# Patient Record
Sex: MALE | Race: BLACK OR AFRICAN AMERICAN
[De-identification: names, ages, dates, MRNs, and addresses within clinical notes are randomized per-mention and may not be internally consistent; named-entity substitution may affect disease eponyms.]

---

## 2017-05-22 ENCOUNTER — HOSPITAL ENCOUNTER (INPATIENT)
Dept: HOSPITAL 83 - ED | Age: 52
LOS: 1 days | Discharge: HOME | DRG: 637 | End: 2017-05-23
Attending: INTERNAL MEDICINE | Admitting: INTERNAL MEDICINE
Payer: COMMERCIAL

## 2017-05-22 VITALS — DIASTOLIC BLOOD PRESSURE: 71 MMHG | SYSTOLIC BLOOD PRESSURE: 119 MMHG

## 2017-05-22 VITALS — HEIGHT: 72 IN | BODY MASS INDEX: 28.9 KG/M2 | WEIGHT: 213.37 LBS

## 2017-05-22 VITALS — DIASTOLIC BLOOD PRESSURE: 56 MMHG | SYSTOLIC BLOOD PRESSURE: 121 MMHG

## 2017-05-22 VITALS — DIASTOLIC BLOOD PRESSURE: 85 MMHG

## 2017-05-22 VITALS — DIASTOLIC BLOOD PRESSURE: 107 MMHG

## 2017-05-22 VITALS — DIASTOLIC BLOOD PRESSURE: 85 MMHG | SYSTOLIC BLOOD PRESSURE: 140 MMHG

## 2017-05-22 VITALS — DIASTOLIC BLOOD PRESSURE: 103 MMHG

## 2017-05-22 VITALS — DIASTOLIC BLOOD PRESSURE: 57 MMHG | SYSTOLIC BLOOD PRESSURE: 108 MMHG

## 2017-05-22 VITALS — DIASTOLIC BLOOD PRESSURE: 93 MMHG

## 2017-05-22 DIAGNOSIS — M54.16: ICD-10-CM

## 2017-05-22 DIAGNOSIS — N17.0: ICD-10-CM

## 2017-05-22 DIAGNOSIS — M54.5: ICD-10-CM

## 2017-05-22 DIAGNOSIS — E66.3: ICD-10-CM

## 2017-05-22 DIAGNOSIS — Z82.49: ICD-10-CM

## 2017-05-22 DIAGNOSIS — E55.9: ICD-10-CM

## 2017-05-22 DIAGNOSIS — E11.00: Primary | ICD-10-CM

## 2017-05-22 LAB
25(OH)D3 SERPL-MCNC: 7.5 NG/ML (ref 30–100)
ALBUMIN SERPL-MCNC: 3.6 GM/DL (ref 3.1–4.5)
ALBUMIN SERPL-MCNC: 4.1 GM/DL (ref 3.1–4.5)
ALBUMIN SERPL-MCNC: NEGATIVE G/DL
ALP SERPL-CCNC: 135 U/L (ref 45–117)
ALP SERPL-CCNC: 191 U/L (ref 45–117)
ALT SERPL W P-5'-P-CCNC: 49 U/L (ref 12–78)
ALT SERPL W P-5'-P-CCNC: 54 U/L (ref 12–78)
AMPHETAMINES UR QL SCN: < 1000
APPEARANCE UR: CLEAR
AST SERPL-CCNC: 24 IU/L (ref 3–35)
AST SERPL-CCNC: 30 IU/L (ref 3–35)
BARBITURATES UR QL SCN: < 200
BASOPHILS # BLD AUTO: 0 10*3/UL (ref 0–0.1)
BASOPHILS # BLD AUTO: 0 10*3/UL (ref 0–0.1)
BASOPHILS NFR BLD AUTO: 0.4 % (ref 0–1)
BASOPHILS NFR BLD AUTO: 0.5 % (ref 0–1)
BILIRUB UR QL STRIP: NEGATIVE
BUN SERPL-MCNC: 15 MG/DL (ref 7–24)
BUN SERPL-MCNC: 18 MG/DL (ref 7–24)
BZE UR QL SCN: < 300
CHLORIDE SERPL-SCNC: 102 MMOL/L (ref 98–107)
CHLORIDE SERPL-SCNC: 92 MMOL/L (ref 98–107)
CHOLEST SERPL-MCNC: 167 MG/DL (ref ?–200)
CO2 SERPL-SCNC: 23 MMOL/L (ref 21–32)
CO2 SERPL-SCNC: 25 MMOL/L (ref 21–32)
COLOR UR: YELLOW
EOSINOPHIL # BLD AUTO: 0.1 10*3/UL (ref 0–0.4)
EOSINOPHIL # BLD AUTO: 0.1 10*3/UL (ref 0–0.4)
EOSINOPHIL # BLD AUTO: 1.4 % (ref 1–4)
EOSINOPHIL # BLD AUTO: 2 % (ref 1–4)
ERYTHROCYTE [DISTWIDTH] IN BLOOD BY AUTOMATED COUNT: 12.4 % (ref 0–14.5)
ERYTHROCYTE [DISTWIDTH] IN BLOOD BY AUTOMATED COUNT: 12.5 % (ref 0–14.5)
EST. AVERAGE GLUCOSE BLD GHB EST-MCNC: 243 MG/DL
FOLATE SERPL-MCNC: 18.64 NG/ML (ref 5.38–?)
GLUCOSE SERPL-MCNC: 362 MG/DL (ref 65–99)
GLUCOSE SERPL-MCNC: 724 MG/DL (ref 65–99)
GLUCOSE UR QL: (no result)
HCT VFR BLD AUTO: 41.2 % (ref 42–52)
HCT VFR BLD AUTO: 46.8 % (ref 42–52)
HDLC SERPL-MCNC: 29 MG/DL (ref 40–60)
HGB BLD-MCNC: 14.7 G/DL (ref 14–18)
HGB BLD-MCNC: 16.5 G/DL (ref 14–18)
HGB UR QL STRIP: NEGATIVE
IG #: 0 10*3/UL (ref 0–0.1)
IG #: 0 10*3/UL (ref 0–0.1)
KETONES UR QL STRIP: NEGATIVE
LEUKOCYTE ESTERASE UR QL STRIP: NEGATIVE
LYMPHOCYTES # BLD AUTO: 1.9 10*3/UL (ref 1.3–4.4)
LYMPHOCYTES # BLD AUTO: 2.4 10*3/UL (ref 1.3–4.4)
LYMPHOCYTES NFR BLD AUTO: 38.4 % (ref 27–41)
LYMPHOCYTES NFR BLD AUTO: 44.1 % (ref 27–41)
MAGNESIUM SERPL-MCNC: 1.9 MG/DL (ref 1.5–2.1)
MAGNESIUM SERPL-MCNC: 2 MG/DL (ref 1.5–2.1)
MCH RBC QN AUTO: 29.8 PG (ref 27–31)
MCH RBC QN AUTO: 30.3 PG (ref 27–31)
MCHC RBC AUTO-ENTMCNC: 35.3 G/DL (ref 33–37)
MCHC RBC AUTO-ENTMCNC: 35.7 G/DL (ref 33–37)
MCV RBC AUTO: 84.5 FL (ref 80–94)
MCV RBC AUTO: 84.9 FL (ref 80–94)
MONOCYTES # BLD AUTO: 0.4 10*3/UL (ref 0.1–1)
MONOCYTES # BLD AUTO: 0.4 10*3/UL (ref 0.1–1)
MONOCYTES NFR BLD MANUAL: 8 % (ref 3–9)
MONOCYTES NFR BLD MANUAL: 8.8 % (ref 3–9)
NEUT #: 2.5 10*3/UL (ref 2.3–7.9)
NEUT #: 2.5 10*3/UL (ref 2.3–7.9)
NEUT %: 45.8 % (ref 47–73)
NEUT %: 50.2 % (ref 47–73)
NITRITE UR QL STRIP: NEGATIVE
NRBC BLD QL AUTO: 0 % (ref 0–0)
NRBC BLD QL AUTO: 0 10*3/UL (ref 0–0)
PH UR STRIP: 5 [PH] (ref 5–9)
PHOSPHATE SERPL-MCNC: 3.5 MG/DL (ref 2.5–4.9)
PLATELET # BLD AUTO: 161 10*3/UL (ref 130–400)
PLATELET # BLD AUTO: 197 10*3/UL (ref 130–400)
PMV BLD AUTO: 11.5 FL (ref 9.6–12.3)
PMV BLD AUTO: 11.6 FL (ref 9.6–12.3)
POTASSIUM SERPL-SCNC: 3.9 MMOL/L (ref 3.5–5.1)
POTASSIUM SERPL-SCNC: 4.1 MMOL/L (ref 3.5–5.1)
PROT SERPL-MCNC: 6.9 GM/DL (ref 6.4–8.2)
PROT SERPL-MCNC: 8.2 GM/DL (ref 6.4–8.2)
RBC # BLD AUTO: 4.85 10*6/UL (ref 4.5–5.9)
RBC # BLD AUTO: 5.54 10*6/UL (ref 4.5–5.9)
RBC #/AREA URNS HPF: (no result) RBC/HPF (ref 0–2)
SODIUM SERPL-SCNC: 130 MMOL/L (ref 136–145)
SODIUM SERPL-SCNC: 136 MMOL/L (ref 136–145)
SP GR UR: <= 1.005 (ref 1–1.03)
T4 FREE SERPL-MCNC: 1.13 NG/DL (ref 0.76–1.46)
TRIGL SERPL-MCNC: 765 MG/DL (ref ?–150)
TROPONIN I SERPL-MCNC: < 0.015 NG/ML (ref ?–0.04)
TSH SERPL DL<=0.005 MIU/L-ACNC: 1.4 UIU/ML (ref 0.36–4.75)
TSH SERPL DL<=0.005 MIU/L-ACNC: 2.95 UIU/ML (ref 0.36–4.75)
URINE REFLEX COMMENT: NO
UROBILINOGEN UR STRIP-MCNC: 0.2 E.U./DL (ref 0.2–1)
VITAMIN B12: 1031 PG/ML (ref 247–911)
WBC #/AREA URNS HPF: (no result) WBC/HPF (ref 0–5)
WBC NRBC COR # BLD AUTO: 5 10*3/UL (ref 4.8–10.8)
WBC NRBC COR # BLD AUTO: 5.5 10*3/UL (ref 4.8–10.8)

## 2017-05-23 VITALS — SYSTOLIC BLOOD PRESSURE: 149 MMHG | DIASTOLIC BLOOD PRESSURE: 80 MMHG

## 2017-05-23 VITALS — DIASTOLIC BLOOD PRESSURE: 74 MMHG

## 2017-05-23 VITALS — DIASTOLIC BLOOD PRESSURE: 80 MMHG

## 2017-05-23 VITALS — DIASTOLIC BLOOD PRESSURE: 85 MMHG

## 2017-05-23 LAB
ALBUMIN SERPL-MCNC: NEGATIVE G/DL
APPEARANCE UR: CLEAR
BACTERIA #/AREA URNS HPF: (no result) /[HPF]
BILIRUB UR QL STRIP: NEGATIVE
BUN SERPL-MCNC: 9 MG/DL (ref 7–24)
CHLORIDE SERPL-SCNC: 105 MMOL/L (ref 98–107)
CO2 SERPL-SCNC: 24 MMOL/L (ref 21–32)
COLOR UR: YELLOW
EPI CELLS #/AREA URNS HPF: (no result) /[HPF]
GLUCOSE SERPL-MCNC: 265 MG/DL (ref 65–99)
GLUCOSE UR QL: (no result)
HGB UR QL STRIP: NEGATIVE
KETONES UR QL STRIP: NEGATIVE
LEUKOCYTE ESTERASE UR QL STRIP: NEGATIVE
NITRITE UR QL STRIP: NEGATIVE
PH UR STRIP: 5.5 [PH] (ref 5–9)
POTASSIUM SERPL-SCNC: 3.9 MMOL/L (ref 3.5–5.1)
SODIUM SERPL-SCNC: 138 MMOL/L (ref 136–145)
SP GR UR: 1.01 (ref 1–1.03)
UROBILINOGEN UR STRIP-MCNC: 0.2 E.U./DL (ref 0.2–1)

## 2018-03-03 ENCOUNTER — HOSPITAL ENCOUNTER (EMERGENCY)
Dept: HOSPITAL 83 - ED | Age: 53
Discharge: HOME | End: 2018-03-03
Payer: COMMERCIAL

## 2018-03-03 VITALS — HEIGHT: 60 IN

## 2018-03-03 DIAGNOSIS — R73.9: Primary | ICD-10-CM

## 2018-03-03 DIAGNOSIS — Z79.899: ICD-10-CM

## 2018-03-03 DIAGNOSIS — Z79.4: ICD-10-CM

## 2018-03-03 LAB
ALBUMIN SERPL-MCNC: 4.3 GM/DL (ref 3.1–4.5)
ALP SERPL-CCNC: 143 U/L (ref 45–117)
ALT SERPL W P-5'-P-CCNC: 43 U/L (ref 12–78)
AST SERPL-CCNC: 16 IU/L (ref 3–35)
BASOPHILS # BLD AUTO: 0 10*3/UL (ref 0–0.1)
BASOPHILS NFR BLD AUTO: 0.8 % (ref 0–1)
BUN SERPL-MCNC: 9 MG/DL (ref 7–24)
CHLORIDE SERPL-SCNC: 100 MMOL/L (ref 98–107)
CREAT SERPL-MCNC: 1.17 MG/DL (ref 0.7–1.3)
EOSINOPHIL # BLD AUTO: 0.1 10*3/UL (ref 0–0.4)
EOSINOPHIL # BLD AUTO: 2.5 % (ref 1–4)
ERYTHROCYTE [DISTWIDTH] IN BLOOD BY AUTOMATED COUNT: 12.7 % (ref 0–14.5)
HCT VFR BLD AUTO: 48 % (ref 42–52)
HGB BLD-MCNC: 16.2 G/DL (ref 14–18)
LIPASE SERPL-CCNC: 204 U/L (ref 73–393)
LYMPHOCYTES # BLD AUTO: 2 10*3/UL (ref 1.3–4.4)
LYMPHOCYTES NFR BLD AUTO: 38.5 % (ref 27–41)
MCH RBC QN AUTO: 29.2 PG (ref 27–31)
MCHC RBC AUTO-ENTMCNC: 33.8 G/DL (ref 33–37)
MCV RBC AUTO: 86.6 FL (ref 80–94)
MONOCYTES # BLD AUTO: 0.4 10*3/UL (ref 0.1–1)
MONOCYTES NFR BLD MANUAL: 7.6 % (ref 3–9)
NEUT #: 2.7 10*3/UL (ref 2.3–7.9)
NEUT %: 50.4 % (ref 47–73)
NRBC BLD QL AUTO: 0 % (ref 0–0)
PLATELET # BLD AUTO: 191 10*3/UL (ref 130–400)
PMV BLD AUTO: 10.7 FL (ref 9.6–12.3)
POTASSIUM SERPL-SCNC: 4.2 MMOL/L (ref 3.5–5.1)
PROT SERPL-MCNC: 8.1 GM/DL (ref 6.4–8.2)
RBC # BLD AUTO: 5.54 10*6/UL (ref 4.5–5.9)
SODIUM SERPL-SCNC: 136 MMOL/L (ref 136–145)
TROPONIN I SERPL-MCNC: < 0.015 NG/ML (ref ?–0.04)
WBC NRBC COR # BLD AUTO: 5.3 10*3/UL (ref 4.8–10.8)

## 2019-01-28 ENCOUNTER — HOSPITAL ENCOUNTER (OUTPATIENT)
Dept: HOSPITAL 83 - LAB | Age: 54
Discharge: HOME | End: 2019-01-28
Attending: INTERNAL MEDICINE
Payer: COMMERCIAL

## 2019-01-28 DIAGNOSIS — E04.9: ICD-10-CM

## 2019-01-28 DIAGNOSIS — E78.5: ICD-10-CM

## 2019-01-28 DIAGNOSIS — E11.65: Primary | ICD-10-CM

## 2019-01-28 LAB
ALBUMIN SERPL-MCNC: 3.5 GM/DL (ref 3.1–4.5)
ALP SERPL-CCNC: 123 U/L (ref 45–117)
ALT SERPL W P-5'-P-CCNC: 46 U/L (ref 12–78)
APPEARANCE UR: CLEAR
AST SERPL-CCNC: 27 IU/L (ref 3–35)
BILIRUB UR QL STRIP: NEGATIVE
BUN SERPL-MCNC: 11 MG/DL (ref 7–24)
CHLORIDE SERPL-SCNC: 106 MMOL/L (ref 98–107)
CHOLEST SERPL-MCNC: 157 MG/DL (ref ?–200)
COLOR UR: YELLOW
CREAT SERPL-MCNC: 0.96 MG/DL (ref 0.7–1.3)
EPI CELLS #/AREA URNS HPF: (no result) /[HPF]
GLUCOSE UR QL: (no result)
HDLC SERPL-MCNC: 35 MG/DL (ref 40–60)
HGB UR QL STRIP: NEGATIVE
KETONES UR QL STRIP: NEGATIVE
LDLC SERPL DIRECT ASSAY-MCNC: 54 MG/DL (ref 9–159)
LEUKOCYTE ESTERASE UR QL STRIP: NEGATIVE
NITRITE UR QL STRIP: NEGATIVE
PH UR STRIP: 6 [PH] (ref 5–9)
POTASSIUM SERPL-SCNC: 3.4 MMOL/L (ref 3.5–5.1)
PROT SERPL-MCNC: 7.2 GM/DL (ref 6.4–8.2)
SODIUM SERPL-SCNC: 142 MMOL/L (ref 136–145)
SP GR UR: 1.01 (ref 1–1.03)
T4 FREE SERPL-MCNC: 0.99 NG/DL (ref 0.76–1.46)
TRIGL SERPL-MCNC: 341 MG/DL (ref ?–150)
TSH SERPL DL<=0.005 MIU/L-ACNC: 1.76 UIU/ML (ref 0.36–4.75)
UROBILINOGEN UR STRIP-MCNC: 0.2 E.U./DL (ref 0.2–1)
VLDLC SERPL CALC-MCNC: 68 MG/DL (ref 6–40)
WBC #/AREA URNS HPF: (no result) WBC/HPF (ref 0–5)

## 2020-11-28 ENCOUNTER — APPOINTMENT (OUTPATIENT)
Dept: GENERAL RADIOLOGY | Age: 55
DRG: 137 | End: 2020-11-28
Payer: MEDICAID

## 2020-11-28 ENCOUNTER — HOSPITAL ENCOUNTER (INPATIENT)
Age: 55
LOS: 10 days | Discharge: HOME | DRG: 137 | End: 2020-12-09
Attending: EMERGENCY MEDICINE | Admitting: INTERNAL MEDICINE
Payer: MEDICAID

## 2020-11-28 LAB
ALBUMIN SERPL-MCNC: 3.7 G/DL (ref 3.5–5.2)
ALP BLD-CCNC: 89 U/L (ref 40–129)
ALT SERPL-CCNC: 12 U/L (ref 0–40)
ANION GAP SERPL CALCULATED.3IONS-SCNC: 21 MMOL/L (ref 7–16)
AST SERPL-CCNC: 18 U/L (ref 0–39)
BACTERIA: NORMAL /HPF
BASOPHILS ABSOLUTE: 0 E9/L (ref 0–0.2)
BASOPHILS RELATIVE PERCENT: 0 % (ref 0–2)
BILIRUB SERPL-MCNC: 0.6 MG/DL (ref 0–1.2)
BILIRUBIN URINE: ABNORMAL
BLOOD, URINE: ABNORMAL
BUN BLDV-MCNC: 14 MG/DL (ref 6–20)
BURR CELLS: ABNORMAL
CALCIUM SERPL-MCNC: 8.9 MG/DL (ref 8.6–10.2)
CHLORIDE BLD-SCNC: 88 MMOL/L (ref 98–107)
CLARITY: CLEAR
CO2: 19 MMOL/L (ref 22–29)
COLOR: YELLOW
CREAT SERPL-MCNC: 0.9 MG/DL (ref 0.7–1.2)
EOSINOPHILS ABSOLUTE: 0 E9/L (ref 0.05–0.5)
EOSINOPHILS RELATIVE PERCENT: 0 % (ref 0–6)
EPITHELIAL CELLS, UA: NORMAL /HPF
GFR AFRICAN AMERICAN: >60
GFR NON-AFRICAN AMERICAN: >60 ML/MIN/1.73
GLUCOSE BLD-MCNC: 324 MG/DL (ref 74–99)
GLUCOSE URINE: 500 MG/DL
HCT VFR BLD CALC: 44.6 % (ref 37–54)
HEMOGLOBIN: 15.4 G/DL (ref 12.5–16.5)
KETONES, URINE: >=80 MG/DL
LACTIC ACID, SEPSIS: 1.7 MMOL/L (ref 0.5–1.9)
LACTIC ACID, SEPSIS: 2.6 MMOL/L (ref 0.5–1.9)
LEUKOCYTE ESTERASE, URINE: NEGATIVE
LYMPHOCYTES ABSOLUTE: 0.21 E9/L (ref 1.5–4)
LYMPHOCYTES RELATIVE PERCENT: 4.3 % (ref 20–42)
MAGNESIUM: 2 MG/DL (ref 1.6–2.6)
MCH RBC QN AUTO: 29.2 PG (ref 26–35)
MCHC RBC AUTO-ENTMCNC: 34.5 % (ref 32–34.5)
MCV RBC AUTO: 84.6 FL (ref 80–99.9)
METER GLUCOSE: 293 MG/DL (ref 74–99)
METER GLUCOSE: 328 MG/DL (ref 74–99)
MONOCYTES ABSOLUTE: 0.21 E9/L (ref 0.1–0.95)
MONOCYTES RELATIVE PERCENT: 4.3 % (ref 2–12)
NEUTROPHILS ABSOLUTE: 4.82 E9/L (ref 1.8–7.3)
NEUTROPHILS RELATIVE PERCENT: 91.3 % (ref 43–80)
NITRITE, URINE: NEGATIVE
PDW BLD-RTO: 12 FL (ref 11.5–15)
PH UA: 6 (ref 5–9)
PH VENOUS: 7.33 (ref 7.35–7.45)
PLATELET # BLD: 195 E9/L (ref 130–450)
PMV BLD AUTO: 11.2 FL (ref 7–12)
POIKILOCYTES: ABNORMAL
POLYCHROMASIA: ABNORMAL
POTASSIUM SERPL-SCNC: 4.5 MMOL/L (ref 3.5–5)
PROTEIN UA: ABNORMAL MG/DL
RBC # BLD: 5.27 E12/L (ref 3.8–5.8)
RBC UA: NORMAL /HPF (ref 0–2)
SODIUM BLD-SCNC: 128 MMOL/L (ref 132–146)
SPECIFIC GRAVITY UA: >=1.03 (ref 1–1.03)
TOTAL PROTEIN: 7.5 G/DL (ref 6.4–8.3)
UROBILINOGEN, URINE: 0.2 E.U./DL
WBC # BLD: 5.3 E9/L (ref 4.5–11.5)
WBC UA: NORMAL /HPF (ref 0–5)

## 2020-11-28 PROCEDURE — 87186 SC STD MICRODIL/AGAR DIL: CPT

## 2020-11-28 PROCEDURE — 96374 THER/PROPH/DIAG INJ IV PUSH: CPT

## 2020-11-28 PROCEDURE — 71045 X-RAY EXAM CHEST 1 VIEW: CPT

## 2020-11-28 PROCEDURE — 87153 DNA/RNA SEQUENCING: CPT

## 2020-11-28 PROCEDURE — 82010 KETONE BODYS QUAN: CPT

## 2020-11-28 PROCEDURE — 80053 COMPREHEN METABOLIC PANEL: CPT

## 2020-11-28 PROCEDURE — 99285 EMERGENCY DEPT VISIT HI MDM: CPT

## 2020-11-28 PROCEDURE — 87088 URINE BACTERIA CULTURE: CPT

## 2020-11-28 PROCEDURE — 87077 CULTURE AEROBIC IDENTIFY: CPT

## 2020-11-28 PROCEDURE — 82800 BLOOD PH: CPT

## 2020-11-28 PROCEDURE — 87040 BLOOD CULTURE FOR BACTERIA: CPT

## 2020-11-28 PROCEDURE — 6370000000 HC RX 637 (ALT 250 FOR IP): Performed by: STUDENT IN AN ORGANIZED HEALTH CARE EDUCATION/TRAINING PROGRAM

## 2020-11-28 PROCEDURE — 83605 ASSAY OF LACTIC ACID: CPT

## 2020-11-28 PROCEDURE — 81001 URINALYSIS AUTO W/SCOPE: CPT

## 2020-11-28 PROCEDURE — 93005 ELECTROCARDIOGRAM TRACING: CPT | Performed by: STUDENT IN AN ORGANIZED HEALTH CARE EDUCATION/TRAINING PROGRAM

## 2020-11-28 PROCEDURE — 83735 ASSAY OF MAGNESIUM: CPT

## 2020-11-28 PROCEDURE — 85025 COMPLETE CBC W/AUTO DIFF WBC: CPT

## 2020-11-28 PROCEDURE — 0202U NFCT DS 22 TRGT SARS-COV-2: CPT

## 2020-11-28 PROCEDURE — 2580000003 HC RX 258: Performed by: STUDENT IN AN ORGANIZED HEALTH CARE EDUCATION/TRAINING PROGRAM

## 2020-11-28 PROCEDURE — 87076 CULTURE ANAEROBE IDENT EACH: CPT

## 2020-11-28 RX ORDER — 0.9 % SODIUM CHLORIDE 0.9 %
1000 INTRAVENOUS SOLUTION INTRAVENOUS ONCE
Status: COMPLETED | OUTPATIENT
Start: 2020-11-28 | End: 2020-11-28

## 2020-11-28 RX ORDER — DULAGLUTIDE 1.5 MG/.5ML
4.5 INJECTION, SOLUTION SUBCUTANEOUS WEEKLY
COMMUNITY

## 2020-11-28 RX ORDER — ACETAMINOPHEN 500 MG
1000 TABLET ORAL ONCE
Status: COMPLETED | OUTPATIENT
Start: 2020-11-28 | End: 2020-11-28

## 2020-11-28 RX ADMIN — INSULIN HUMAN 10 UNITS: 100 INJECTION, SOLUTION PARENTERAL at 23:46

## 2020-11-28 RX ADMIN — SODIUM CHLORIDE 1000 ML: 9 INJECTION, SOLUTION INTRAVENOUS at 21:16

## 2020-11-28 RX ADMIN — ACETAMINOPHEN 1000 MG: 500 TABLET, FILM COATED ORAL at 20:44

## 2020-11-28 ASSESSMENT — PAIN SCALES - GENERAL: PAINLEVEL_OUTOF10: 0

## 2020-11-29 ENCOUNTER — APPOINTMENT (OUTPATIENT)
Dept: CT IMAGING | Age: 55
DRG: 137 | End: 2020-11-29
Payer: MEDICAID

## 2020-11-29 PROBLEM — U07.1 COVID-19: Status: ACTIVE | Noted: 2020-11-29

## 2020-11-29 LAB
ADENOVIRUS BY PCR: NOT DETECTED
ANION GAP SERPL CALCULATED.3IONS-SCNC: 16 MMOL/L (ref 7–16)
BETA-HYDROXYBUTYRATE: 4.38 MMOL/L (ref 0.02–0.27)
BORDETELLA PARAPERTUSSIS BY PCR: NOT DETECTED
BORDETELLA PERTUSSIS BY PCR: NOT DETECTED
BUN BLDV-MCNC: 14 MG/DL (ref 6–20)
C-REACTIVE PROTEIN: 22.6 MG/DL (ref 0–0.4)
CALCIUM SERPL-MCNC: 8.1 MG/DL (ref 8.6–10.2)
CHLAMYDOPHILIA PNEUMONIAE BY PCR: NOT DETECTED
CHLORIDE BLD-SCNC: 97 MMOL/L (ref 98–107)
CO2: 18 MMOL/L (ref 22–29)
CORONAVIRUS 229E BY PCR: NOT DETECTED
CORONAVIRUS HKU1 BY PCR: NOT DETECTED
CORONAVIRUS NL63 BY PCR: NOT DETECTED
CORONAVIRUS OC43 BY PCR: NOT DETECTED
CREAT SERPL-MCNC: 0.8 MG/DL (ref 0.7–1.2)
D DIMER: 489 NG/ML DDU
EKG ATRIAL RATE: 99 BPM
EKG P AXIS: 44 DEGREES
EKG P-R INTERVAL: 166 MS
EKG Q-T INTERVAL: 328 MS
EKG QRS DURATION: 76 MS
EKG QTC CALCULATION (BAZETT): 420 MS
EKG R AXIS: 2 DEGREES
EKG T AXIS: 24 DEGREES
EKG VENTRICULAR RATE: 99 BPM
FERRITIN: 1191 NG/ML
FIBRINOGEN: >700 MG/DL (ref 225–540)
GFR AFRICAN AMERICAN: >60
GFR NON-AFRICAN AMERICAN: >60 ML/MIN/1.73
GLUCOSE BLD-MCNC: 266 MG/DL (ref 74–99)
HUMAN METAPNEUMOVIRUS BY PCR: NOT DETECTED
HUMAN RHINOVIRUS/ENTEROVIRUS BY PCR: NOT DETECTED
INFLUENZA A BY PCR: NOT DETECTED
INFLUENZA B BY PCR: NOT DETECTED
LACTATE DEHYDROGENASE: 316 U/L (ref 135–225)
METER GLUCOSE: 321 MG/DL (ref 74–99)
METER GLUCOSE: 346 MG/DL (ref 74–99)
METER GLUCOSE: 398 MG/DL (ref 74–99)
MYCOPLASMA PNEUMONIAE BY PCR: NOT DETECTED
PARAINFLUENZA VIRUS 1 BY PCR: NOT DETECTED
PARAINFLUENZA VIRUS 2 BY PCR: NOT DETECTED
PARAINFLUENZA VIRUS 3 BY PCR: NOT DETECTED
PARAINFLUENZA VIRUS 4 BY PCR: NOT DETECTED
POTASSIUM SERPL-SCNC: 3.9 MMOL/L (ref 3.5–5)
PROCALCITONIN: 0.16 NG/ML (ref 0–0.08)
RESPIRATORY SYNCYTIAL VIRUS BY PCR: NOT DETECTED
SARS-COV-2, PCR: DETECTED
SODIUM BLD-SCNC: 131 MMOL/L (ref 132–146)

## 2020-11-29 PROCEDURE — 6370000000 HC RX 637 (ALT 250 FOR IP): Performed by: INTERNAL MEDICINE

## 2020-11-29 PROCEDURE — 86140 C-REACTIVE PROTEIN: CPT

## 2020-11-29 PROCEDURE — 71275 CT ANGIOGRAPHY CHEST: CPT

## 2020-11-29 PROCEDURE — 2060000000 HC ICU INTERMEDIATE R&B

## 2020-11-29 PROCEDURE — 82962 GLUCOSE BLOOD TEST: CPT

## 2020-11-29 PROCEDURE — 85378 FIBRIN DEGRADE SEMIQUANT: CPT

## 2020-11-29 PROCEDURE — 82728 ASSAY OF FERRITIN: CPT

## 2020-11-29 PROCEDURE — 6360000002 HC RX W HCPCS

## 2020-11-29 PROCEDURE — 83615 LACTATE (LD) (LDH) ENZYME: CPT

## 2020-11-29 PROCEDURE — XW033E5 INTRODUCTION OF REMDESIVIR ANTI-INFECTIVE INTO PERIPHERAL VEIN, PERCUTANEOUS APPROACH, NEW TECHNOLOGY GROUP 5: ICD-10-PCS | Performed by: INTERNAL MEDICINE

## 2020-11-29 PROCEDURE — 85384 FIBRINOGEN ACTIVITY: CPT

## 2020-11-29 PROCEDURE — 84145 PROCALCITONIN (PCT): CPT

## 2020-11-29 PROCEDURE — 6360000004 HC RX CONTRAST MEDICATION: Performed by: RADIOLOGY

## 2020-11-29 PROCEDURE — 80048 BASIC METABOLIC PNL TOTAL CA: CPT

## 2020-11-29 PROCEDURE — 6360000002 HC RX W HCPCS: Performed by: INTERNAL MEDICINE

## 2020-11-29 PROCEDURE — 6360000002 HC RX W HCPCS: Performed by: STUDENT IN AN ORGANIZED HEALTH CARE EDUCATION/TRAINING PROGRAM

## 2020-11-29 PROCEDURE — 93010 ELECTROCARDIOGRAM REPORT: CPT | Performed by: INTERNAL MEDICINE

## 2020-11-29 RX ORDER — DEXAMETHASONE SODIUM PHOSPHATE 10 MG/ML
6 INJECTION INTRAMUSCULAR; INTRAVENOUS EVERY 24 HOURS
Status: DISCONTINUED | OUTPATIENT
Start: 2020-11-30 | End: 2020-11-30 | Stop reason: CLARIF

## 2020-11-29 RX ORDER — SODIUM CHLORIDE 0.9 % (FLUSH) 0.9 %
10 SYRINGE (ML) INJECTION PRN
Status: DISCONTINUED | OUTPATIENT
Start: 2020-11-29 | End: 2020-12-09 | Stop reason: HOSPADM

## 2020-11-29 RX ORDER — ALBUTEROL SULFATE 90 UG/1
2 AEROSOL, METERED RESPIRATORY (INHALATION) EVERY 6 HOURS PRN
Status: DISCONTINUED | OUTPATIENT
Start: 2020-11-29 | End: 2020-12-09 | Stop reason: HOSPADM

## 2020-11-29 RX ORDER — NICOTINE POLACRILEX 4 MG
15 LOZENGE BUCCAL PRN
Status: DISCONTINUED | OUTPATIENT
Start: 2020-11-29 | End: 2020-11-30 | Stop reason: SDUPTHER

## 2020-11-29 RX ORDER — SODIUM CHLORIDE 0.9 % (FLUSH) 0.9 %
10 SYRINGE (ML) INJECTION EVERY 12 HOURS SCHEDULED
Status: DISCONTINUED | OUTPATIENT
Start: 2020-11-29 | End: 2020-12-09 | Stop reason: HOSPADM

## 2020-11-29 RX ORDER — ACETAMINOPHEN 500 MG
500 TABLET ORAL EVERY 6 HOURS PRN
Status: DISCONTINUED | OUTPATIENT
Start: 2020-11-29 | End: 2020-11-30 | Stop reason: SDUPTHER

## 2020-11-29 RX ORDER — DEXTROSE MONOHYDRATE 25 G/50ML
12.5 INJECTION, SOLUTION INTRAVENOUS PRN
Status: DISCONTINUED | OUTPATIENT
Start: 2020-11-29 | End: 2020-11-30 | Stop reason: SDUPTHER

## 2020-11-29 RX ORDER — SODIUM CHLORIDE AND POTASSIUM CHLORIDE .9; .15 G/100ML; G/100ML
SOLUTION INTRAVENOUS CONTINUOUS
Status: DISCONTINUED | OUTPATIENT
Start: 2020-11-29 | End: 2020-12-01

## 2020-11-29 RX ORDER — SODIUM CHLORIDE AND POTASSIUM CHLORIDE .9; .15 G/100ML; G/100ML
SOLUTION INTRAVENOUS
Status: COMPLETED
Start: 2020-11-29 | End: 2020-11-29

## 2020-11-29 RX ORDER — ACETAMINOPHEN 325 MG/1
650 TABLET ORAL EVERY 4 HOURS PRN
Status: DISCONTINUED | OUTPATIENT
Start: 2020-11-29 | End: 2020-11-29 | Stop reason: SDUPTHER

## 2020-11-29 RX ORDER — DEXAMETHASONE SODIUM PHOSPHATE 10 MG/ML
6 INJECTION INTRAMUSCULAR; INTRAVENOUS ONCE
Status: COMPLETED | OUTPATIENT
Start: 2020-11-29 | End: 2020-11-29

## 2020-11-29 RX ORDER — DEXTROSE MONOHYDRATE 50 MG/ML
100 INJECTION, SOLUTION INTRAVENOUS PRN
Status: DISCONTINUED | OUTPATIENT
Start: 2020-11-29 | End: 2020-11-30 | Stop reason: SDUPTHER

## 2020-11-29 RX ORDER — BUDESONIDE AND FORMOTEROL FUMARATE DIHYDRATE 80; 4.5 UG/1; UG/1
2 AEROSOL RESPIRATORY (INHALATION) 2 TIMES DAILY
Status: DISCONTINUED | OUTPATIENT
Start: 2020-11-29 | End: 2020-12-09 | Stop reason: HOSPADM

## 2020-11-29 RX ADMIN — IOPAMIDOL 75 ML: 755 INJECTION, SOLUTION INTRAVENOUS at 03:19

## 2020-11-29 RX ADMIN — INSULIN LISPRO 8 UNITS: 100 INJECTION, SOLUTION INTRAVENOUS; SUBCUTANEOUS at 15:25

## 2020-11-29 RX ADMIN — INSULIN LISPRO 4 UNITS: 100 INJECTION, SOLUTION INTRAVENOUS; SUBCUTANEOUS at 22:36

## 2020-11-29 RX ADMIN — POTASSIUM CHLORIDE AND SODIUM CHLORIDE: 900; 150 INJECTION, SOLUTION INTRAVENOUS at 18:20

## 2020-11-29 RX ADMIN — ENOXAPARIN SODIUM 40 MG: 40 INJECTION SUBCUTANEOUS at 15:29

## 2020-11-29 RX ADMIN — DEXAMETHASONE SODIUM PHOSPHATE 6 MG: 10 INJECTION INTRAMUSCULAR; INTRAVENOUS at 03:44

## 2020-11-29 ASSESSMENT — ENCOUNTER SYMPTOMS
COUGH: 1
CONSTIPATION: 0
ABDOMINAL PAIN: 0
NAUSEA: 0
SHORTNESS OF BREATH: 1
WHEEZING: 0
DIARRHEA: 1
CHEST TIGHTNESS: 0
VOMITING: 0

## 2020-11-29 ASSESSMENT — PAIN SCALES - GENERAL: PAINLEVEL_OUTOF10: 0

## 2020-11-29 NOTE — ED PROVIDER NOTES
Patient is a 60-year-old male with past medical history significant for type 2 diabetes who is on insulin who presents the ER today with chief complaint of shortness of breath and hyperglycemia. Complains been ongoing for 4 days has been persistent in nature and moderate in severity. Patient states is becoming progressively more short of breath and that even the slightest exertion makes him extremely tired. States that he also has been experiencing anosmia for the same period of time. Also has had feels the diarrhea. He also endorses fatigue and fever. He denies any headache, dizziness, chest pain, abdominal pain, other, vomiting, constipation. Review of Systems   Constitutional: Positive for chills, fatigue and fever. HENT: Negative for drooling. Eyes: Negative for visual disturbance. Respiratory: Positive for cough and shortness of breath. Negative for chest tightness and wheezing. Cardiovascular: Negative for chest pain and palpitations. Gastrointestinal: Positive for diarrhea. Negative for abdominal pain, constipation, nausea and vomiting. Genitourinary: Negative for dysuria and frequency. Physical Exam  Constitutional:       Appearance: He is normal weight. HENT:      Head: Normocephalic and atraumatic. Right Ear: External ear normal.      Left Ear: External ear normal.      Nose: Nose normal.      Mouth/Throat:      Mouth: Mucous membranes are moist.      Pharynx: Oropharynx is clear. Eyes:      Conjunctiva/sclera: Conjunctivae normal.      Pupils: Pupils are equal, round, and reactive to light. Neck:      Musculoskeletal: Normal range of motion and neck supple. Cardiovascular:      Rate and Rhythm: Normal rate and regular rhythm. Pulses: Normal pulses. Heart sounds: Normal heart sounds. Pulmonary:      Effort: Pulmonary effort is normal.      Breath sounds: Normal breath sounds.    Abdominal:      General: Bowel sounds are normal. Palpations: Abdomen is soft. Tenderness: There is no abdominal tenderness. There is no guarding or rebound. Musculoskeletal: Normal range of motion. Skin:     General: Skin is warm and dry. Neurological:      General: No focal deficit present. Mental Status: He is oriented to person, place, and time. Psychiatric:         Mood and Affect: Mood normal.         Behavior: Behavior normal.         Thought Content: Thought content normal.         Judgment: Judgment normal.          Procedures     MDM  Number of Diagnoses or Management Options  Diagnosis management comments: Patient presented today with chief complaint of hyperglycemia, shortness of breath and fever. Patient's symptoms are concerning for COVID-19, especially anosmia. Patient was hyperglycemic in the ER and did have an elevated anion gap with a slightly low pH and a elevated beta hydroxybutyrate level. Patient was given 10 cc of insulin given that labs were very borderline for DKA. Repeat BMP does show closure of the gap. Patient was given liter of fluids as well. Patient is indeed Covid positive. Patient was ambulated and he did desaturate down to 90%, despite not very low desaturation, patient was extremely exhausted after 2 or 3 steps and had to sit down. Given the fact that he is uncontrolled right now his diabetes, his Covid positive, is subjectively very short of breath and is borderline hypoxic, I feel that he would benefit from mission for further management. I spoke with Dr. Kahlil Mahajanahan was agreeable to this plan. All questions have been answered. Amount and/or Complexity of Data Reviewed  Clinical lab tests: reviewed  Tests in the radiology section of CPT®: reviewed  Tests in the medicine section of CPT®: reviewed                  --------------------------------------------- PAST HISTORY ---------------------------------------------  Past Medical History:  has a past medical history of Diabetes mellitus ([facility].).     Past Surgical History:  has no past surgical history on file. Social History:  reports that he has never smoked. He has never used smokeless tobacco. He reports previous alcohol use. He reports that he does not use drugs. Family History: family history includes Heart Disease in his mother; Other in his father and mother. The patients home medications have been reviewed. Allergies: Patient has no known allergies.     -------------------------------------------------- RESULTS -------------------------------------------------    LABS:  Results for orders placed or performed during the hospital encounter of 11/28/20   Respiratory Panel, Molecular, with COVID-19 (Restricted: peds pts or suitable admitted adults)    Specimen: Nasopharyngeal   Result Value Ref Range    Adenovirus by PCR Not Detected Not Detected    Bordetella parapertussis by PCR Not Detected Not Detected    Bordetella pertussis by PCR Not Detected Not Detected    Chlamydophilia pneumoniae by PCR Not Detected Not Detected    Coronavirus 229E by PCR Not Detected Not Detected    Coronavirus HKU1 by PCR Not Detected Not Detected    Coronavirus NL63 by PCR Not Detected Not Detected    Coronavirus OC43 by PCR Not Detected Not Detected    SARS-CoV-2, PCR DETECTED (A) Not Detected    Human Metapneumovirus by PCR Not Detected Not Detected    Human Rhinovirus/Enterovirus by PCR Not Detected Not Detected    Influenza A by PCR Not Detected Not Detected    Influenza B by PCR Not Detected Not Detected    Mycoplasma pneumoniae by PCR Not Detected Not Detected    Parainfluenza Virus 1 by PCR Not Detected Not Detected    Parainfluenza Virus 2 by PCR Not Detected Not Detected    Parainfluenza Virus 3 by PCR Not Detected Not Detected    Parainfluenza Virus 4 by PCR Not Detected Not Detected    Respiratory Syncytial Virus by PCR Not Detected Not Detected   CBC auto differential   Result Value Ref Range    WBC 5.3 4.5 - 11.5 E9/L    RBC 5.27 3.80 - 5.80 E12/L E. U./dL    Nitrite, Urine Negative Negative    Leukocyte Esterase, Urine Negative Negative   PH, VENOUS   Result Value Ref Range    pH, Isaac 7.33 (L) 7.35 - 7.45   Beta-Hydroxybutyrate   Result Value Ref Range    Beta-Hydroxybutyrate 4.38 (H) 0.02 - 0.27 mmol/L   Microscopic Urinalysis   Result Value Ref Range    WBC, UA 0-1 0 - 5 /HPF    RBC, UA NONE 0 - 2 /HPF    Epithelial Cells, UA RARE /HPF    Bacteria, UA NONE SEEN None Seen /HPF   Basic metabolic panel   Result Value Ref Range    Sodium 131 (L) 132 - 146 mmol/L    Potassium 3.9 3.5 - 5.0 mmol/L    Chloride 97 (L) 98 - 107 mmol/L    CO2 18 (L) 22 - 29 mmol/L    Anion Gap 16 7 - 16 mmol/L    Glucose 266 (H) 74 - 99 mg/dL    BUN 14 6 - 20 mg/dL    CREATININE 0.8 0.7 - 1.2 mg/dL    GFR Non-African American >60 >=60 mL/min/1.73    GFR African American >60     Calcium 8.1 (L) 8.6 - 10.2 mg/dL   D-Dimer, Quantitative   Result Value Ref Range    D-Dimer, Quant 489 ng/mL DDU   Lactate dehydrogenase   Result Value Ref Range     (H) 135 - 225 U/L   Fibrinogen   Result Value Ref Range    Fibrinogen >700 (H) 225 - 540 mg/dL   POCT Glucose   Result Value Ref Range    Meter Glucose 293 (H) 74 - 99 mg/dL   POCT Glucose   Result Value Ref Range    Meter Glucose 328 (H) 74 - 99 mg/dL   EKG 12 lead   Result Value Ref Range    Ventricular Rate 99 BPM    Atrial Rate 99 BPM    P-R Interval 166 ms    QRS Duration 76 ms    Q-T Interval 328 ms    QTc Calculation (Bazett) 420 ms    P Axis 44 degrees    R Axis 2 degrees    T Axis 24 degrees       RADIOLOGY:  XR CHEST PORTABLE   Final Result   Bilateral lower lung multifocal mild ill-defined consolidation suggesting   pneumonia. CTA CHEST W CONTRAST    (Results Pending)       EKG: This EKG is signed and interpreted by me.     Rate: 99  Rhythm: Sinus  Interpretation: Normal sinus rhythm, no acute changes noted, left axis deviation  Comparison: no previous EKG available      ------------------------- NURSING NOTES AND VITALS REVIEWED ---------------------------  Date / Time Roomed:  11/28/2020  8:25 PM  ED Bed Assignment:  10/10    The nursing notes within the ED encounter and vital signs as below have been reviewed. Patient Vitals for the past 24 hrs:   BP Temp Temp src Pulse Resp SpO2 Height Weight   11/29/20 0318 -- 99.9 °F (37.7 °C) Oral -- -- -- -- --   11/29/20 0300 (!) 141/86 -- -- 101 -- 96 % -- --   11/29/20 0211 129/81 -- -- 85 -- -- -- --   11/29/20 0200 135/77 -- -- 91 -- 94 % -- --   11/29/20 0130 129/81 -- -- 85 -- 95 % -- --   11/29/20 0110 (!) 142/84 -- -- 98 -- -- -- --   11/29/20 0101 134/85 -- -- 86 24 94 % -- --   11/29/20 0030 134/85 -- -- 93 -- -- -- --   11/29/20 0000 127/88 -- -- 90 -- 94 % -- --   11/28/20 2348 115/72 98.3 °F (36.8 °C) Oral 87 22 94 % -- --   11/28/20 2220 121/68 99.4 °F (37.4 °C) -- 93 28 94 % -- --   11/28/20 2029 (!) 144/94 101.1 °F (38.4 °C) -- 104 20 93 % 6' 1\" (1.854 m) 210 lb (95.3 kg)       Oxygen Saturation Interpretation: Normal    ------------------------------------------ PROGRESS NOTES ------------------------------------------  Re-evaluation(s):  Time: 2300  Patients symptoms are improving  Repeat physical examination is improved    Counseling:  I have spoken with the patient and discussed todays results, in addition to providing specific details for the plan of care and counseling regarding the diagnosis and prognosis. Their questions are answered at this time and they are agreeable with the plan of admission.    --------------------------------- ADDITIONAL PROVIDER NOTES ---------------------------------  Consultations:  Time: 0330. Spoke with Dr. Emmanuel Sarabia. Discussed case. They will admit the patient. This patient's ED course included: a personal history and physicial examination    This patient has remained hemodynamically stable during their ED course. Diagnosis:  1. COVID-19    2.  Hyperglycemia        Disposition:  Patient's disposition: Admit to

## 2020-11-29 NOTE — ED NOTES
Bed: 10  Expected date:   Expected time:   Means of arrival:   Comments:  Jailene Cheung RN  11/28/20 2025

## 2020-11-29 NOTE — PROGRESS NOTES
Pharmacy Documentation     Medication: Remdesivir     Date: 11/29  Physician: Dr Brenden Moe consulted to initiate remdesivir. Patient does not currently meet [facility] Remdesivir Criteria for Use to initiate remdesivir based on not requiring supplemental oxygen with oxygen saturation levels of at least 94%. Pharmacy will continue to monitor any changes in respiratory function for the opportunity to initiate therapy.       Thank you for this consult,    Clifton Ellison, PharmD 11/29/2020 5:33 PM   [phone]

## 2020-11-29 NOTE — ED NOTES
The patient was ambulated an estimated 25 feet, he complained of being fatigued, his pulse ox was 91% while ambulating.      Kasey Melgar RN  11/29/20 7413

## 2020-11-29 NOTE — ED NOTES
Attempted to call report for room 517-1, spoke with Maxwell Mcknight. Per Susan Tam no one can take report. They are too busy, will call when able to take report.  Charge RN [facility] aware     Leslie Casanova RN  11/29/20 1809

## 2020-11-29 NOTE — H&P
 Smokeless tobacco: Never Used   Substance and Sexual Activity    Alcohol use: Not Currently    Drug use: Never    Sexual activity: Not on file   Lifestyle    Physical activity     Days per week: Not on file     Minutes per session: Not on file    Stress: Not on file   Relationships    Social connections     Talks on phone: Not on file     Gets together: Not on file     Attends Shinto service: Not on file     Active member of club or organization: Not on file     Attends meetings of clubs or organizations: Not on file     Relationship status: Not on file    Intimate partner violence     Fear of current or ex partner: Not on file     Emotionally abused: Not on file     Physically abused: Not on file     Forced sexual activity: Not on file   Other Topics Concern    Not on file   Social History Narrative    Not on file       Family History:   Family History   Problem Relation Age of Onset    Other Mother     Heart Disease Mother     Other Father        REVIEW OF SYSTEMS:    Gen: Patient denies any lightheadedness or dizziness. No LOC or syncope. + fevers or chills. HEENT: No earache, sore throat or nasal congestion. Resp: Denies cough, hemoptysis or sputum production. Cardiac: Denies chest pain,+ SOB, no diaphoresis or palpitations. GI: No nausea, vomiting, diarrhea or constipation. No melena or hematochezia. : No urinary complaints, dysuria, hematuria or frequency. MSK: No extremity weakness, paralysis or paresthesias. PHYSICAL EXAM:    Vitals:  /78   Pulse 84   Temp 99.1 °F (37.3 °C) (Oral)   Resp 16   Ht 6' 1\" (1.854 m)   Wt 210 lb (95.3 kg)   SpO2 95%   BMI 27.71 kg/m²     General:  This is a 48 y.o. yo male who is alert and oriented in NAD  HEENT:  Head is normocephalic and atraumatic, PERRLA, EOMI, mucus membranes moist with no pharyngeal erythema or exudate. Neck:  Supple with no carotid bruits, JVD or thyromegaly.   No cervical adenopathy  CV:  Regular rate and rhythm, no murmurs  Lungs: Coarse breath sounds to auscultation bilaterally with no wheezes, rales or rhonchi  Abdomen:  Soft, nontender, nondistended, bowel sounds present  Extremities:  No edema, peripheral pulses intact bilaterally  Neuro:  Cranial nerves II-XII grossly intact; motor and sensory function intact with no focal deficits  Skin:  No rashes, lesions or wounds    DATA:  CBC with Differential:    Lab Results   Component Value Date    WBC 5.3 11/28/2020    RBC 5.27 11/28/2020    HGB 15.4 11/28/2020    HCT 44.6 11/28/2020     11/28/2020    MCV 84.6 11/28/2020    MCH 29.2 11/28/2020    MCHC 34.5 11/28/2020    RDW 12.0 11/28/2020    LYMPHOPCT 4.3 11/28/2020    MONOPCT 4.3 11/28/2020    BASOPCT 0.0 11/28/2020    MONOSABS 0.21 11/28/2020    LYMPHSABS 0.21 11/28/2020    EOSABS 0.00 11/28/2020    BASOSABS 0.00 11/28/2020     CMP:    Lab Results   Component Value Date     11/29/2020    K 3.9 11/29/2020    CL 97 11/29/2020    CO2 18 11/29/2020    BUN 14 11/29/2020    CREATININE 0.8 11/29/2020    GFRAA >60 11/29/2020    LABGLOM >60 11/29/2020    GLUCOSE 266 11/29/2020    PROT 7.5 11/28/2020    LABALBU 3.7 11/28/2020    CALCIUM 8.1 11/29/2020    BILITOT 0.6 11/28/2020    ALKPHOS 89 11/28/2020    AST 18 11/28/2020    ALT 12 11/28/2020     Magnesium:    Lab Results   Component Value Date    MG 2.0 11/28/2020     Phosphorus:  No results found for: PHOS  PT/INR:  No results found for: PROTIME, INR  Troponin:  No results found for: TROPONINI  U/A:    Lab Results   Component Value Date    COLORU Yellow 11/28/2020    PROTEINU TRACE 11/28/2020    PHUR 6.0 11/28/2020    WBCUA 0-1 11/28/2020    RBCUA NONE 11/28/2020    BACTERIA NONE SEEN 11/28/2020    CLARITYU Clear 11/28/2020    SPECGRAV >=1.030 11/28/2020    LEUKOCYTESUR Negative 11/28/2020    UROBILINOGEN 0.2 11/28/2020    BILIRUBINUR MODERATE 11/28/2020    BLOODU TRACE 11/28/2020    GLUCOSEU 500 11/28/2020     ABG:  No results found for: PH, PCO2, PO2, HCO3, BE, THGB, TCO2, O2SAT  HgBA1c:  No results found for: LABA1C  FLP:  No results found for: TRIG, HDL, LDLCALC, LDLDIRECT, LABVLDL  TSH:  No results found for: TSH  IRON:  No results found for: IRON  LIPASE:  No results found for: LIPASE    ASSESSMENT AND PLAN:      Patient Active Problem List    Diagnosis Date Noted    COVID-19 pneumonia 11/29/2020    Diabetes mellitus -insulin-dependent type 2      Plan:  Home medications reviewed  Admit to monitored bed  Monitor heart rate, blood pressure, O2 saturation  Symbicort inhaler 80/4.52 puffs twice daily  Proventil inhaler 2 puffs every 4 hours as needed  Diabetic diet  Glucose scans 4 times daily with sliding scale insulin  Decadron 6 mg IV push daily  Vitamin C 1 g p.o. daily  Zinc 50 mg p.o. daily  Vitamin B1 100 mg p.o. daily  Remdesivir 200 mg IV piggyback today -consult pharmacy  D-dimer every other day  CRP every other day  Ferritin every other day      David Montanez D.O.  11/29/2020  3:13 PM

## 2020-11-30 LAB
ALBUMIN SERPL-MCNC: 3.5 G/DL (ref 3.5–5.2)
ALP BLD-CCNC: 92 U/L (ref 40–129)
ALT SERPL-CCNC: 11 U/L (ref 0–40)
ANION GAP SERPL CALCULATED.3IONS-SCNC: 19 MMOL/L (ref 7–16)
AST SERPL-CCNC: 19 U/L (ref 0–39)
BASOPHILS ABSOLUTE: 0 E9/L (ref 0–0.2)
BASOPHILS RELATIVE PERCENT: 0 % (ref 0–2)
BILIRUB SERPL-MCNC: 0.5 MG/DL (ref 0–1.2)
BUN BLDV-MCNC: 18 MG/DL (ref 6–20)
BURR CELLS: ABNORMAL
C-REACTIVE PROTEIN: 17.9 MG/DL (ref 0–0.4)
CALCIUM SERPL-MCNC: 9 MG/DL (ref 8.6–10.2)
CHLORIDE BLD-SCNC: 95 MMOL/L (ref 98–107)
CHOLESTEROL, TOTAL: 183 MG/DL (ref 0–199)
CO2: 18 MMOL/L (ref 22–29)
CREAT SERPL-MCNC: 0.9 MG/DL (ref 0.7–1.2)
D DIMER: 603 NG/ML DDU
EOSINOPHILS ABSOLUTE: 0 E9/L (ref 0.05–0.5)
EOSINOPHILS RELATIVE PERCENT: 0 % (ref 0–6)
GFR AFRICAN AMERICAN: >60
GFR NON-AFRICAN AMERICAN: >60 ML/MIN/1.73
GLUCOSE BLD-MCNC: 330 MG/DL (ref 74–99)
HBA1C MFR BLD: 10.8 % (ref 4–5.6)
HCT VFR BLD CALC: 42.5 % (ref 37–54)
HDLC SERPL-MCNC: 34 MG/DL
HEMOGLOBIN: 14.5 G/DL (ref 12.5–16.5)
LDL CHOLESTEROL CALCULATED: 118 MG/DL (ref 0–99)
LYMPHOCYTES ABSOLUTE: 0.36 E9/L (ref 1.5–4)
LYMPHOCYTES RELATIVE PERCENT: 5.2 % (ref 20–42)
MAGNESIUM: 2 MG/DL (ref 1.6–2.6)
MCH RBC QN AUTO: 29.7 PG (ref 26–35)
MCHC RBC AUTO-ENTMCNC: 34.1 % (ref 32–34.5)
MCV RBC AUTO: 87.1 FL (ref 80–99.9)
METER GLUCOSE: 340 MG/DL (ref 74–99)
METER GLUCOSE: 358 MG/DL (ref 74–99)
METER GLUCOSE: 415 MG/DL (ref 74–99)
MONOCYTES ABSOLUTE: 0.22 E9/L (ref 0.1–0.95)
MONOCYTES RELATIVE PERCENT: 2.6 % (ref 2–12)
MYELOCYTE PERCENT: 0.9 % (ref 0–0)
NEUTROPHILS ABSOLUTE: 6.62 E9/L (ref 1.8–7.3)
NEUTROPHILS RELATIVE PERCENT: 91.3 % (ref 43–80)
PDW BLD-RTO: 12.3 FL (ref 11.5–15)
PHOSPHORUS: 2.3 MG/DL (ref 2.5–4.5)
PLATELET # BLD: 245 E9/L (ref 130–450)
PMV BLD AUTO: 11.2 FL (ref 7–12)
POIKILOCYTES: ABNORMAL
POTASSIUM SERPL-SCNC: 4.5 MMOL/L (ref 3.5–5)
RBC # BLD: 4.88 E12/L (ref 3.8–5.8)
SODIUM BLD-SCNC: 132 MMOL/L (ref 132–146)
TOTAL PROTEIN: 7.3 G/DL (ref 6.4–8.3)
TRIGL SERPL-MCNC: 154 MG/DL (ref 0–149)
TSH SERPL DL<=0.05 MIU/L-ACNC: 0.8 UIU/ML (ref 0.27–4.2)
VLDLC SERPL CALC-MCNC: 31 MG/DL
WBC # BLD: 7.2 E9/L (ref 4.5–11.5)

## 2020-11-30 PROCEDURE — 2500000003 HC RX 250 WO HCPCS: Performed by: INTERNAL MEDICINE

## 2020-11-30 PROCEDURE — 85378 FIBRIN DEGRADE SEMIQUANT: CPT

## 2020-11-30 PROCEDURE — 6360000002 HC RX W HCPCS: Performed by: NURSE PRACTITIONER

## 2020-11-30 PROCEDURE — 6360000002 HC RX W HCPCS: Performed by: INTERNAL MEDICINE

## 2020-11-30 PROCEDURE — 6370000000 HC RX 637 (ALT 250 FOR IP): Performed by: INTERNAL MEDICINE

## 2020-11-30 PROCEDURE — 80053 COMPREHEN METABOLIC PANEL: CPT

## 2020-11-30 PROCEDURE — 2580000003 HC RX 258: Performed by: STUDENT IN AN ORGANIZED HEALTH CARE EDUCATION/TRAINING PROGRAM

## 2020-11-30 PROCEDURE — 86140 C-REACTIVE PROTEIN: CPT

## 2020-11-30 PROCEDURE — 87449 NOS EACH ORGANISM AG IA: CPT

## 2020-11-30 PROCEDURE — 84100 ASSAY OF PHOSPHORUS: CPT

## 2020-11-30 PROCEDURE — 82962 GLUCOSE BLOOD TEST: CPT

## 2020-11-30 PROCEDURE — 6370000000 HC RX 637 (ALT 250 FOR IP): Performed by: NURSE PRACTITIONER

## 2020-11-30 PROCEDURE — 83036 HEMOGLOBIN GLYCOSYLATED A1C: CPT

## 2020-11-30 PROCEDURE — 94664 DEMO&/EVAL PT USE INHALER: CPT

## 2020-11-30 PROCEDURE — 84443 ASSAY THYROID STIM HORMONE: CPT

## 2020-11-30 PROCEDURE — 2580000003 HC RX 258: Performed by: INTERNAL MEDICINE

## 2020-11-30 PROCEDURE — 80061 LIPID PANEL: CPT

## 2020-11-30 PROCEDURE — 85025 COMPLETE CBC W/AUTO DIFF WBC: CPT

## 2020-11-30 PROCEDURE — 36415 COLL VENOUS BLD VENIPUNCTURE: CPT

## 2020-11-30 PROCEDURE — 2060000000 HC ICU INTERMEDIATE R&B

## 2020-11-30 PROCEDURE — 83735 ASSAY OF MAGNESIUM: CPT

## 2020-11-30 PROCEDURE — 2700000000 HC OXYGEN THERAPY PER DAY

## 2020-11-30 RX ORDER — LANOLIN ALCOHOL/MO/W.PET/CERES
500 CREAM (GRAM) TOPICAL NIGHTLY
Status: DISCONTINUED | OUTPATIENT
Start: 2020-11-30 | End: 2020-12-09 | Stop reason: HOSPADM

## 2020-11-30 RX ORDER — NICOTINE POLACRILEX 4 MG
15 LOZENGE BUCCAL PRN
Status: DISCONTINUED | OUTPATIENT
Start: 2020-11-30 | End: 2020-12-09 | Stop reason: HOSPADM

## 2020-11-30 RX ORDER — ASCORBIC ACID 500 MG
1000 TABLET ORAL DAILY
Status: DISCONTINUED | OUTPATIENT
Start: 2020-11-30 | End: 2020-12-09 | Stop reason: HOSPADM

## 2020-11-30 RX ORDER — 0.9 % SODIUM CHLORIDE 0.9 %
30 INTRAVENOUS SOLUTION INTRAVENOUS PRN
Status: DISCONTINUED | OUTPATIENT
Start: 2020-11-30 | End: 2020-12-09 | Stop reason: HOSPADM

## 2020-11-30 RX ORDER — ZINC SULFATE 50(220)MG
50 CAPSULE ORAL DAILY
Status: DISCONTINUED | OUTPATIENT
Start: 2020-11-30 | End: 2020-12-09 | Stop reason: HOSPADM

## 2020-11-30 RX ORDER — ALBUTEROL SULFATE 90 UG/1
2 AEROSOL, METERED RESPIRATORY (INHALATION) EVERY 6 HOURS PRN
Status: DISCONTINUED | OUTPATIENT
Start: 2020-11-30 | End: 2020-12-05

## 2020-11-30 RX ORDER — VITAMIN E 268 MG
400 CAPSULE ORAL DAILY
Status: DISCONTINUED | OUTPATIENT
Start: 2020-11-30 | End: 2020-12-09 | Stop reason: HOSPADM

## 2020-11-30 RX ORDER — DEXTROSE MONOHYDRATE 25 G/50ML
12.5 INJECTION, SOLUTION INTRAVENOUS PRN
Status: DISCONTINUED | OUTPATIENT
Start: 2020-11-30 | End: 2020-12-09 | Stop reason: HOSPADM

## 2020-11-30 RX ORDER — THIAMINE MONONITRATE (VIT B1) 100 MG
100 TABLET ORAL DAILY
Status: DISCONTINUED | OUTPATIENT
Start: 2020-11-30 | End: 2020-12-09 | Stop reason: HOSPADM

## 2020-11-30 RX ORDER — ACETAMINOPHEN 325 MG/1
650 TABLET ORAL EVERY 6 HOURS PRN
Status: DISCONTINUED | OUTPATIENT
Start: 2020-11-30 | End: 2020-12-09 | Stop reason: HOSPADM

## 2020-11-30 RX ORDER — GUAIFENESIN/DEXTROMETHORPHAN 100-10MG/5
5 SYRUP ORAL EVERY 4 HOURS PRN
Status: DISCONTINUED | OUTPATIENT
Start: 2020-11-30 | End: 2020-12-09 | Stop reason: HOSPADM

## 2020-11-30 RX ORDER — ACETAMINOPHEN 650 MG/1
650 SUPPOSITORY RECTAL EVERY 6 HOURS PRN
Status: DISCONTINUED | OUTPATIENT
Start: 2020-11-30 | End: 2020-12-09 | Stop reason: HOSPADM

## 2020-11-30 RX ORDER — DEXTROSE MONOHYDRATE 50 MG/ML
100 INJECTION, SOLUTION INTRAVENOUS PRN
Status: DISCONTINUED | OUTPATIENT
Start: 2020-11-30 | End: 2020-12-09 | Stop reason: HOSPADM

## 2020-11-30 RX ORDER — NIACIN 500 MG/1
500 TABLET, EXTENDED RELEASE ORAL NIGHTLY
Status: DISCONTINUED | OUTPATIENT
Start: 2020-11-30 | End: 2020-11-30 | Stop reason: CLARIF

## 2020-11-30 RX ORDER — VITAMIN B COMPLEX
2000 TABLET ORAL DAILY
Status: DISCONTINUED | OUTPATIENT
Start: 2020-11-30 | End: 2020-12-04

## 2020-11-30 RX ORDER — DEXAMETHASONE SODIUM PHOSPHATE 10 MG/ML
6 INJECTION, SOLUTION INTRAMUSCULAR; INTRAVENOUS EVERY 24 HOURS
Status: DISCONTINUED | OUTPATIENT
Start: 2020-12-01 | End: 2020-12-07

## 2020-11-30 RX ADMIN — Medication 100 MG: at 16:57

## 2020-11-30 RX ADMIN — INSULIN LISPRO 8 UNITS: 100 INJECTION, SOLUTION INTRAVENOUS; SUBCUTANEOUS at 09:55

## 2020-11-30 RX ADMIN — Medication 400 UNITS: at 16:57

## 2020-11-30 RX ADMIN — POTASSIUM CHLORIDE AND SODIUM CHLORIDE: 900; 150 INJECTION, SOLUTION INTRAVENOUS at 05:38

## 2020-11-30 RX ADMIN — INSULIN LISPRO 8 UNITS: 100 INJECTION, SOLUTION INTRAVENOUS; SUBCUTANEOUS at 13:01

## 2020-11-30 RX ADMIN — Medication 500 MG: at 21:43

## 2020-11-30 RX ADMIN — ZINC SULFATE 220 MG (50 MG) CAPSULE 50 MG: CAPSULE at 16:56

## 2020-11-30 RX ADMIN — OXYCODONE HYDROCHLORIDE AND ACETAMINOPHEN 1000 MG: 500 TABLET ORAL at 16:56

## 2020-11-30 RX ADMIN — ENOXAPARIN SODIUM 40 MG: 40 INJECTION SUBCUTANEOUS at 09:56

## 2020-11-30 RX ADMIN — BUDESONIDE AND FORMOTEROL FUMARATE DIHYDRATE 2 PUFF: 80; 4.5 AEROSOL RESPIRATORY (INHALATION) at 16:24

## 2020-11-30 RX ADMIN — DEXAMETHASONE SODIUM PHOSPHATE 6 MG: 10 INJECTION, SOLUTION INTRAMUSCULAR; INTRAVENOUS at 07:17

## 2020-11-30 RX ADMIN — INSULIN LISPRO 10 UNITS: 100 INJECTION, SOLUTION INTRAVENOUS; SUBCUTANEOUS at 17:05

## 2020-11-30 RX ADMIN — Medication 10 ML: at 21:44

## 2020-11-30 RX ADMIN — REMDESIVIR 200 MG: 100 INJECTION, POWDER, LYOPHILIZED, FOR SOLUTION INTRAVENOUS at 16:57

## 2020-11-30 RX ADMIN — ENOXAPARIN SODIUM 30 MG: 30 INJECTION SUBCUTANEOUS at 21:43

## 2020-11-30 RX ADMIN — Medication 2000 UNITS: at 16:56

## 2020-11-30 RX ADMIN — INSULIN LISPRO 6 UNITS: 100 INJECTION, SOLUTION INTRAVENOUS; SUBCUTANEOUS at 21:45

## 2020-11-30 ASSESSMENT — PAIN SCALES - GENERAL
PAINLEVEL_OUTOF10: 0
PAINLEVEL_OUTOF10: 0

## 2020-11-30 NOTE — PROGRESS NOTES
anxious or depressed. Musculoskeletal:    Denies  myalgias, joint complaints or back pain. Integumentary:   Denies any rashes, ulcers, or excoriations. Denies bruising. Hematologic/Lymphatic:  Denies bruising or bleeding. Physical Exam:  No intake/output data recorded. Intake/Output Summary (Last 24 hours) at 11/30/2020 1348  Last data filed at 11/29/2020 2142  Gross per 24 hour   Intake 240 ml   Output 0 ml   Net 240 ml   I/O last 3 completed shifts: In: 240 [P.O.:240]  Out: 0   Patient Vitals for the past 96 hrs (Last 3 readings):   Weight   11/28/20 2029 210 lb (95.3 kg)     Vital Signs:   Blood pressure 118/78, pulse 108, temperature 98.8 °F (37.1 °C), temperature source Oral, resp. rate 20, height 6' 1\" (1.854 m), weight 210 lb (95.3 kg), SpO2 92 %. General appearance:  Alert, responsive, oriented to person, place, and time. No more than mildly ill-appearing, no distress. Head:  Normocephalic. No masses, lesions or tenderness. Eyes:  PERRLA. EOMI. Sclera clear. Buccal mucosa moist.  ENT:  Ears normal. Mucosa normal.  Neck:    Supple. Trachea midline. No thyromegaly. No JVD. No bruits. Heart:    Rhythm regular. Rate controlled. Lungs:    Symmetrical. Clear to auscultation bilaterally. No wheezes. No rhonchi. No rales. Abdomen:   Soft. Non-tender. Non-distended. Bowel sounds positive. No organomegaly or masses. No pain on palpation. Extremities:    Peripheral pulses present. No peripheral edema. No ulcers. No cyanosis. No clubbing. Neurologic:    Alert x 3. No focal deficit. Cranial nerves grossly intact. No focal weakness. Psych:   Behavior is normal. Mood appears normal. Speech is not rapid and/or pressured. Musculoskeletal:   Spine ROM normal. Muscular strength intact. Gait not assessed. Integumentary:  No rashes  Skin normal color and texture.   Genitalia/Breast:  Deferred    Medication:  Scheduled Meds:   niacin  500 mg Oral Nightly    vitamin B-1  100 mg Oral Daily    vitamin C  1,000 mg Oral Daily    vitamin D  2,000 Units Oral Daily    vitamin E  400 Units Oral Daily    zinc gluconate  50 mg Oral Daily    enoxaparin  30 mg Subcutaneous BID    sodium chloride flush  10 mL Intravenous 2 times per day    Dulaglutide  4.5 mg Subcutaneous Weekly    insulin lispro  0-12 Units Subcutaneous TID WC    insulin lispro  0-6 Units Subcutaneous Nightly    budesonide-formoterol  2 puff Inhalation BID    dexamethasone  6 mg Intravenous Q24H     Continuous Infusions:   dextrose      dextrose      0.9% NaCl with KCl 20 mEq 75 mL/hr at 11/30/20 0538       Objective Data:  CBC with Differential:    Lab Results   Component Value Date    WBC 7.2 11/30/2020    RBC 4.88 11/30/2020    HGB 14.5 11/30/2020    HCT 42.5 11/30/2020     11/30/2020    MCV 87.1 11/30/2020    MCH 29.7 11/30/2020    MCHC 34.1 11/30/2020    RDW 12.3 11/30/2020    LYMPHOPCT 5.2 11/30/2020    MONOPCT 2.6 11/30/2020    MYELOPCT 0.9 11/30/2020    BASOPCT 0.0 11/30/2020    MONOSABS 0.22 11/30/2020    LYMPHSABS 0.36 11/30/2020    EOSABS 0.00 11/30/2020    BASOSABS 0.00 11/30/2020     BMP:    Lab Results   Component Value Date     11/30/2020    K 4.5 11/30/2020    CL 95 11/30/2020    CO2 18 11/30/2020    BUN 18 11/30/2020    LABALBU 3.5 11/30/2020    CREATININE 0.9 11/30/2020    CALCIUM 9.0 11/30/2020    GFRAA >60 11/30/2020    LABGLOM >60 11/30/2020    GLUCOSE 330 11/30/2020       Wound Documentation:        Assessment:  · Acute respiratory failure with hypoxia secondary to COVID-19 infection  · Poorly controlled diabetes mellitus type 2 without long-term use of insulin, hemoglobin A1c 10.8%    Plan:   Brayan was admitted due to COVID-19 infection. He developed very mild hypoxia last evening and was placed on oxygen. Presently 1-2 LPM Nasal cannula oxygen is required.   Discussed with the RN attempting room air again today as he appears to be resting comfortably and is having extensive telephone conversation with absolutely no respiratory symptoms whatsoever. We have discussed the need to increase activity, sit up in chair as often as tolerated and prone as often as tolerated but at least every 2 hours. Incentive spirometer and flutter valve will be employed. Maximum COVID protocol is being undertaken and inflammatory biomarkers are being monitored. Pharmacy has been consulted for remdesivir dosing as he is now hypoxic. Hemoglobin A1c shows very poor glycemic control on his Trulicity and he would likely require further medication adjustments before discharge. Laboratory values and vital signs are being monitored and addressed accordingly. We will continue to address underlying comorbidities during the hospitalization. Continue current therapy. See orders for further plan of care. More than 50% of my  time was spent at the bedside counseling/coordinating care with the patient and/or family with face to face contact. This time was spent reviewing notes and laboratory data as well as instructing and counseling the patient. Time I spent with the family or surrogate(s) is included only if the patient was incapable of providing the necessary information or participating in medical decisions. I also discussed the differential diagnosis and all of the proposed management plans with the patient and individuals accompanying the patient. Lenny Vila requires this high level of physician care and nursing on the IMC/Telemetry unit due the complexity of decision management and chance of rapid decline or death. Continued cardiac monitoring and higher level of nursing are required. I am readily available for any further decision-making and intervention.      RUSH Maya - CNP  11/30/2020  1:48 PM

## 2020-11-30 NOTE — CARE COORDINATION
11-30-Cm note: spoke to pt for transition of care needs, pt is visiting here from [facility], pt is independent, lives with his shelley Sage,pt denies any dc needs at this time, pt is asking that I call [facility] as he has a child support hearing on Wed. Pt will supply me with phone # to complete his request.  Pt on 2l nc , no home o2. Fiance will provide transport home.  Electronically signed by Kecia Blanco RN on 11/30/2020 at 1:13 PM

## 2020-11-30 NOTE — ACP (ADVANCE CARE PLANNING)
the patient desire the use of ventilator (breathing machine)?: Yes      Resuscitation  \"CPR works best to restart the heart when there is a sudden event, like a heart attack, in someone who is otherwise healthy. Unfortunately, CPR does not typically restart the heart for people who have serious health conditions or who are very sick. \"    \"In the event your heart stopped as a result of an underlying serious health condition, would you want attempts to be made to restart your heart (answer \"yes\" for attempt to resuscitate) or would you prefer a natural death (answer \"no\" for do not attempt to resuscitate)? \" yes      NOTE: If the patient has a valid advance directive AND now provides care preference(s) that are inconsistent with that prior directive, advise the patient to consider either: creating a new advance directive that complies with state-specific requirements; or, if that is not possible, orally revoking that prior directive in accordance with state-specific requirements, which must be documented in the EHR. [] Yes   [] No   Educated Patient / Ciro Pichardo regarding differences between Advance Directives and portable DNR orders.     Length of ACP Conversation in minutes:      Conversation Outcomes:  [] ACP discussion completed  [] Existing advance directive reviewed with patient; no changes to patient's previously recorded wishes  [] New Advance Directive completed  [] Portable Do Not Rescitate prepared for Provider review and signature  [] POLST/POST/MOLST/MOST prepared for Provider review and signature      Follow-up plan:    [] Schedule follow-up conversation to continue planning  [] Referred individual to Provider for additional questions/concerns   [] Advised patient/agent/surrogate to review completed ACP document and update if needed with changes in condition, patient preferences or care setting    [] This note routed to one or more involved healthcare providers

## 2020-12-01 ENCOUNTER — APPOINTMENT (OUTPATIENT)
Dept: GENERAL RADIOLOGY | Age: 55
DRG: 137 | End: 2020-12-01
Payer: MEDICAID

## 2020-12-01 LAB
ALBUMIN SERPL-MCNC: 3 G/DL (ref 3.5–5.2)
ALP BLD-CCNC: 109 U/L (ref 40–129)
ALT SERPL-CCNC: 14 U/L (ref 0–40)
ANION GAP SERPL CALCULATED.3IONS-SCNC: 15 MMOL/L (ref 7–16)
APTT: 27.1 SEC (ref 24.5–35.1)
AST SERPL-CCNC: 18 U/L (ref 0–39)
B.E.: -3.2 MMOL/L (ref -3–3)
BASOPHILS ABSOLUTE: 0 E9/L (ref 0–0.2)
BASOPHILS RELATIVE PERCENT: 0.2 % (ref 0–2)
BILIRUB SERPL-MCNC: 0.4 MG/DL (ref 0–1.2)
BUN BLDV-MCNC: 26 MG/DL (ref 6–20)
BURR CELLS: ABNORMAL
CALCIUM SERPL-MCNC: 9.2 MG/DL (ref 8.6–10.2)
CHLORIDE BLD-SCNC: 91 MMOL/L (ref 98–107)
CO2: 21 MMOL/L (ref 22–29)
COHB: 0.5 % (ref 0–1.5)
CREAT SERPL-MCNC: 0.9 MG/DL (ref 0.7–1.2)
CRITICAL: ABNORMAL
D DIMER: 447 NG/ML DDU
DATE ANALYZED: ABNORMAL
DATE OF COLLECTION: ABNORMAL
EKG ATRIAL RATE: 96 BPM
EKG P AXIS: 41 DEGREES
EKG P-R INTERVAL: 172 MS
EKG Q-T INTERVAL: 342 MS
EKG QRS DURATION: 84 MS
EKG QTC CALCULATION (BAZETT): 432 MS
EKG R AXIS: 1 DEGREES
EKG T AXIS: 16 DEGREES
EKG VENTRICULAR RATE: 96 BPM
EOSINOPHILS ABSOLUTE: 0 E9/L (ref 0.05–0.5)
EOSINOPHILS RELATIVE PERCENT: 0 % (ref 0–6)
FIBRINOGEN: >700 MG/DL (ref 225–540)
GFR AFRICAN AMERICAN: >60
GFR NON-AFRICAN AMERICAN: >60 ML/MIN/1.73
GLUCOSE BLD-MCNC: 535 MG/DL (ref 74–99)
HCO3: 20.6 MMOL/L (ref 22–26)
HCT VFR BLD CALC: 41.1 % (ref 37–54)
HEMOGLOBIN: 14.5 G/DL (ref 12.5–16.5)
HHB: 7.2 % (ref 0–5)
INR BLD: 1.1
L. PNEUMOPHILA SEROGP 1 UR AG: NORMAL
LAB: ABNORMAL
LACTATE DEHYDROGENASE: 331 U/L (ref 135–225)
LACTIC ACID: 1.7 MMOL/L (ref 0.5–2.2)
LYMPHOCYTES ABSOLUTE: 0.33 E9/L (ref 1.5–4)
LYMPHOCYTES RELATIVE PERCENT: 5.3 % (ref 20–42)
Lab: ABNORMAL
MCH RBC QN AUTO: 29.8 PG (ref 26–35)
MCHC RBC AUTO-ENTMCNC: 35.3 % (ref 32–34.5)
MCV RBC AUTO: 84.6 FL (ref 80–99.9)
METER GLUCOSE: 305 MG/DL (ref 74–99)
METER GLUCOSE: 455 MG/DL (ref 74–99)
METER GLUCOSE: 466 MG/DL (ref 74–99)
METER GLUCOSE: >500 MG/DL (ref 74–99)
METHB: 0.3 % (ref 0–1.5)
MODE: ABNORMAL
MONOCYTES ABSOLUTE: 0.13 E9/L (ref 0.1–0.95)
MONOCYTES RELATIVE PERCENT: 1.8 % (ref 2–12)
NEUTROPHILS ABSOLUTE: 6.05 E9/L (ref 1.8–7.3)
NEUTROPHILS RELATIVE PERCENT: 92.9 % (ref 43–80)
O2 CONTENT: 19.8 ML/DL
O2 SATURATION: 92.7 % (ref 92–98.5)
O2HB: 92 % (ref 94–97)
OPERATOR ID: ABNORMAL
PATIENT TEMP: 37
PCO2: 33.7 MMHG (ref 35–45)
PDW BLD-RTO: 12.3 FL (ref 11.5–15)
PH BLOOD GAS: 7.4 (ref 7.35–7.45)
PLATELET # BLD: 296 E9/L (ref 130–450)
PMV BLD AUTO: 11.1 FL (ref 7–12)
PO2: 65.9 MMHG (ref 75–100)
POIKILOCYTES: ABNORMAL
POLYCHROMASIA: ABNORMAL
POTASSIUM REFLEX MAGNESIUM: 4.6 MMOL/L (ref 3.5–5)
POTASSIUM SERPL-SCNC: 4.6 MMOL/L (ref 3.5–5)
PRO-BNP: 69 PG/ML (ref 0–125)
PROTHROMBIN TIME: 12.3 SEC (ref 9.3–12.4)
RBC # BLD: 4.86 E12/L (ref 3.8–5.8)
SODIUM BLD-SCNC: 127 MMOL/L (ref 132–146)
SOURCE, BLOOD GAS: ABNORMAL
TEAR DROP CELLS: ABNORMAL
THB: 15.3 G/DL (ref 11.5–16.5)
TIME ANALYZED: 1246
TOTAL PROTEIN: 7.1 G/DL (ref 6.4–8.3)
TROPONIN: <0.01 NG/ML (ref 0–0.03)
URINE CULTURE, ROUTINE: NORMAL
WBC # BLD: 6.5 E9/L (ref 4.5–11.5)

## 2020-12-01 PROCEDURE — 2580000003 HC RX 258: Performed by: INTERNAL MEDICINE

## 2020-12-01 PROCEDURE — 2700000000 HC OXYGEN THERAPY PER DAY

## 2020-12-01 PROCEDURE — 85730 THROMBOPLASTIN TIME PARTIAL: CPT

## 2020-12-01 PROCEDURE — 6360000002 HC RX W HCPCS: Performed by: INTERNAL MEDICINE

## 2020-12-01 PROCEDURE — 86140 C-REACTIVE PROTEIN: CPT

## 2020-12-01 PROCEDURE — 83880 ASSAY OF NATRIURETIC PEPTIDE: CPT

## 2020-12-01 PROCEDURE — 82306 VITAMIN D 25 HYDROXY: CPT

## 2020-12-01 PROCEDURE — 80053 COMPREHEN METABOLIC PANEL: CPT

## 2020-12-01 PROCEDURE — 2060000000 HC ICU INTERMEDIATE R&B

## 2020-12-01 PROCEDURE — 83605 ASSAY OF LACTIC ACID: CPT

## 2020-12-01 PROCEDURE — 85025 COMPLETE CBC W/AUTO DIFF WBC: CPT

## 2020-12-01 PROCEDURE — 36415 COLL VENOUS BLD VENIPUNCTURE: CPT

## 2020-12-01 PROCEDURE — 84484 ASSAY OF TROPONIN QUANT: CPT

## 2020-12-01 PROCEDURE — 85610 PROTHROMBIN TIME: CPT

## 2020-12-01 PROCEDURE — 71045 X-RAY EXAM CHEST 1 VIEW: CPT

## 2020-12-01 PROCEDURE — 6360000002 HC RX W HCPCS: Performed by: NURSE PRACTITIONER

## 2020-12-01 PROCEDURE — 82805 BLOOD GASES W/O2 SATURATION: CPT

## 2020-12-01 PROCEDURE — 94669 MECHANICAL CHEST WALL OSCILL: CPT

## 2020-12-01 PROCEDURE — 2500000003 HC RX 250 WO HCPCS: Performed by: INTERNAL MEDICINE

## 2020-12-01 PROCEDURE — 83615 LACTATE (LD) (LDH) ENZYME: CPT

## 2020-12-01 PROCEDURE — 2580000003 HC RX 258: Performed by: STUDENT IN AN ORGANIZED HEALTH CARE EDUCATION/TRAINING PROGRAM

## 2020-12-01 PROCEDURE — 93010 ELECTROCARDIOGRAM REPORT: CPT | Performed by: INTERNAL MEDICINE

## 2020-12-01 PROCEDURE — 84145 PROCALCITONIN (PCT): CPT

## 2020-12-01 PROCEDURE — 82728 ASSAY OF FERRITIN: CPT

## 2020-12-01 PROCEDURE — 99222 1ST HOSP IP/OBS MODERATE 55: CPT | Performed by: INTERNAL MEDICINE

## 2020-12-01 PROCEDURE — 94640 AIRWAY INHALATION TREATMENT: CPT

## 2020-12-01 PROCEDURE — 82962 GLUCOSE BLOOD TEST: CPT

## 2020-12-01 PROCEDURE — 83036 HEMOGLOBIN GLYCOSYLATED A1C: CPT

## 2020-12-01 PROCEDURE — 85384 FIBRINOGEN ACTIVITY: CPT

## 2020-12-01 PROCEDURE — 6370000000 HC RX 637 (ALT 250 FOR IP): Performed by: INTERNAL MEDICINE

## 2020-12-01 PROCEDURE — 36600 WITHDRAWAL OF ARTERIAL BLOOD: CPT

## 2020-12-01 PROCEDURE — 85378 FIBRIN DEGRADE SEMIQUANT: CPT

## 2020-12-01 PROCEDURE — 6370000000 HC RX 637 (ALT 250 FOR IP): Performed by: NURSE PRACTITIONER

## 2020-12-01 PROCEDURE — 93005 ELECTROCARDIOGRAM TRACING: CPT | Performed by: NURSE PRACTITIONER

## 2020-12-01 RX ORDER — DEXTROSE MONOHYDRATE 25 G/50ML
12.5 INJECTION, SOLUTION INTRAVENOUS PRN
Status: DISCONTINUED | OUTPATIENT
Start: 2020-12-01 | End: 2020-12-01 | Stop reason: SDUPTHER

## 2020-12-01 RX ORDER — NICOTINE POLACRILEX 4 MG
15 LOZENGE BUCCAL PRN
Status: DISCONTINUED | OUTPATIENT
Start: 2020-12-01 | End: 2020-12-01 | Stop reason: SDUPTHER

## 2020-12-01 RX ORDER — INSULIN GLARGINE 100 [IU]/ML
20 INJECTION, SOLUTION SUBCUTANEOUS NIGHTLY
Status: DISCONTINUED | OUTPATIENT
Start: 2020-12-01 | End: 2020-12-05

## 2020-12-01 RX ORDER — FUROSEMIDE 10 MG/ML
40 INJECTION INTRAMUSCULAR; INTRAVENOUS ONCE
Status: COMPLETED | OUTPATIENT
Start: 2020-12-01 | End: 2020-12-01

## 2020-12-01 RX ORDER — DEXTROSE MONOHYDRATE 50 MG/ML
100 INJECTION, SOLUTION INTRAVENOUS PRN
Status: DISCONTINUED | OUTPATIENT
Start: 2020-12-01 | End: 2020-12-01 | Stop reason: SDUPTHER

## 2020-12-01 RX ORDER — ASPIRIN 81 MG/1
324 TABLET, CHEWABLE ORAL ONCE
Status: COMPLETED | OUTPATIENT
Start: 2020-12-01 | End: 2020-12-01

## 2020-12-01 RX ORDER — ASPIRIN 81 MG/1
81 TABLET, CHEWABLE ORAL DAILY
Status: DISCONTINUED | OUTPATIENT
Start: 2020-12-02 | End: 2020-12-09 | Stop reason: HOSPADM

## 2020-12-01 RX ADMIN — INSULIN LISPRO 3 UNITS: 100 INJECTION, SOLUTION INTRAVENOUS; SUBCUTANEOUS at 17:25

## 2020-12-01 RX ADMIN — Medication 500 MG: at 20:53

## 2020-12-01 RX ADMIN — Medication 10 ML: at 09:43

## 2020-12-01 RX ADMIN — Medication 2000 UNITS: at 09:42

## 2020-12-01 RX ADMIN — OXYCODONE HYDROCHLORIDE AND ACETAMINOPHEN 1000 MG: 500 TABLET ORAL at 09:43

## 2020-12-01 RX ADMIN — INSULIN LISPRO 18 UNITS: 100 INJECTION, SOLUTION INTRAVENOUS; SUBCUTANEOUS at 16:08

## 2020-12-01 RX ADMIN — INSULIN LISPRO 9 UNITS: 100 INJECTION, SOLUTION INTRAVENOUS; SUBCUTANEOUS at 20:54

## 2020-12-01 RX ADMIN — BUDESONIDE AND FORMOTEROL FUMARATE DIHYDRATE 2 PUFF: 80; 4.5 AEROSOL RESPIRATORY (INHALATION) at 06:03

## 2020-12-01 RX ADMIN — BUDESONIDE AND FORMOTEROL FUMARATE DIHYDRATE 2 PUFF: 80; 4.5 AEROSOL RESPIRATORY (INHALATION) at 18:25

## 2020-12-01 RX ADMIN — VANCOMYCIN HYDROCHLORIDE 1500 MG: 10 INJECTION, POWDER, LYOPHILIZED, FOR SOLUTION INTRAVENOUS at 15:50

## 2020-12-01 RX ADMIN — ENOXAPARIN SODIUM 30 MG: 30 INJECTION SUBCUTANEOUS at 20:53

## 2020-12-01 RX ADMIN — FUROSEMIDE 40 MG: 10 INJECTION, SOLUTION INTRAMUSCULAR; INTRAVENOUS at 10:13

## 2020-12-01 RX ADMIN — ZINC SULFATE 220 MG (50 MG) CAPSULE 50 MG: CAPSULE at 09:43

## 2020-12-01 RX ADMIN — INSULIN LISPRO 8 UNITS: 100 INJECTION, SOLUTION INTRAVENOUS; SUBCUTANEOUS at 09:43

## 2020-12-01 RX ADMIN — Medication 400 UNITS: at 09:43

## 2020-12-01 RX ADMIN — INSULIN GLARGINE 20 UNITS: 100 INJECTION, SOLUTION SUBCUTANEOUS at 20:54

## 2020-12-01 RX ADMIN — Medication 100 MG: at 09:43

## 2020-12-01 RX ADMIN — Medication 10 ML: at 20:54

## 2020-12-01 RX ADMIN — ASPIRIN 324 MG: 81 TABLET, CHEWABLE ORAL at 11:55

## 2020-12-01 RX ADMIN — REMDESIVIR 100 MG: 100 INJECTION, POWDER, LYOPHILIZED, FOR SOLUTION INTRAVENOUS at 16:07

## 2020-12-01 RX ADMIN — DEXAMETHASONE SODIUM PHOSPHATE 6 MG: 10 INJECTION, SOLUTION INTRAMUSCULAR; INTRAVENOUS at 06:23

## 2020-12-01 RX ADMIN — ENOXAPARIN SODIUM 30 MG: 30 INJECTION SUBCUTANEOUS at 09:43

## 2020-12-01 ASSESSMENT — PAIN SCALES - GENERAL: PAINLEVEL_OUTOF10: 0

## 2020-12-01 NOTE — PROGRESS NOTES
Pharmacy Consultation Note  (Antibiotic Dosing and Monitoring)    Initial consult date:   Consulting physician:  [facility]  Drug(s): Vancomycin IV  Indication: Pneumonia    Ht Readings from Last 1 Encounters:   20 6' 1\" (1.854 m)     Wt Readings from Last 1 Encounters:   20 210 lb (95.3 kg)       Age/  Gender Actual BW IBW DW  Allergy Information   48 y.o.     male 95.3 kg 79.9 kg 86.1 kg  Patient has no known allergies. Date  WBC BUN/CR UOP Drug/Dose Time   Given Level(s)   (Time) Comments     (#1) -- -- -- Vancomycin 1,500 mg IV Q12H <1430>       (#2)            (#3)            (#4)            (#5)            (#6)            (#7)            Estimated Creatinine Clearance: 106 mL/min (based on SCr of 0.9 mg/dL). UOP over the past 24 hours:       Intake/Output Summary (Last 24 hours) at 2020 1317  Last data filed at 2020 0942  Gross per 24 hour   Intake 610 ml   Output 1750 ml   Net -1140 ml       Temp max: Temp (24hrs), Av.6 °F (37 °C), Min:98.6 °F (37 °C), Max:98.6 °F (37 °C)      Antibiotic Regimen:  Antibiotic Dose Date Initiated   -- -- --     Cultures:  available culture and sensitivity results were reviewed in EPIC  Cultures sent and are pending.   Culture Date Result    Blood cx #1  NGTD   Blood cx #2  Gram positive rods; Diphtheroid-like    Respiratory Panel  Covid-19 positive   Urine cx  In process   Legionella Antigen  Negative     Assessment:  · Consulted by  [facility] to dose/monitor vancomycin  · Goal trough level:  15-20 mcg/mL  · Pt is a 47 y/o M who has been admitted for several days due to covid-19 pneumonia  · Serum creatinine yesterday: 0.9; CrCl ~ 106 mL/min; baseline Scr ~ unknown    Plan:  · Vancomycin 1,500 mg IV Q12H  · Level prior to fourth dose  · Follow renal function  · Pharmacist will follow and monitor/adjust dosing as necessary      Thank you for the consult,    Ary Jones, PharmD 2020 1:24 PM [phone]

## 2020-12-01 NOTE — PROGRESS NOTES
Called lab again regarding troponin and random glucose that need to be drawn. They said they should be up on unit. Found  on unit and she said they were so far behind that it would be a little while before she could draw his labs.

## 2020-12-01 NOTE — PROGRESS NOTES
nausea, vomiting, diarrhea, or constipation. Denies any abdominal pain. Genitourinary:    Denies any urgency, frequency, hematuria. Voiding  without difficulty. Extremities:   Denies lower extremity swelling, edema or cyanosis. Neurology:    Denies any headache or focal neurological deficits, Denies generalized weakness or memory difficulty. Psch:   Denies being anxious or depressed. Musculoskeletal:    Denies  myalgias, joint complaints or back pain. Integumentary:   Denies any rashes, ulcers, or excoriations. Denies bruising. Hematologic/Lymphatic:  Denies bruising or bleeding. Physical Exam:  I/O this shift:  In: 10 [I.V.:10]  Out: -     Intake/Output Summary (Last 24 hours) at 12/1/2020 1045  Last data filed at 12/1/2020 0942  Gross per 24 hour   Intake 610 ml   Output 1750 ml   Net -1140 ml   I/O last 3 completed shifts: In: 840 [P.O.:840]  Out: 2425 [Urine:2425]  Patient Vitals for the past 96 hrs (Last 3 readings):   Weight   11/28/20 2029 210 lb (95.3 kg)     Vital Signs:   Blood pressure 136/80, pulse 96, temperature 98.6 °F (37 °C), temperature source Oral, resp. rate 20, height 6' 1\" (1.854 m), weight 210 lb (95.3 kg), SpO2 90 %. General appearance:  Alert, responsive, oriented to person, place, and time. More ill-appearing, no distress. Head:  Normocephalic. No masses, lesions or tenderness. Eyes:  PERRLA. EOMI. Sclera clear. Buccal mucosa moist.  ENT:  Ears normal. Mucosa normal.  Neck:    Supple. Trachea midline. No thyromegaly. No JVD. No bruits. Heart:    Rhythm regular. Rate controlled. Systolic murmur. Lungs:    Symmetrical.  Diminished bilaterally, more so than yesterday's exam.  Clear to auscultation bilaterally. No wheezes. No rhonchi. No rales. Tidal volume appreciated to be between 7113-9867 mL via incentive spirometry. Abdomen:   Soft. Non-tender. Non-distended. Bowel sounds positive. No organomegaly or masses. No pain on palpation.   Extremities:    Peripheral >60 11/30/2020    LABGLOM >60 11/30/2020    GLUCOSE 330 11/30/2020       Wound Documentation:    see documentation     Assessment:  · Acute respiratory failure with hypoxia secondary to COVID-19 infection  · Poorly controlled diabetes mellitus type 2 without long-term use of insulin, hemoglobin A1c 10.8%  · EKG changes    Plan:   Brayan was admitted due to COVID-19 infection. His condition worsened and he is more hypoxic and more dyspneic visibly. EKG changes were noted on telemetry pack and his EKG appears abnormal in comparison to his admission EKG. Troponins will be cycled. He was provided loading dose of aspirin and started on daily aspirin. Cardiology will provide consultation. Echocardiogram will be assessed as I believe there is a component of volume overload status, this will allow for cardiomyopathy evaluation in the setting of his poorly controlled diabetes as well, assess his LVEF, and assess for new regional wall motion abnormality. Fluids will be discontinued and Lasix will be dosed x1. Stat portable chest x-ray will be obtained. We will continue with aggressive respiratory supportive measures as well as his hypoxia has worsened. We have discussed the need to increase activity, sit up in chair as often as tolerated and prone as often as tolerated but at least every 2 hours. Incentive spirometer and flutter valve will be employed. Maximum COVID protocol is being undertaken and inflammatory biomarkers are being monitored. Pharmacy has been consulted for remdesivir dosing. Hemoglobin A1c shows very poor glycemic control on his Trulicity and he would likely require further medication adjustments before discharge. Waleska Wilcox His regimen is clarified with him, he insists that he is also on insulin at home and his average blood sugars are typically in the 190 mg/dL range.   We discussed that this is inconsistent with the hemoglobin A1c value and he will likely require follow-up and repeat hemoglobin A1c with his PCP before further medication adjustments can be made After hospitalization. Laboratory values and vital signs are being monitored and addressed accordingly. We will continue to address underlying comorbidities during the hospitalization. Continue current therapy. See orders for further plan of care. More than 50% of my  time was spent at the bedside counseling/coordinating care with the patient and/or family with face to face contact. This time was spent reviewing notes and laboratory data as well as instructing and counseling the patient. Time I spent with the family or surrogate(s) is included only if the patient was incapable of providing the necessary information or participating in medical decisions. I also discussed the differential diagnosis and all of the proposed management plans with the patient and individuals accompanying the patient. Tra Mass requires this high level of physician care and nursing on the IMC/Telemetry unit due the complexity of decision management and chance of rapid decline or death. Continued cardiac monitoring and higher level of nursing are required. I am readily available for any further decision-making and intervention.      RUSH Del Rosario - CNP  12/1/2020  10:45 AM

## 2020-12-01 NOTE — CONSULTS
[facility] ([facility]) Physicians        CARDIOLOGY CONSULT             PATIENT SEEN IN CONSULT FOR: Abnormal EKG    Hospital Day: 4     Dorene Gonzalez is a 48year old patient not known to [facility] Cardiology. HISTORY OF PRESENT ILLNESS:   The patient is a 48 y.o. male with history of who presented with increased shortness of breath along with hyperglycemia for few days; CT of the chest showed diffuse bilateral groundglass opacities and had positive COVID-19 antigen. Was admitted and today dyspneic and low O2 Sats, EKG done and Cardiology consulted for abnormal EKG. ROS: Review of rest of 10 systems limited, due to his COVID-19 status, patient was not seen in person to limit personal exposure and limit PPE utilization; I try calling his room phone extension # [phone], no answer.       Past Medical History:    Past Medical History        Past Medical History:   Diagnosis Date    Diabetes mellitus ([facility] [address]          Past Surgical History:    Past Surgical History   History reviewed. No pertinent surgical history.        Medications Prior to Admission:    @  Home Medications           Prior to Admission medications    Medication Sig Start Date End Date Taking?  Authorizing Provider   Dulaglutide (TRULICITY) 1.5 AN/3.1RK SOPN Inject 4.5 mg into the skin once a week Takes on friday     Yes Historical Provider, MD           Allergies:  Patient has no known allergies.     Social History:   Social History               Socioeconomic History    Marital status: Unknown       Spouse name: Not on file    Number of children: Not on file    Years of education: Not on file    Highest education level: Not on file   Occupational History    Not on file   Social Needs    Financial resource strain: Not on file    Food insecurity       Worry: Not on file       Inability: Not on file    Transportation needs       Medical: Not on file       Non-medical: Not on file   Tobacco Use    Smoking status: Never Smoker    Smokeless tobacco: Never Used   Substance and Sexual Activity    Alcohol use: Not Currently    Drug use: Never    Sexual activity: Not on file   Lifestyle    Physical activity       Days per week: Not on file       Minutes per session: Not on file    Stress: Not on file   Relationships    Social connections       Talks on phone: Not on file       Gets together: Not on file       Attends Restorationist service: Not on file       Active member of club or organization: Not on file       Attends meetings of clubs or organizations: Not on file       Relationship status: Not on file    Intimate partner violence       Fear of current or ex partner: Not on file       Emotionally abused: Not on file       Physically abused: Not on file       Forced sexual activity: Not on file   Other Topics Concern    Not on file   Social History Narrative    Not on file           Family History:   Family History             OBJECTIVE: No acute distress. See Assessment     Diagnostics:       Telemetry: Reviewed    12 lead EK2020: Sinus rhythm, normal QTc interval; No ischemia, Non-diagnostic ST elevation - Early repolarization (similar findings also noted on previous EKGs from2020 and 3/2018)           Intake/Output Summary (Last 24 hours) at 2020 1354  Last data filed at 2020 0942  Gross per 24 hour   Intake 250 ml   Output 1350 ml   Net -1100 ml       Labs:   CBC:   Recent Labs     20  0545   WBC 5.3 7.2   HGB 15.4 14.5   HCT 44.6 42.5    245     BMP:   Recent Labs     208 20  0545   * 132   K 3.9 4.5   CO2 18* 18*   BUN 14 18   CREATININE 0.8 0.9   LABGLOM >60 >60   CALCIUM 8.1* 9.0     Mag:   Recent Labs     20  0545   MG 2.0 2.0     Phos:   Recent Labs     20  0545   PHOS 2.3*     TSH:   Recent Labs     20  0545   TSH 0.795     HgA1c:     BNP: No results for input(s): BNP in the last 72 hours.   PT/INR: No results for input(s): PROTIME, INR in the last 72 hours. APTT:No results for input(s): APTT in the last 72 hours. CARDIAC ENZYMES:No results for input(s): CKTOTAL, CKMB, CKMBINDEX, TROPONINI in the last 72 hours. FASTING LIPID PANEL:  Lab Results   Component Value Date    CHOL 183 2020    HDL 34 2020    LDLCALC 118 2020    TRIG 154 2020     LIVER PROFILE:  Recent Labs     20  0545   AST 18 19   ALT 12 11   LABALBU 3.7 3.5       Current Inpatient Medications:   [START ON 2020] aspirin  81 mg Oral Daily    insulin glargine  20 Units Subcutaneous Nightly    insulin lispro  0-18 Units Subcutaneous TID WC    insulin lispro  0-9 Units Subcutaneous Nightly    insulin lispro  3 Units Subcutaneous TID WC    vancomycin  1,500 mg Intravenous Q12H    vitamin B-1  100 mg Oral Daily    vitamin C  1,000 mg Oral Daily    Vitamin D  2,000 Units Oral Daily    vitamin E  400 Units Oral Daily    zinc sulfate  50 mg Oral Daily    enoxaparin  30 mg Subcutaneous BID    niacin  500 mg Oral Nightly    dexamethasone  6 mg Intravenous Q24H    remdesivir IVPB  100 mg Intravenous Q24H    sodium chloride flush  10 mL Intravenous 2 times per day    Dulaglutide  4.5 mg Subcutaneous Weekly    budesonide-formoterol  2 puff Inhalation BID       IV Infusions (if any):   dextrose           PHYSICAL EXAM:     CONSTITUTIONAL:   /80   Pulse 96   Temp 98.6 °F (37 °C) (Oral)   Resp 20   Ht 6' 1\" (1.854 m)   Wt 210 lb (95.3 kg)   SpO2 90%   BMI 27.71 kg/m²   Pulse  Av  Min: 96  Max: 96  Systolic (85KVW), EOB:334 , Min:136 , LAB:740    Diastolic (30TCU), WBP:66, Min:80, Max:80        Due to his COVID-19 status, patient ws not seen in person to limit personal exposure and limit PPE utilization     I try calling his room phone extension # [phone], no answer.             ASSESSMENT/PLAN:    COVID-19 - Per Dr Roberts/Dr Radha Ellison    Abnormal EKG - Early repolarization (also noted on previous EKGs from 11/28/20 and 2018), no ischemia, Normal QTc interval - No chest pain, Troponin pending; 2D Echo pending; Later Out-pt stress when stable form his COVID-19    Fluid overload - -ve [id number], Given one dose of Lasix today and as needed, 2D Echo pending; Check BNP, Monitor daily wts. Diabetes - Per Dr Roberts/Dr [place]ers          BP and HR stable.       Electronically signed by Kylie Radford MD on 12/1/2020 at [address] [place] [address] Cardiology

## 2020-12-01 NOTE — PROGRESS NOTES
Called lab about troponin and random glucose that need to be drawn. They said they will be up shortly to draw them.

## 2020-12-01 NOTE — PROGRESS NOTES
Adan Parra notified that troponin and random glucose have not been drawn by lab yet that were ordered at 10am this morning. He said to go ahead and give the 3 units plus the 18 units on the sliding scale now instead of waiting for random glucose to be drawn.

## 2020-12-02 LAB
ALBUMIN SERPL-MCNC: 2.9 G/DL (ref 3.5–5.2)
ALP BLD-CCNC: 102 U/L (ref 40–129)
ALT SERPL-CCNC: 10 U/L (ref 0–40)
ANION GAP SERPL CALCULATED.3IONS-SCNC: 14 MMOL/L (ref 7–16)
APTT: 28.8 SEC (ref 24.5–35.1)
AST SERPL-CCNC: 13 U/L (ref 0–39)
BASOPHILS ABSOLUTE: 0 E9/L (ref 0–0.2)
BASOPHILS RELATIVE PERCENT: 0 % (ref 0–2)
BILIRUB SERPL-MCNC: 0.5 MG/DL (ref 0–1.2)
BUN BLDV-MCNC: 22 MG/DL (ref 6–20)
BURR CELLS: ABNORMAL
C-REACTIVE PROTEIN: 13 MG/DL (ref 0–0.4)
CALCIUM SERPL-MCNC: 9.2 MG/DL (ref 8.6–10.2)
CHLORIDE BLD-SCNC: 93 MMOL/L (ref 98–107)
CO2: 23 MMOL/L (ref 22–29)
CREAT SERPL-MCNC: 0.7 MG/DL (ref 0.7–1.2)
D DIMER: 338 NG/ML DDU
EOSINOPHILS ABSOLUTE: 0 E9/L (ref 0.05–0.5)
EOSINOPHILS RELATIVE PERCENT: 0 % (ref 0–6)
FERRITIN: 1563 NG/ML
FIBRINOGEN: >700 MG/DL (ref 225–540)
GFR AFRICAN AMERICAN: >60
GFR NON-AFRICAN AMERICAN: >60 ML/MIN/1.73
GLUCOSE BLD-MCNC: 399 MG/DL (ref 74–99)
HBA1C MFR BLD: 10.9 % (ref 4–5.6)
HCT VFR BLD CALC: 44.2 % (ref 37–54)
HEMOGLOBIN: 15.2 G/DL (ref 12.5–16.5)
INR BLD: 1.2
LV EF: 61 %
LVEF MODALITY: NORMAL
LYMPHOCYTES ABSOLUTE: 0.46 E9/L (ref 1.5–4)
LYMPHOCYTES RELATIVE PERCENT: 7 % (ref 20–42)
MCH RBC QN AUTO: 29.7 PG (ref 26–35)
MCHC RBC AUTO-ENTMCNC: 34.4 % (ref 32–34.5)
MCV RBC AUTO: 86.5 FL (ref 80–99.9)
METAMYELOCYTES RELATIVE PERCENT: 1 % (ref 0–1)
METER GLUCOSE: 339 MG/DL (ref 74–99)
METER GLUCOSE: 400 MG/DL (ref 74–99)
METER GLUCOSE: 408 MG/DL (ref 74–99)
METER GLUCOSE: 432 MG/DL (ref 74–99)
MONOCYTES ABSOLUTE: 0.53 E9/L (ref 0.1–0.95)
MONOCYTES RELATIVE PERCENT: 8 % (ref 2–12)
MYELOCYTE PERCENT: 1 % (ref 0–0)
NEUTROPHILS ABSOLUTE: 5.61 E9/L (ref 1.8–7.3)
NEUTROPHILS RELATIVE PERCENT: 83 % (ref 43–80)
NUCLEATED RED BLOOD CELLS: 1 /100 WBC
OVALOCYTES: ABNORMAL
PDW BLD-RTO: 12.2 FL (ref 11.5–15)
PLATELET # BLD: 343 E9/L (ref 130–450)
PMV BLD AUTO: 11.1 FL (ref 7–12)
POIKILOCYTES: ABNORMAL
POTASSIUM REFLEX MAGNESIUM: 3.9 MMOL/L (ref 3.5–5)
PROCALCITONIN: 0.09 NG/ML (ref 0–0.08)
PROTHROMBIN TIME: 14 SEC (ref 9.3–12.4)
RBC # BLD: 5.11 E12/L (ref 3.8–5.8)
SODIUM BLD-SCNC: 130 MMOL/L (ref 132–146)
TOTAL PROTEIN: 7.1 G/DL (ref 6.4–8.3)
TROPONIN: <0.01 NG/ML (ref 0–0.03)
VITAMIN D 25-HYDROXY: 26 NG/ML (ref 30–100)
WBC # BLD: 6.6 E9/L (ref 4.5–11.5)

## 2020-12-02 PROCEDURE — 93308 TTE F-UP OR LMTD: CPT

## 2020-12-02 PROCEDURE — 2580000003 HC RX 258: Performed by: INTERNAL MEDICINE

## 2020-12-02 PROCEDURE — 2500000003 HC RX 250 WO HCPCS: Performed by: INTERNAL MEDICINE

## 2020-12-02 PROCEDURE — 85025 COMPLETE CBC W/AUTO DIFF WBC: CPT

## 2020-12-02 PROCEDURE — 6360000002 HC RX W HCPCS: Performed by: NURSE PRACTITIONER

## 2020-12-02 PROCEDURE — 85384 FIBRINOGEN ACTIVITY: CPT

## 2020-12-02 PROCEDURE — 84484 ASSAY OF TROPONIN QUANT: CPT

## 2020-12-02 PROCEDURE — 85730 THROMBOPLASTIN TIME PARTIAL: CPT

## 2020-12-02 PROCEDURE — 6360000002 HC RX W HCPCS: Performed by: INTERNAL MEDICINE

## 2020-12-02 PROCEDURE — 2060000000 HC ICU INTERMEDIATE R&B

## 2020-12-02 PROCEDURE — 94640 AIRWAY INHALATION TREATMENT: CPT

## 2020-12-02 PROCEDURE — 2580000003 HC RX 258: Performed by: STUDENT IN AN ORGANIZED HEALTH CARE EDUCATION/TRAINING PROGRAM

## 2020-12-02 PROCEDURE — 85378 FIBRIN DEGRADE SEMIQUANT: CPT

## 2020-12-02 PROCEDURE — 6370000000 HC RX 637 (ALT 250 FOR IP): Performed by: NURSE PRACTITIONER

## 2020-12-02 PROCEDURE — 80053 COMPREHEN METABOLIC PANEL: CPT

## 2020-12-02 PROCEDURE — 94669 MECHANICAL CHEST WALL OSCILL: CPT

## 2020-12-02 PROCEDURE — 85610 PROTHROMBIN TIME: CPT

## 2020-12-02 PROCEDURE — 36415 COLL VENOUS BLD VENIPUNCTURE: CPT

## 2020-12-02 PROCEDURE — 2700000000 HC OXYGEN THERAPY PER DAY

## 2020-12-02 PROCEDURE — 82962 GLUCOSE BLOOD TEST: CPT

## 2020-12-02 RX ORDER — DOCUSATE SODIUM 100 MG/1
100 CAPSULE, LIQUID FILLED ORAL 2 TIMES DAILY
Status: DISCONTINUED | OUTPATIENT
Start: 2020-12-02 | End: 2020-12-09 | Stop reason: HOSPADM

## 2020-12-02 RX ORDER — POLYETHYLENE GLYCOL 3350 17 G/17G
17 POWDER, FOR SOLUTION ORAL DAILY
Status: DISCONTINUED | OUTPATIENT
Start: 2020-12-02 | End: 2020-12-09 | Stop reason: HOSPADM

## 2020-12-02 RX ADMIN — POLYETHYLENE GLYCOL 3350 17 G: 17 POWDER, FOR SOLUTION ORAL at 12:06

## 2020-12-02 RX ADMIN — Medication 10 ML: at 12:07

## 2020-12-02 RX ADMIN — INSULIN LISPRO 18 UNITS: 100 INJECTION, SOLUTION INTRAVENOUS; SUBCUTANEOUS at 16:58

## 2020-12-02 RX ADMIN — INSULIN LISPRO 6 UNITS: 100 INJECTION, SOLUTION INTRAVENOUS; SUBCUTANEOUS at 20:34

## 2020-12-02 RX ADMIN — VANCOMYCIN HYDROCHLORIDE 1500 MG: 10 INJECTION, POWDER, LYOPHILIZED, FOR SOLUTION INTRAVENOUS at 03:02

## 2020-12-02 RX ADMIN — BUDESONIDE AND FORMOTEROL FUMARATE DIHYDRATE 2 PUFF: 80; 4.5 AEROSOL RESPIRATORY (INHALATION) at 05:08

## 2020-12-02 RX ADMIN — BUDESONIDE AND FORMOTEROL FUMARATE DIHYDRATE 2 PUFF: 80; 4.5 AEROSOL RESPIRATORY (INHALATION) at 23:18

## 2020-12-02 RX ADMIN — Medication 2000 UNITS: at 09:29

## 2020-12-02 RX ADMIN — INSULIN LISPRO 3 UNITS: 100 INJECTION, SOLUTION INTRAVENOUS; SUBCUTANEOUS at 16:40

## 2020-12-02 RX ADMIN — INSULIN LISPRO 18 UNITS: 100 INJECTION, SOLUTION INTRAVENOUS; SUBCUTANEOUS at 12:10

## 2020-12-02 RX ADMIN — ENOXAPARIN SODIUM 30 MG: 30 INJECTION SUBCUTANEOUS at 09:28

## 2020-12-02 RX ADMIN — ASPIRIN 81 MG CHEWABLE TABLET 81 MG: 81 TABLET CHEWABLE at 09:00

## 2020-12-02 RX ADMIN — VANCOMYCIN HYDROCHLORIDE 1500 MG: 10 INJECTION, POWDER, LYOPHILIZED, FOR SOLUTION INTRAVENOUS at 14:58

## 2020-12-02 RX ADMIN — DEXAMETHASONE SODIUM PHOSPHATE 6 MG: 10 INJECTION, SOLUTION INTRAMUSCULAR; INTRAVENOUS at 06:26

## 2020-12-02 RX ADMIN — DOCUSATE SODIUM 100 MG: 100 CAPSULE, LIQUID FILLED ORAL at 12:06

## 2020-12-02 RX ADMIN — Medication 100 MG: at 09:32

## 2020-12-02 RX ADMIN — INSULIN LISPRO 3 UNITS: 100 INJECTION, SOLUTION INTRAVENOUS; SUBCUTANEOUS at 09:40

## 2020-12-02 RX ADMIN — Medication 400 UNITS: at 09:32

## 2020-12-02 RX ADMIN — Medication 10 ML: at 21:52

## 2020-12-02 RX ADMIN — REMDESIVIR 100 MG: 100 INJECTION, POWDER, LYOPHILIZED, FOR SOLUTION INTRAVENOUS at 16:54

## 2020-12-02 RX ADMIN — INSULIN LISPRO 3 UNITS: 100 INJECTION, SOLUTION INTRAVENOUS; SUBCUTANEOUS at 11:45

## 2020-12-02 RX ADMIN — OXYCODONE HYDROCHLORIDE AND ACETAMINOPHEN 1000 MG: 500 TABLET ORAL at 09:35

## 2020-12-02 RX ADMIN — ZINC SULFATE 220 MG (50 MG) CAPSULE 50 MG: CAPSULE at 09:29

## 2020-12-02 RX ADMIN — INSULIN GLARGINE 20 UNITS: 100 INJECTION, SOLUTION SUBCUTANEOUS at 20:34

## 2020-12-02 RX ADMIN — ENOXAPARIN SODIUM 30 MG: 30 INJECTION SUBCUTANEOUS at 20:31

## 2020-12-02 RX ADMIN — Medication 500 MG: at 20:31

## 2020-12-02 RX ADMIN — DOCUSATE SODIUM 100 MG: 100 CAPSULE, LIQUID FILLED ORAL at 20:31

## 2020-12-02 RX ADMIN — INSULIN LISPRO 18 UNITS: 100 INJECTION, SOLUTION INTRAVENOUS; SUBCUTANEOUS at 09:40

## 2020-12-02 NOTE — PROGRESS NOTES
Internal Medicine Progress Note    [facility]    Kathrine Quintanilla. Helene Prasad., LEN. Todd Carlton D.O., SILVERIO Morris D.O. Katrin Corrigan, MSN, APRN, NP-C  David Howard. Ron Diop, MSN, APRN-CNP     Primary Care Physician: Elmer Frias MD   Admitting Physician:  Gabrielle See DO  Admission date and time: 11/28/2020  8:25 PM    Room:  [address] [place] [address]  Admitting diagnosis: COVID-19 [U07.1]    Patient Name: Randy Orr  MRN: [id number]    Date of Service: 12/2/2020     Subjective:    Tammy Garcias is a 48 y.o. male who was seen and examined today,12/2/2020, at the bedside. Brayan regressed yesterday requiring supplemental oxygen, becoming diaphoretic. EKG was performed due to his regression which was mildly abnormal but was confirmed to be without acute findings by cardiology. Cardiac enzymes were negative. He also had significant hyperglycemia as well and multiple adjustments were made in his insulin regimen. He states he is feeling much better today. He is sitting up eating on room air. He has been up and ambulatory in the room. He admits that he did not take the measures to address the underlying atelectasis due to Covid seriously and was being \"relaxed\". I have reinforced the use of spirometry, coughing and deep breathing. He did request something to help move his bowels and he had not moved his bowels since hospitalization. No family present during my examination. Review of System:   Constitutional:   Denies fever or chills, weight loss or gain, Admits to fatigue and malaise. HEENT:   Denies ear pain, sore throat, sinus or eye problems. Cardiovascular:   Denies any chest pain, irregular heartbeats, or palpitations. Respiratory:   Admits to resolved dyspnea. Only mild exertional dyspnea. Coughing without sputum. Gastrointestinal:   Denies nausea, vomiting, diarrhea, admits to constipation. Denies any abdominal pain.   Genitourinary: Denies any urgency, frequency, hematuria. Voiding  without difficulty. Extremities:   Denies lower extremity swelling, edema or cyanosis. Neurology:    Denies any headache or focal neurological deficits, Denies generalized weakness or memory difficulty. Psch:   Denies being anxious or depressed. Musculoskeletal:    Denies  myalgias, joint complaints or back pain. Integumentary:   Denies any rashes, ulcers, or excoriations. Denies bruising. Hematologic/Lymphatic:  Denies bruising or bleeding. Physical Exam:  No intake/output data recorded. Intake/Output Summary (Last 24 hours) at 12/2/2020 1013  Last data filed at 12/1/2020 2053  Gross per 24 hour   Intake 480 ml   Output 1050 ml   Net -570 ml   I/O last 3 completed shifts: In: 65 [P.O.:840; I.V.:10]  Out: 1750 [Urine:1750]  Patient Vitals for the past 96 hrs (Last 3 readings):   Weight   11/28/20 2029 210 lb (95.3 kg)     Vital Signs:   Blood pressure 110/76, pulse 83, temperature 97.5 °F (36.4 °C), temperature source Tympanic, resp. rate 18, height 6' 1\" (1.854 m), weight 210 lb (95.3 kg), SpO2 (!) 89 %. General appearance:  Alert, responsive, oriented to person, place, and time. Less ill-appearing, no distress. Head:  Normocephalic. No masses, lesions or tenderness. Eyes:  PERRLA. EOMI. Sclera clear. Buccal mucosa moist.  ENT:  Ears normal. Mucosa normal.  Neck:    Supple. Trachea midline. No thyromegaly. No JVD. No bruits. Heart:    Rhythm regular. Rate controlled. Systolic murmur. Lungs:    Symmetrical.  Diminished bilaterally, more so than yesterday's exam.  Clear to auscultation bilaterally. No wheezes. No rhonchi. No rales. Tidal volume appreciated to be between 3809-9741 mL via incentive spirometry. Abdomen:   Soft. Non-tender. Non-distended. Bowel sounds positive. No organomegaly or masses. No pain on palpation. Extremities:    Peripheral pulses present. No peripheral edema. No ulcers. No cyanosis.  No clubbing. Neurologic:    Alert x 3. No focal deficit. Cranial nerves grossly intact. No focal weakness. Psych:   Behavior is normal. Mood appears normal. Speech is not rapid and/or pressured. Musculoskeletal:   Spine ROM normal. Muscular strength intact. Gait not assessed. Integumentary:  Diaphoretic and pale. No rashes  Skin normal color and texture.   Genitalia/Breast:  Deferred    Medication:  Scheduled Meds:   aspirin  81 mg Oral Daily    insulin glargine  20 Units Subcutaneous Nightly    insulin lispro  0-18 Units Subcutaneous TID WC    insulin lispro  0-9 Units Subcutaneous Nightly    insulin lispro  3 Units Subcutaneous TID WC    vancomycin  1,500 mg Intravenous Q12H    vitamin B-1  100 mg Oral Daily    vitamin C  1,000 mg Oral Daily    Vitamin D  2,000 Units Oral Daily    vitamin E  400 Units Oral Daily    zinc sulfate  50 mg Oral Daily    enoxaparin  30 mg Subcutaneous BID    niacin  500 mg Oral Nightly    dexamethasone  6 mg Intravenous Q24H    remdesivir IVPB  100 mg Intravenous Q24H    sodium chloride flush  10 mL Intravenous 2 times per day    Dulaglutide  4.5 mg Subcutaneous Weekly    budesonide-formoterol  2 puff Inhalation BID     Continuous Infusions:   dextrose         Objective Data:  CBC with Differential:    Lab Results   Component Value Date    WBC 6.5 12/01/2020    RBC 4.86 12/01/2020    HGB 14.5 12/01/2020    HCT 41.1 12/01/2020     12/01/2020    MCV 84.6 12/01/2020    MCH 29.8 12/01/2020    MCHC 35.3 12/01/2020    RDW 12.3 12/01/2020    LYMPHOPCT 5.3 12/01/2020    MONOPCT 1.8 12/01/2020    MYELOPCT 0.9 11/30/2020    BASOPCT 0.2 12/01/2020    MONOSABS 0.13 12/01/2020    LYMPHSABS 0.33 12/01/2020    EOSABS 0.00 12/01/2020    BASOSABS 0.00 12/01/2020     BMP:    Lab Results   Component Value Date     12/01/2020    K 4.6 12/01/2020    K 4.6 12/01/2020    CL 91 12/01/2020    CO2 21 12/01/2020    BUN 26 12/01/2020    LABALBU 3.0 12/01/2020    CREATININE 0.9 12/01/2020    CALCIUM 9.2 12/01/2020    GFRAA >60 12/01/2020    LABGLOM >60 12/01/2020    GLUCOSE 535 12/01/2020       Wound Documentation:    see documentation     Assessment:  · Acute respiratory failure with hypoxia secondary to COVID-19 infection  · Poorly controlled diabetes mellitus type 2 without long-term use of insulin, hemoglobin A1c 10.8%  · EKG changes, nonischemic    Plan:   Iraida Castrejon was admitted due to COVID-19 infection. He admits now that he was not taking measures to address the underlying atelectasis seriously. With additional reinforcement, he shows better lung expansion and he has been weaned from oxygen again. We will continue with aggressive respiratory supportive measures as well as his hypoxia has worsened. We have discussed the need to increase activity, sit up in chair as often as tolerated and prone as often as tolerated but at least every 2 hours. Incentive spirometer and flutter valve will be employed. Maximum COVID protocol is being undertaken and inflammatory biomarkers are being monitored. Pharmacy has been consulted for remdesivir dosing. Due to ongoing hyperglycemia the patient is required up titration of his sliding scale, the addition of prandial insulin and basal insulin therapy. We await labs today to assess his response. Due to some degree of volume overload he was given low-dose IV diuresis as well. Overall he is improved. He will continue to work aggressively and wean oxygen actively. We will address his underlying constipation. With continued improvement, we anticipate his discharge home tomorrow. Laboratory values and vital signs are being monitored and addressed accordingly. We will continue to address underlying comorbidities during the hospitalization. Continue current therapy. See orders for further plan of care. More than 50% of my  time was spent at the bedside counseling/coordinating care with the patient and/or family with face to face contact. This time was spent reviewing notes and laboratory data as well as instructing and counseling the patient. Time I spent with the family or surrogate(s) is included only if the patient was incapable of providing the necessary information or participating in medical decisions. I also discussed the differential diagnosis and all of the proposed management plans with the patient and individuals accompanying the patient. Cindy Sims requires this high level of physician care and nursing on the IMC/Telemetry unit due the complexity of decision management and chance of rapid decline or death. Continued cardiac monitoring and higher level of nursing are required. I am readily available for any further decision-making and intervention.      RUSH Cardenas CNP  12/2/2020  10:13 AM

## 2020-12-02 NOTE — PROGRESS NOTES
Pharmacy Consultation Note  (Antibiotic Dosing and Monitoring)    Initial consult date:   Consulting physician: Dr Agusto Woody  Drug(s): Vancomycin IV  Indication: Pneumonia    Ht Readings from Last 1 Encounters:   20 6' 1\" (1.854 m)     Wt Readings from Last 1 Encounters:   20 210 lb (95.3 kg)       Age/  Gender Actual BW IBW DW  Allergy Information   48 y.o.     male 95.3 kg 79.9 kg 86.1 kg  Patient has no known allergies. Date  WBC BUN/CR UOP Drug/Dose Time   Given Level(s)   (Time) Comments     (#1) 6.5 26/0.9 -- Vancomycin 1,500 mg IV Q12H 1550       (#2) -- -- -- Vancomycin 1,500 mg IV Q12H 0302  <1430>       (#3)      Trough @ 0200 Please hold the following dose if level is >20 mcg/mL     (#4)            (#5)            (#6)            (#7)            Estimated Creatinine Clearance: 106 mL/min (based on SCr of 0.9 mg/dL). UOP over the past 24 hours:       Intake/Output Summary (Last 24 hours) at 2020 1118  Last data filed at 2020  Gross per 24 hour   Intake 480 ml   Output 1050 ml   Net -570 ml       Temp max: Temp (24hrs), Av.2 °F (36.8 °C), Min:97.5 °F (36.4 °C), Max:98.9 °F (37.2 °C)      Antibiotic Regimen:  Antibiotic Dose Date Initiated   -- -- --     Cultures:  available culture and sensitivity results were reviewed in EPIC  Cultures sent and are pending.   Culture Date Result    Blood cx #1  NGTD   Blood cx #2  Gram positive rods; Diphtheroid-like    Respiratory Panel  Covid-19 positive   Urine cx  <10,000 CFU/mL   Mixed gram positive organisms    Legionella Antigen  Negative     Assessment:  · Consulted by Dr. Agusto Woody to dose/monitor vancomycin  · Goal trough level:  15-20 mcg/mL  · Pt is a 49 y/o M who has been admitted for several days due to covid-19 pneumonia  · Serum creatinine yesterday: 0.9; CrCl ~ 106 mL/min; baseline Scr ~ unknown    Plan:  · Vancomycin 1,500 mg IV Q12H  · Trough tomorrow @ 0200; Please hold the following dose if level is >20 mcg/mL  · Follow renal function  · Pharmacist will follow and monitor/adjust dosing as necessary      Thank you for the consult,    Mackenzie Tellez, PharmD 12/2/2020 11:18 AM   [phone]

## 2020-12-03 PROBLEM — I48.91 ATRIAL FIBRILLATION WITH RVR (HCC): Status: ACTIVE | Noted: 2020-12-03

## 2020-12-03 LAB
EKG ATRIAL RATE: 90 BPM
EKG Q-T INTERVAL: 354 MS
EKG QRS DURATION: 84 MS
EKG QTC CALCULATION (BAZETT): 454 MS
EKG R AXIS: -175 DEGREES
EKG T AXIS: 178 DEGREES
EKG VENTRICULAR RATE: 99 BPM
METER GLUCOSE: 364 MG/DL (ref 74–99)
METER GLUCOSE: 408 MG/DL (ref 74–99)
METER GLUCOSE: 427 MG/DL (ref 74–99)
METER GLUCOSE: 448 MG/DL (ref 74–99)
METER GLUCOSE: 500 MG/DL (ref 74–99)
TROPONIN: <0.01 NG/ML (ref 0–0.03)
VANCOMYCIN TROUGH: 10.3 MCG/ML (ref 5–16)

## 2020-12-03 PROCEDURE — 2580000003 HC RX 258: Performed by: INTERNAL MEDICINE

## 2020-12-03 PROCEDURE — 99291 CRITICAL CARE FIRST HOUR: CPT | Performed by: INTERNAL MEDICINE

## 2020-12-03 PROCEDURE — 6360000002 HC RX W HCPCS: Performed by: INTERNAL MEDICINE

## 2020-12-03 PROCEDURE — 84484 ASSAY OF TROPONIN QUANT: CPT

## 2020-12-03 PROCEDURE — 2500000003 HC RX 250 WO HCPCS

## 2020-12-03 PROCEDURE — 2580000003 HC RX 258: Performed by: STUDENT IN AN ORGANIZED HEALTH CARE EDUCATION/TRAINING PROGRAM

## 2020-12-03 PROCEDURE — 2500000003 HC RX 250 WO HCPCS: Performed by: INTERNAL MEDICINE

## 2020-12-03 PROCEDURE — 6360000002 HC RX W HCPCS: Performed by: NURSE PRACTITIONER

## 2020-12-03 PROCEDURE — 2060000000 HC ICU INTERMEDIATE R&B

## 2020-12-03 PROCEDURE — 93005 ELECTROCARDIOGRAM TRACING: CPT | Performed by: NURSE PRACTITIONER

## 2020-12-03 PROCEDURE — 80202 ASSAY OF VANCOMYCIN: CPT

## 2020-12-03 PROCEDURE — 6370000000 HC RX 637 (ALT 250 FOR IP): Performed by: NURSE PRACTITIONER

## 2020-12-03 PROCEDURE — 93010 ELECTROCARDIOGRAM REPORT: CPT | Performed by: INTERNAL MEDICINE

## 2020-12-03 PROCEDURE — 2580000003 HC RX 258: Performed by: NURSE PRACTITIONER

## 2020-12-03 PROCEDURE — 2500000003 HC RX 250 WO HCPCS: Performed by: NURSE PRACTITIONER

## 2020-12-03 PROCEDURE — 94640 AIRWAY INHALATION TREATMENT: CPT

## 2020-12-03 PROCEDURE — 2700000000 HC OXYGEN THERAPY PER DAY

## 2020-12-03 PROCEDURE — 6360000002 HC RX W HCPCS

## 2020-12-03 PROCEDURE — 36415 COLL VENOUS BLD VENIPUNCTURE: CPT

## 2020-12-03 PROCEDURE — 99232 SBSQ HOSP IP/OBS MODERATE 35: CPT | Performed by: INTERNAL MEDICINE

## 2020-12-03 PROCEDURE — 82962 GLUCOSE BLOOD TEST: CPT

## 2020-12-03 RX ORDER — DILTIAZEM HYDROCHLORIDE 5 MG/ML
20 INJECTION INTRAVENOUS ONCE
Status: COMPLETED | OUTPATIENT
Start: 2020-12-03 | End: 2020-12-03

## 2020-12-03 RX ORDER — 0.9 % SODIUM CHLORIDE 0.9 %
500 INTRAVENOUS SOLUTION INTRAVENOUS ONCE
Status: COMPLETED | OUTPATIENT
Start: 2020-12-03 | End: 2020-12-03

## 2020-12-03 RX ORDER — SODIUM CHLORIDE 9 MG/ML
INJECTION, SOLUTION INTRAVENOUS ONCE
Status: DISCONTINUED | OUTPATIENT
Start: 2020-12-03 | End: 2020-12-03

## 2020-12-03 RX ORDER — DILTIAZEM HYDROCHLORIDE 5 MG/ML
10 INJECTION INTRAVENOUS ONCE
Status: DISCONTINUED | OUTPATIENT
Start: 2020-12-03 | End: 2020-12-03

## 2020-12-03 RX ORDER — METOPROLOL TARTRATE 5 MG/5ML
INJECTION INTRAVENOUS
Status: COMPLETED
Start: 2020-12-03 | End: 2020-12-03

## 2020-12-03 RX ORDER — DIGOXIN 0.25 MG/ML
INJECTION INTRAMUSCULAR; INTRAVENOUS
Status: COMPLETED
Start: 2020-12-03 | End: 2020-12-03

## 2020-12-03 RX ADMIN — Medication 100 MG: at 11:00

## 2020-12-03 RX ADMIN — DOCUSATE SODIUM 100 MG: 100 CAPSULE, LIQUID FILLED ORAL at 11:01

## 2020-12-03 RX ADMIN — DILTIAZEM HYDROCHLORIDE 10 MG/HR: 5 INJECTION, SOLUTION INTRAVENOUS at 22:47

## 2020-12-03 RX ADMIN — INSULIN LISPRO 3 UNITS: 100 INJECTION, SOLUTION INTRAVENOUS; SUBCUTANEOUS at 13:12

## 2020-12-03 RX ADMIN — REMDESIVIR 100 MG: 100 INJECTION, POWDER, LYOPHILIZED, FOR SOLUTION INTRAVENOUS at 15:15

## 2020-12-03 RX ADMIN — INSULIN LISPRO 18 UNITS: 100 INJECTION, SOLUTION INTRAVENOUS; SUBCUTANEOUS at 13:13

## 2020-12-03 RX ADMIN — ZINC SULFATE 220 MG (50 MG) CAPSULE 50 MG: CAPSULE at 11:00

## 2020-12-03 RX ADMIN — DILTIAZEM HYDROCHLORIDE 5 MG/HR: 5 INJECTION, SOLUTION INTRAVENOUS at 11:57

## 2020-12-03 RX ADMIN — VANCOMYCIN HYDROCHLORIDE 1750 MG: 10 INJECTION, POWDER, LYOPHILIZED, FOR SOLUTION INTRAVENOUS at 16:34

## 2020-12-03 RX ADMIN — Medication 400 UNITS: at 11:01

## 2020-12-03 RX ADMIN — DIGOXIN 250 MCG: 0.25 INJECTION INTRAMUSCULAR; INTRAVENOUS at 10:21

## 2020-12-03 RX ADMIN — Medication 10 ML: at 23:17

## 2020-12-03 RX ADMIN — BUDESONIDE AND FORMOTEROL FUMARATE DIHYDRATE 2 PUFF: 80; 4.5 AEROSOL RESPIRATORY (INHALATION) at 06:45

## 2020-12-03 RX ADMIN — INSULIN LISPRO 18 UNITS: 100 INJECTION, SOLUTION INTRAVENOUS; SUBCUTANEOUS at 10:51

## 2020-12-03 RX ADMIN — INSULIN LISPRO 9 UNITS: 100 INJECTION, SOLUTION INTRAVENOUS; SUBCUTANEOUS at 21:18

## 2020-12-03 RX ADMIN — OXYCODONE HYDROCHLORIDE AND ACETAMINOPHEN 1000 MG: 500 TABLET ORAL at 11:00

## 2020-12-03 RX ADMIN — ASPIRIN 81 MG CHEWABLE TABLET 81 MG: 81 TABLET CHEWABLE at 11:00

## 2020-12-03 RX ADMIN — INSULIN LISPRO 18 UNITS: 100 INJECTION, SOLUTION INTRAVENOUS; SUBCUTANEOUS at 18:24

## 2020-12-03 RX ADMIN — ENOXAPARIN SODIUM 100 MG: 100 INJECTION SUBCUTANEOUS at 21:17

## 2020-12-03 RX ADMIN — ENOXAPARIN SODIUM 100 MG: 100 INJECTION SUBCUTANEOUS at 11:01

## 2020-12-03 RX ADMIN — SODIUM CHLORIDE 500 ML: 0.9 INJECTION, SOLUTION INTRAVENOUS at 10:25

## 2020-12-03 RX ADMIN — Medication 2000 UNITS: at 11:01

## 2020-12-03 RX ADMIN — INSULIN LISPRO 3 UNITS: 100 INJECTION, SOLUTION INTRAVENOUS; SUBCUTANEOUS at 10:49

## 2020-12-03 RX ADMIN — DIGOXIN 250 MCG: 250 INJECTION, SOLUTION INTRAMUSCULAR; INTRAVENOUS; PARENTERAL at 10:21

## 2020-12-03 RX ADMIN — DEXAMETHASONE SODIUM PHOSPHATE 6 MG: 10 INJECTION, SOLUTION INTRAMUSCULAR; INTRAVENOUS at 06:24

## 2020-12-03 RX ADMIN — METOPROLOL TARTRATE 5 MG: 1 INJECTION, SOLUTION INTRAVENOUS at 10:21

## 2020-12-03 RX ADMIN — INSULIN GLARGINE 20 UNITS: 100 INJECTION, SOLUTION SUBCUTANEOUS at 21:18

## 2020-12-03 RX ADMIN — DILTIAZEM HYDROCHLORIDE 20 MG: 5 INJECTION INTRAVENOUS at 10:25

## 2020-12-03 RX ADMIN — VANCOMYCIN HYDROCHLORIDE 1500 MG: 10 INJECTION, POWDER, LYOPHILIZED, FOR SOLUTION INTRAVENOUS at 03:18

## 2020-12-03 RX ADMIN — INSULIN LISPRO 3 UNITS: 100 INJECTION, SOLUTION INTRAVENOUS; SUBCUTANEOUS at 18:23

## 2020-12-03 RX ADMIN — Medication 500 MG: at 21:17

## 2020-12-03 RX ADMIN — BUDESONIDE AND FORMOTEROL FUMARATE DIHYDRATE 2 PUFF: 80; 4.5 AEROSOL RESPIRATORY (INHALATION) at 18:39

## 2020-12-03 NOTE — PROGRESS NOTES
Internal Medicine Progress Note    [facility]    Yossi Obrien. Paola Pineda., MARLENE.SOULEYMANE.[email or web address]. Jayshree Toth D.O., KARLO Alvarenga, MSN, APRN, NP-C  Reginaldo Arizmendi. Rosalee Valle, MSN, APRN-CNP     Primary Care Physician: Willian Brown MD   Admitting Physician:  Jacob Renae DO  Admission date and time: 11/28/2020  8:25 PM    Room:  [address] [place] [address]  Admitting diagnosis: COVID-19 [U07.1]    Patient Name: Cassidy Guerrero  MRN: [id number]    Date of Service: 12/3/2020     Covering for Dr. Kiesha Hawthorne:    Chintan Ortega is a 48 y.o. male who was seen and examined today,12/3/2020, at the bedside. Patient seems stable at the present time but earlier today had a rapid response called on him. His heart rhythm time did show atrial fibrillation with  rates up to 140. He states that this was precipitated because he got upset not getting his food. Currently he is being seen by cardiology and adjustment has been made his medication with better rate control. The patient states his symptoms did occur approximately 10 days ago. Pulmonary wise he still short of breath but is currently on 10 L/min of O2. He denies any chest pain          Review of System:   Constitutional:   Denies fever or chills, weight loss or gain, Admits to fatigue and malaise. HEENT:   Denies ear pain, sore throat, sinus or eye problems. Cardiovascular:   Patient with fast heart rate with atrial fibrillation earlier today  Respiratory:   Still have exertional dyspnea. Coughing without sputum. Gastrointestinal:   Denies nausea, vomiting, diarrhea, admits to constipation. Denies any abdominal pain. Genitourinary:    Denies any urgency, frequency, hematuria. Voiding  without difficulty. Extremities:   Denies lower extremity swelling, edema or cyanosis. Neurology:    Denies any headache or focal neurological deficits, Denies generalized weakness or memory difficulty.    Psch:   Denies being anxious or depressed. Musculoskeletal:    Denies  myalgias, joint complaints or back pain. Integumentary:   Denies any rashes, ulcers, or excoriations. Denies bruising. Hematologic/Lymphatic:  Denies bruising or bleeding. Physical Exam:  No intake/output data recorded. No intake or output data in the 24 hours ending 12/03/20 1210No intake/output data recorded. No data found. Vital Signs:   Blood pressure 109/66, pulse 110, temperature 97.5 °F (36.4 °C), temperature source Oral, resp. rate 23, height 6' 1\" (1.854 m), weight 210 lb (95.3 kg), SpO2 94 %. General appearance:  Alert, responsive, oriented to person, place, and time. Less ill-appearing, no distress. Head:  Normocephalic. No masses, lesions or tenderness. Eyes:  PERRLA. EOMI. Sclera clear. Buccal mucosa moist.  ENT:  Ears normal. Mucosa normal.  Neck:    Supple. Trachea midline. No thyromegaly. No JVD. No bruits. Heart:    Atrial fibrillation with better rate control. 1/6 systolic murmur. Lungs:    Symmetrical.  Diminished bilaterally, more so than yesterday's exam.  Clear to auscultation bilaterally. No wheezes. No rhonchi. No rales. Tidal volume appreciated to be between 5843-6822 mL via incentive spirometry. Abdomen:   Soft. Non-tender. Non-distended. Bowel sounds positive. No organomegaly or masses. No pain on palpation. Extremities:    Peripheral pulses present. No peripheral edema. No ulcers. No cyanosis. No clubbing. Neurologic:    Alert x 3. No focal deficit. Cranial nerves grossly intact. No focal weakness. Psych:   Behavior is normal. Mood appears normal. Speech is not rapid and/or pressured. Musculoskeletal:   Spine ROM normal. Muscular strength intact. Gait not assessed. Integumentary:  Diaphoretic and pale. No rashes  Skin normal color and texture.   Genitalia/Breast:  Deferred    Medication:  Scheduled Meds:   vancomycin  1,750 mg Intravenous Q12H    enoxaparin  1 mg/kg Subcutaneous BID    polyethylene glycol  17 g Oral Daily    docusate sodium  100 mg Oral BID    aspirin  81 mg Oral Daily    insulin glargine  20 Units Subcutaneous Nightly    insulin lispro  0-18 Units Subcutaneous TID     insulin lispro  0-9 Units Subcutaneous Nightly    insulin lispro  3 Units Subcutaneous TID     vitamin B-1  100 mg Oral Daily    vitamin C  1,000 mg Oral Daily    Vitamin D  2,000 Units Oral Daily    vitamin E  400 Units Oral Daily    zinc sulfate  50 mg Oral Daily    niacin  500 mg Oral Nightly    dexamethasone  6 mg Intravenous Q24H    remdesivir IVPB  100 mg Intravenous Q24H    sodium chloride flush  10 mL Intravenous 2 times per day    Dulaglutide  4.5 mg Subcutaneous Weekly    budesonide-formoterol  2 puff Inhalation BID     Continuous Infusions:   dilTIAZem (CARDIZEM) 125 mg in dextrose 5% 125 mL infusion 5 mg/hr (12/03/20 1157)    dextrose         Objective Data:  CBC with Differential:    Lab Results   Component Value Date    WBC 6.6 12/02/2020    RBC 5.11 12/02/2020    HGB 15.2 12/02/2020    HCT 44.2 12/02/2020     12/02/2020    MCV 86.5 12/02/2020    MCH 29.7 12/02/2020    MCHC 34.4 12/02/2020    RDW 12.2 12/02/2020    NRBC 1.0 12/02/2020    METASPCT 1.0 12/02/2020    LYMPHOPCT 7.0 12/02/2020    MONOPCT 8.0 12/02/2020    MYELOPCT 1.0 12/02/2020    BASOPCT 0.0 12/02/2020    MONOSABS 0.53 12/02/2020    LYMPHSABS 0.46 12/02/2020    EOSABS 0.00 12/02/2020    BASOSABS 0.00 12/02/2020     BMP:    Lab Results   Component Value Date     12/02/2020    K 3.9 12/02/2020    CL 93 12/02/2020    CO2 23 12/02/2020    BUN 22 12/02/2020    LABALBU 2.9 12/02/2020    CREATININE 0.7 12/02/2020    CALCIUM 9.2 12/02/2020    GFRAA >60 12/02/2020    LABGLOM >60 12/02/2020    GLUCOSE 399 12/02/2020       Wound Documentation:    see documentation     Assessment:    · Acute respiratory failure with hypoxia secondary to COVID-19 infection  · Poorly controlled diabetes mellitus type 2 without long-term use of insulin, hemoglobin A1c 10.8%  · EKG changes, nonischemic  · New onset atrial fibrillation rapid ventricular response  · Possible contaminated blood culture    Plan:     · Patient developed atrial fibrillation rapid ventricular response. Currently being seen by cardiology  · Continue to monitor pulmonary status  · Continue monitoring biomarkers. · Does appear will be completed tomorrow  · Wean oxygen therapy  · Probably will require outpatient sleep study  · Repeat blood culture possible contamination  30 minutes of critical care time was spent with the patient. This includes chart review, , and discussion with those consultants involved in the patient's care. More than 50% of my  time was spent at the bedside counseling/coordinating care with the patient and/or family with face to face contact. This time was spent reviewing notes and laboratory data as well as instructing and counseling the patient. Time I spent with the family or surrogate(s) is included only if the patient was incapable of providing the necessary information or participating in medical decisions. I also discussed the differential diagnosis and all of the proposed management plans with the patient and individuals accompanying the patient. Catana Major requires this high level of physician care and nursing on the IMC/Telemetry unit due the complexity of decision management and chance of rapid decline or death. Continued cardiac monitoring and higher level of nursing are required. I am readily available for any further decision-making and intervention.      DO SHERICE Tucker  12/3/2020  12:10 PM

## 2020-12-03 NOTE — PLAN OF CARE
Problem: Patient Education: Go to Patient Education Activity  Goal: Patient/Family Education  Outcome: Met This Shift

## 2020-12-03 NOTE — ADT AUTH CERT
Pneumonia - Care Day 2 (11/30/2020) by Liya Joshi RN         Review Status  Review Entered    Completed  12/1/2020 12:23        Criteria Review       Care Day: 2 Care Date: 11/30/2020 Level of Care:    Guideline Day 2    Clinical Status    ( ) * No CO2 retention or acidosis    ( ) * No requirement for mechanical ventilation    ( ) * Hypotension absent    12/1/2020 12:23 PM EST by Mook Dior      Vital Signs:   Blood pressure 118/78, pulse 108, temperature 98.8 °F (37.1 °C), temperature source Oral, resp. rate 20, height 6' 1\" (1.854 m), weight 210 lb (95.3 kg), SpO2 92 %. ( ) * Afebrile or fever improved    ( ) * No hypoxia on room air or oxygenation improved    ( ) * Mental status improved or at baseline    Activity    ( ) * Increased activity    Routes    (X) Oral hydration, medications    12/1/2020 12:23 PM EST by Mook Dior      SYMBICORT BID, DECADRON IV DAILY, LOVEOX BID, HUMALOG 4XDAY SS, NIACIN NIGHTLY, REMDESIVIR IV X1, THIAMINE DAILY, VIT C DAILY, VIT D DAILY, VIT E DAILY, ZINC SULFATE DAILY    * Milestone    Additional Notes    INTERNAL MED       Date of Service: 11/30/2020           Subjective:         Maricel Pierce is a 48 y.o. male who was seen and examined today,11/30/2020, at the bedside. Maricel Pierce is doing okay from a respiratory standpoint.  Did drop to 90% on room air and was placed on oxygen.  I discussed with the RN that first measures for treatment of Covid should be activity, ambulation, repositioning, incentive spirometry and flutter valve rather than supplemental oxygen if able and she voiced understanding and agreement and this was discussed with the patient as well.  He is coughing with no sputum.  Otherwise feeling okay.  No acute symptoms reported. No family present during my examination. Vital Signs:   Blood pressure 118/78, pulse 108, temperature 98.8 °F (37.1 °C), temperature source Oral, resp. rate 20, height 6' 1\" (1.854 m), weight 210 lb (95.3 kg), SpO2 92 %.       Medication:    Scheduled Meds:    Scheduled Medications    · niacin 500 mg Oral Nightly    · vitamin B-1 100 mg Oral Daily    · vitamin C 1,000 mg Oral Daily    · vitamin D 2,000 Units Oral Daily    · vitamin E 400 Units Oral Daily    · zinc gluconate 50 mg Oral Daily    · enoxaparin 30 mg Subcutaneous BID    · sodium chloride flush 10 mL Intravenous 2 times per day    · Dulaglutide 4.5 mg Subcutaneous Weekly    · insulin lispro 0-12 Units Subcutaneous TID WC    · insulin lispro 0-6 Units Subcutaneous Nightly    · budesonide-formoterol 2 puff Inhalation BID    · dexamethasone 6 mg Intravenous Q24H          Continuous Infusions:    Infusions Meds    · dextrose    · dextrose    · 0.9% NaCl with KCl 20 mEq 75 mL/hr at 11/30/20 0538               Objective Data:    CBC with Differential:      Lab Results    Component Value Date      WBC 7.2 11/30/2020      RBC 4.88 11/30/2020      HGB 14.5 11/30/2020      HCT 42.5 11/30/2020       11/30/2020      MCV 87.1 11/30/2020      MCH 29.7 11/30/2020      MCHC 34.1 11/30/2020      RDW 12.3 11/30/2020      LYMPHOPCT 5.2 11/30/2020      MONOPCT 2.6 11/30/2020      MYELOPCT 0.9 11/30/2020      BASOPCT 0.0 11/30/2020      MONOSABS 0.22 11/30/2020      LYMPHSABS 0.36 11/30/2020      EOSABS 0.00 11/30/2020      BASOSABS 0.00 11/30/2020         BMP:      Lab Results    Component Value Date       11/30/2020      K 4.5 11/30/2020      CL 95 11/30/2020      CO2 18 11/30/2020      BUN 18 11/30/2020      LABALBU 3.5 11/30/2020      CREATININE 0.9 11/30/2020      CALCIUM 9.0 11/30/2020      GFRAA >60 11/30/2020      LABGLOM >60 11/30/2020      GLUCOSE 330 11/30/2020              Wound Documentation:              Assessment:    · Acute respiratory failure with hypoxia secondary to COVID-19 infection    · Poorly controlled diabetes mellitus type 2 without long-term use of insulin, hemoglobin A1c 10.8%         Plan:    Tessy Metz was admitted due to COVID-19 infection.  He developed very mild hypoxia last evening and was placed on oxygen.  Presently 1-2 LPM Nasal cannula oxygen is required.  Discussed with the RN attempting room air again today as he appears to be resting comfortably and is having extensive telephone conversation with absolutely no respiratory symptoms whatsoever. We have discussed the need to increase activity, sit up in chair as often as tolerated and prone as often as tolerated but at least every 2 hours.  Incentive spirometer and flutter valve will be employed.  Maximum COVID protocol is being undertaken and inflammatory biomarkers are being monitored.  Pharmacy has been consulted for remdesivir dosing as he is now hypoxic.  Hemoglobin A1c shows very poor glycemic control on his Trulicity and he would likely require further medication adjustments before discharge.  Laboratory values and vital signs are being monitored and addressed accordingly.  We will continue to address underlying comorbidities during the hospitalization.  Continue current therapy.  See orders for further plan of care.         More than 50% of my  time was spent at the bedside counseling/coordinating care with the patient and/or family with face to face contact.  This time was spent reviewing notes and laboratory data as well as instructing and counseling the patient. Time I spent with the family or surrogate(s) is included only if the patient was incapable of providing the necessary information or participating in medical decisions. I also discussed the differential diagnosis and all of the proposed management plans with the patient and individuals accompanying the patient.         Brayan requires this high level of physician care and nursing on the IMC/Telemetry unit due the complexity of decision management and chance of rapid decline or death.  Continued cardiac monitoring and higher level of nursing are required.  I am readily available for any further decision-making and intervention.         Pneumonia - Care Day 1 (11/29/2020) by Garrison Alves RN         Review Status  Review Entered    Completed  12/1/2020 12:04        Criteria Review       Care Day: 1 Care Date: 11/29/2020 Level of Care:    Guideline Day 1    Clinical Status    (X) * Clinical Indications met [E]    12/1/2020 12:04 PM EST by José Miguel Lundberg      Pt increased SOB along with hyperglycemia at home; CTA chest showed diffuse bilateral groundglass opacities; positive COVID-19 antigen; ambulated in the hallway and was markedly dyspneic & increased fatigue. Routes    (X) Parenteral or oral medications    12/1/2020 12:04 PM EST by José Miguel Lundberg      DECADRON IV X1, LOVENOX BID, HUMALOG 4XDAY SS, IVF @ 75 mL/hr, ISOVUE IV IMG X1    * Milestone    Additional Notes    INTERNAL MED 11/29/20:    CHIEF COMPLAINT: Shortness of breath, hyperglycemia         Reason for Admission: Positive COVID-19 pneumonia         HISTORY OF PRESENT ILLNESS:           The patient is a 48 y.o. male who presents with increased shortness of breath along with hyperglycemia at home.  Symptoms started about 4 days prior to admission. Joanne Alvarez denied any chest pain, abdominal pain, nausea/vomiting.  Patient did have some mild intermittent diarrhea.  Patient denied any productive cough.  Patient did have some mild fever and chills.  Patient went to the ED where CTA of the chest showed diffuse bilateral groundglass opacities.  BUN/creatinine was normal with random blood sugar 328 with a beta hydroxybutyrate 4.38 WBC 5.3 and the patient had a positive COVID-19 antigen. Hilda Rodriguez was ambulated in the hallway and was markedly dyspneic and increased fatigue and with this the patient was admitted for further evaluation.        Vitals:    /78   Pulse 84   Temp 99.1 °F (37.3 °C) (Oral)   Resp 16   Ht 6' 1\" (1.854 m)   Wt 210 lb (95.3 kg)   SpO2 95%   BMI 27.71 kg/m²            DATA:    CBC with Differential:      Lab Results    Component Value Date      WBC 5.3 11/28/2020      RBC 5.27 11/28/2020      HGB 15.4 11/28/2020      HCT 44.6 11/28/2020       11/28/2020      MCV 84.6 11/28/2020      MCH 29.2 11/28/2020      MCHC 34.5 11/28/2020      RDW 12.0 11/28/2020      LYMPHOPCT 4.3 11/28/2020      MONOPCT 4.3 11/28/2020      BASOPCT 0.0 11/28/2020      MONOSABS 0.21 11/28/2020      LYMPHSABS 0.21 11/28/2020      EOSABS 0.00 11/28/2020      BASOSABS 0.00 11/28/2020         CMP:      Lab Results    Component Value Date       11/29/2020      K 3.9 11/29/2020      CL 97 11/29/2020      CO2 18 11/29/2020      BUN 14 11/29/2020      CREATININE 0.8 11/29/2020      GFRAA >60 11/29/2020      LABGLOM >60 11/29/2020      GLUCOSE 266 11/29/2020      PROT 7.5 11/28/2020      LABALBU 3.7 11/28/2020      CALCIUM 8.1 11/29/2020      BILITOT 0.6 11/28/2020      ALKPHOS 89 11/28/2020      AST 18 11/28/2020      ALT 12 11/28/2020         Magnesium:      Lab Results    Component Value Date      MG 2.0 11/28/2020         Phosphorus:  No results found for: PHOS    PT/INR:  No results found for: PROTIME, INR    Troponin:  No results found for: TROPONINI    U/A:      Lab Results    Component Value Date      COLORU Yellow 11/28/2020      PROTEINU TRACE 11/28/2020      PHUR 6.0 11/28/2020      WBCUA 0-1 11/28/2020      RBCUA NONE 11/28/2020      BACTERIA NONE SEEN 11/28/2020      CLARITYU Clear 11/28/2020      SPECGRAV >=1.030 11/28/2020      LEUKOCYTESUR Negative 11/28/2020      UROBILINOGEN 0.2 11/28/2020      BILIRUBINUR MODERATE 11/28/2020      BLOODU TRACE 11/28/2020      GLUCOSEU 500 11/28/2020         ABG:  No results found for: PH, PCO2, PO2, HCO3, BE, THGB, TCO2, O2SAT    HgBA1c:  No results found for: LABA1C    FLP:  No results found for: TRIG, HDL, LDLCALC, LDLDIRECT, LABVLDL    TSH:  No results found for: TSH    IRON:  No results found for: IRON    LIPASE:  No results found for: LIPASE         ASSESSMENT AND PLAN:           Patient Active Problem List      Diagnosis Date

## 2020-12-03 NOTE — PROGRESS NOTES
[facility]) Physicians        CARDIOLOGY                 INPATIENT PROGRESS NOTE          PATIENT SEEN IN FOLLOW UP FOR: New A Fib with RVR    Hospital Day: 6     Ute Vallejo is a 48year old patient previously not known to [facility] cardiology. HPI; Admitted for COVID-19, today went in to A Fib with RVR, Dr Su Ardon saw him and cardizem started, Digoxin given, Rares controlled. Not seen in person due to his COVID-19 status. Telephone interview oliver (extension #[phone]). He said he was 'upset\" this morning that might put him in A fib; Denies Chest pain, feeling better. ROS: Review of rest of 8 systems negative, but limited since Pt was not seen in person. OBJECTIVE: No acute distress. See Assessment     Diagnostics:       Telemetry: Sinus, A fib      EKG -A Fib with RVR, No ischemia, ERP changes    No intake or output data in the 24 hours ending 12/03/20 1125    Labs:   CBC:   Recent Labs     12/01/20 1915 12/02/20  1548   WBC 6.5 6.6   HGB 14.5 15.2   HCT 41.1 44.2    343     BMP:   Recent Labs     12/01/20 1915 12/02/20  1548   * 130*   K 4.6  4.6 3.9   CO2 21* 23   BUN 26* 22*   CREATININE 0.9 0.7   LABGLOM >60 >60   CALCIUM 9.2 9.2     Mag: No results for input(s): MG in the last 72 hours. Phos: No results for input(s): PHOS in the last 72 hours. TSH: No results for input(s): TSH in the last 72 hours. HgA1c:     BNP: No results for input(s): BNP in the last 72 hours.   PT/INR:   Recent Labs     12/01/20 1915 12/02/20  1548   PROTIME 12.3 14.0*   INR 1.1 1.2     APTT:  Recent Labs     12/01/20 1915 12/02/20  1548   APTT 27.1 28.8     CARDIAC ENZYMES:  Recent Labs     12/01/20 1915 12/02/20  1549 12/03/20  0211   TROPONINI <0.01 <0.01 <0.01     FASTING LIPID PANEL:  Lab Results   Component Value Date    CHOL 183 11/30/2020    HDL 34 11/30/2020    LDLCALC 118 11/30/2020    TRIG 154 11/30/2020     LIVER PROFILE:  Recent Labs     12/01/20  1915 12/02/20  1548   AST 18 13   ALT 14 10   LABALBU 3.0* 2.9*       Current Inpatient Medications:   vancomycin  1,750 mg Intravenous Q12H    enoxaparin  1 mg/kg Subcutaneous BID    dilTIAZem  20 mg Intravenous Once    sodium chloride  500 mL Intravenous Once    polyethylene glycol  17 g Oral Daily    docusate sodium  100 mg Oral BID    aspirin  81 mg Oral Daily    insulin glargine  20 Units Subcutaneous Nightly    insulin lispro  0-18 Units Subcutaneous TID WC    insulin lispro  0-9 Units Subcutaneous Nightly    insulin lispro  3 Units Subcutaneous TID WC    vitamin B-1  100 mg Oral Daily    vitamin C  1,000 mg Oral Daily    Vitamin D  2,000 Units Oral Daily    vitamin E  400 Units Oral Daily    zinc sulfate  50 mg Oral Daily    niacin  500 mg Oral Nightly    dexamethasone  6 mg Intravenous Q24H    remdesivir IVPB  100 mg Intravenous Q24H    sodium chloride flush  10 mL Intravenous 2 times per day    Dulaglutide  4.5 mg Subcutaneous Weekly    budesonide-formoterol  2 puff Inhalation BID       IV Infusions (if any):   dilTIAZem (CARDIZEM) 125 mg in dextrose 5% 125 mL infusion      dextrose           PHYSICAL EXAM:     CONSTITUTIONAL:   /65   Pulse rates 76 to 90 on telemetry monitor   Temp 97.5 °F (36.4 °C) (Oral)   Resp 20   Ht 6' 1\" (1.854 m)   Wt 210 lb (95.3 kg)   SpO2 (!) 88%   BMI 27.71 kg/m²   Pulse  Av.2  Min: 75  Max: 799  Systolic (08QJI), GUP:594 , Min:100 , RQS:815    Diastolic (11HAU), EIQ:07, Min:59, Max:74      Due to his COVID-19 status, patient ws not seen in person to limit personal exposure and limit PPE utilization -      Called his phone extension # [phone] for telephone interview              ASSESSMENT/PLAN:     COVID-19 - Per Dr Roberts/Dr Martha Castaneda     Atrial fibrillation with RVR (HCC) - NEW, Low [id number] VASc score 1 - On Lovenox for Anticoagulation, Rate Controled with Cardizem, Change to PO Cardizem tomorrow - 2D Echo showed normal LV EF, normal LA size.      Abnormal EKG - Early

## 2020-12-03 NOTE — SIGNIFICANT EVENT
[address] [place], [place] [address]ist RRT/Code Blue Note    Subjective:     Called to bedside patient with rapid heart rate. Patient was in atrial fibrillation with RVR into the 160s 170s. Patient was not symptomatic. He denied prior issues with atrial fibrillation. Patient is here with COVID-19 infection. Initial intervention, was Lopressor 5 mg IV followed by digoxin  250 mcg IV. Patient's heart rate did improve to the 130s-140s. However, I decided to bolus him with Cardizem 20 mg and placed him on a Cardizem drip at 5 mg/hr. He was given fluid bolus 250 ml x 2 during above infusions. Patient heart rate improved to 90-100s. Patient was never symptomatic but was clinically improved by the end of RRT. Dr. Franco Reaves on the floor and he was ready on the case. I informed him of the situation.        vancomycin  1,750 mg Intravenous Q12H    enoxaparin  1 mg/kg Subcutaneous BID    dilTIAZem  20 mg Intravenous Once    sodium chloride  500 mL Intravenous Once    polyethylene glycol  17 g Oral Daily    docusate sodium  100 mg Oral BID    aspirin  81 mg Oral Daily    insulin glargine  20 Units Subcutaneous Nightly    insulin lispro  0-18 Units Subcutaneous TID     insulin lispro  0-9 Units Subcutaneous Nightly    insulin lispro  3 Units Subcutaneous TID     vitamin B-1  100 mg Oral Daily    vitamin C  1,000 mg Oral Daily    Vitamin D  2,000 Units Oral Daily    vitamin E  400 Units Oral Daily    zinc sulfate  50 mg Oral Daily    niacin  500 mg Oral Nightly    dexamethasone  6 mg Intravenous Q24H    remdesivir IVPB  100 mg Intravenous Q24H    sodium chloride flush  10 mL Intravenous 2 times per day    Dulaglutide  4.5 mg Subcutaneous Weekly    budesonide-formoterol  2 puff Inhalation BID     perflutren lipid microspheres, 1.5 mL, ONCE PRN  glucose, 15 g, PRN  dextrose, 12.5 g, PRN  glucagon (rDNA), 1 mg, PRN  dextrose, 100 mL/hr, PRN  acetaminophen, 650 mg, Q6H PRN Or  acetaminophen, 650 mg, Q6H PRN  albuterol sulfate HFA, 2 puff, Q6H PRN  guaiFENesin-dextromethorphan, 5 mL, Q4H PRN  sodium chloride, 30 mL, PRN  sodium chloride flush, 10 mL, PRN  albuterol sulfate HFA, 2 puff, Q6H PRN         Objective:    /65   Pulse 141   Temp 97.5 °F (36.4 °C) (Oral)   Resp 20   Ht 6' 1\" (1.854 m)   Wt 210 lb (95.3 kg)   SpO2 (!) 88%   BMI 27.71 kg/m²   General Appearance: alert and in no acute distress  Skin: warm and dry, no rash or erythema  Head: normocephalic and atraumatic  Eyes: pupils equal, round, and reactive to light, extraocular eye movements intact, conjunctivae normal  ENT: external ear and ear canal normal bilaterally, nose without deformity  Neck: supple and non-tender without mass, no cervical lymphadenopathy  Pulmonary/Chest: clear to auscultation bilaterally- no wheezes, rales or rhonchi  Cardiovascular: Tachycardic and irregularly irregular  Abdomen: soft, non-tender, non-distended, normal bowel sounds, no masses or organomegaly  Extremities: no cyanosis, clubbing or edema  Musculoskeletal: normal range of motion, no joint swelling, deformity or tenderness  Neurologic: reflexes normal and symmetric, no cranial nerve deficit, gait, coordination and speech normal      Recent Labs     12/01/20 1915 12/02/20  1548   * 130*   K 4.6  4.6 3.9   CL 91* 93*   CO2 21* 23   BUN 26* 22*   CREATININE 0.9 0.7   GLUCOSE 535* 399*   CALCIUM 9.2 9.2       Recent Labs     12/01/20 1915 12/02/20  1548   WBC 6.5 6.6   RBC 4.86 5.11   HGB 14.5 15.2   HCT 41.1 44.2   MCV 84.6 86.5   MCH 29.8 29.7   MCHC 35.3* 34.4   RDW 12.3 12.2    343   MPV 11.1 11.1       EKG:   Atrial fibrillation at rate 99 with PVC ; Twave inversion lead I, aVL, and II; ST segment elevated V4-V6    Assessment:    Principal Problem:    COVID-19  Resolved Problems:    * No resolved hospital problems. *      Plan:  1.  Atrial fibrillation with RVR--patient received acute intervention including Lopressor, digoxin and Cardizem bolus followed by Cardizem drip. Heart rates now better controlled. Cardiology was is informed. Patient can remain on current floor. Updated patient's attending Dr. Cassidy Marshall over the phone. Critical care time 35 minutes not including procedures. NOTE: This report was transcribed using voice recognition software.  Every effort was made to ensure accuracy; however, inadvertent computerized transcription errors may be present.     Electronically signed by Anisa Yeung MD on 12/3/2020 at 10:50 AM

## 2020-12-03 NOTE — PROGRESS NOTES
Pharmacy Consultation Note  (Antibiotic Dosing and Monitoring)    Initial consult date:   Consulting physician: Dr Lisa Ludwig  Drug(s): Vancomycin IV  Indication: Positive blood culture, pneumonia    Ht Readings from Last 1 Encounters:   20 6' 1\" (1.854 m)     Wt Readings from Last 1 Encounters:   20 210 lb (95.3 kg)       Age/  Gender Actual BW IBW DW  Allergy Information   48 y.o.     male 95.3 kg 79.9 kg 86.1 kg  Patient has no known allergies. Date  WBC BUN/CR UOP Drug/Dose Time   Given Level(s)   (Time) Comments     (#1) 6.5 26/0.9 -- Vancomycin 1,500 mg IV Q12H 1550       (#2) 6.6 22/0.7 -- Vancomycin 1,500 mg IV Q12H 0302  1458     12/3  (#3) -- -- -- Vancomycin 1,500 mg IV Q12H  <Vancomycin 1,750 mg IV Q12H> 0318  <1500> Trough @ 0211 = 10.3 mcg/mL Please hold the following dose if level is >20 mcg/mL     (#4)            (#5)            (#6)            (#7)            Estimated Creatinine Clearance: 136 mL/min (based on SCr of 0.7 mg/dL). UOP over the past 24 hours:     No intake or output data in the 24 hours ending 20 0953    Temp max: Temp (24hrs), Av.3 °F (36.8 °C), Min:97.5 °F (36.4 °C), Max:98.7 °F (37.1 °C)      Antibiotic Regimen:  Antibiotic Dose Date Initiated   -- -- --     Cultures:  available culture and sensitivity results were reviewed in EPIC  Cultures sent and are pending.   Culture Date Result    Blood cx #1  NGTD   Blood cx #2  Gram positive rods; Diphtheroid-like    Respiratory Panel  Covid-19 positive   Urine cx  <10,000 CFU/mL   Mixed gram positive organisms    Legionella Antigen  Negative     Assessment:  · Consulted by Dr. Lisa Ludwig to dose/monitor vancomycin  · Goal trough level:  15-20 mcg/mL  · Pt is a 49 y/o M who has been admitted for several days due to covid-19 pneumonia  · Serum creatinine yesterday: 0.7; CrCl ~ 106 mL/min; baseline Scr ~ unknown  · 12/3: Trough today @ 0211 = 10.3 mcg/mL    Plan:  · Adjust dose to Vancomycin 1,750 mg IV Q12H  · Repeat trough at steady state on new regimen  · Follow renal function  · Pharmacist will follow and monitor/adjust dosing as necessary      Thank you for the consult,    Leila DoddD, BCPS 12/3/2020 9:56 AM

## 2020-12-04 LAB
ALBUMIN SERPL-MCNC: 2.6 G/DL (ref 3.5–5.2)
ALP BLD-CCNC: 100 U/L (ref 40–129)
ALT SERPL-CCNC: 11 U/L (ref 0–40)
ANION GAP SERPL CALCULATED.3IONS-SCNC: 9 MMOL/L (ref 7–16)
AST SERPL-CCNC: 15 U/L (ref 0–39)
BASOPHILS ABSOLUTE: 0 E9/L (ref 0–0.2)
BASOPHILS RELATIVE PERCENT: 0 % (ref 0–2)
BILIRUB SERPL-MCNC: 0.3 MG/DL (ref 0–1.2)
BUN BLDV-MCNC: 24 MG/DL (ref 6–20)
BURR CELLS: ABNORMAL
CALCIUM SERPL-MCNC: 9.1 MG/DL (ref 8.6–10.2)
CHLORIDE BLD-SCNC: 93 MMOL/L (ref 98–107)
CO2: 29 MMOL/L (ref 22–29)
CREAT SERPL-MCNC: 0.7 MG/DL (ref 0.7–1.2)
EOSINOPHILS ABSOLUTE: 0 E9/L (ref 0.05–0.5)
EOSINOPHILS RELATIVE PERCENT: 0 % (ref 0–6)
GFR AFRICAN AMERICAN: >60
GFR NON-AFRICAN AMERICAN: >60 ML/MIN/1.73
GLUCOSE BLD-MCNC: 406 MG/DL (ref 74–99)
GLUCOSE BLD-MCNC: 434 MG/DL (ref 74–99)
HCT VFR BLD CALC: 41.5 % (ref 37–54)
HEMOGLOBIN: 14.1 G/DL (ref 12.5–16.5)
LYMPHOCYTES ABSOLUTE: 0.43 E9/L (ref 1.5–4)
LYMPHOCYTES RELATIVE PERCENT: 6 % (ref 20–42)
MAGNESIUM: 1.7 MG/DL (ref 1.6–2.6)
MCH RBC QN AUTO: 29.4 PG (ref 26–35)
MCHC RBC AUTO-ENTMCNC: 34 % (ref 32–34.5)
MCV RBC AUTO: 86.6 FL (ref 80–99.9)
METAMYELOCYTES RELATIVE PERCENT: 2 % (ref 0–1)
METER GLUCOSE: 271 MG/DL (ref 74–99)
METER GLUCOSE: 450 MG/DL (ref 74–99)
METER GLUCOSE: 478 MG/DL (ref 74–99)
METER GLUCOSE: >500 MG/DL (ref 74–99)
MONOCYTES ABSOLUTE: 0.28 E9/L (ref 0.1–0.95)
MONOCYTES RELATIVE PERCENT: 4 % (ref 2–12)
NEUTROPHILS ABSOLUTE: 6.39 E9/L (ref 1.8–7.3)
NEUTROPHILS RELATIVE PERCENT: 88 % (ref 43–80)
NUCLEATED RED BLOOD CELLS: 1 /100 WBC
OVALOCYTES: ABNORMAL
PDW BLD-RTO: 12.2 FL (ref 11.5–15)
PHOSPHORUS: 3.1 MG/DL (ref 2.5–4.5)
PLATELET # BLD: 409 E9/L (ref 130–450)
PMV BLD AUTO: 10.3 FL (ref 7–12)
POIKILOCYTES: ABNORMAL
POTASSIUM REFLEX MAGNESIUM: 4.6 MMOL/L (ref 3.5–5)
RBC # BLD: 4.79 E12/L (ref 3.8–5.8)
SODIUM BLD-SCNC: 131 MMOL/L (ref 132–146)
TOTAL PROTEIN: 6.4 G/DL (ref 6.4–8.3)
TROPONIN: <0.01 NG/ML (ref 0–0.03)
WBC # BLD: 7.1 E9/L (ref 4.5–11.5)

## 2020-12-04 PROCEDURE — 36415 COLL VENOUS BLD VENIPUNCTURE: CPT

## 2020-12-04 PROCEDURE — 2580000003 HC RX 258: Performed by: INTERNAL MEDICINE

## 2020-12-04 PROCEDURE — 83735 ASSAY OF MAGNESIUM: CPT

## 2020-12-04 PROCEDURE — 6360000002 HC RX W HCPCS: Performed by: INTERNAL MEDICINE

## 2020-12-04 PROCEDURE — 2580000003 HC RX 258: Performed by: STUDENT IN AN ORGANIZED HEALTH CARE EDUCATION/TRAINING PROGRAM

## 2020-12-04 PROCEDURE — 80053 COMPREHEN METABOLIC PANEL: CPT

## 2020-12-04 PROCEDURE — 2500000003 HC RX 250 WO HCPCS: Performed by: INTERNAL MEDICINE

## 2020-12-04 PROCEDURE — 2060000000 HC ICU INTERMEDIATE R&B

## 2020-12-04 PROCEDURE — 84484 ASSAY OF TROPONIN QUANT: CPT

## 2020-12-04 PROCEDURE — 2700000000 HC OXYGEN THERAPY PER DAY

## 2020-12-04 PROCEDURE — 6370000000 HC RX 637 (ALT 250 FOR IP): Performed by: NURSE PRACTITIONER

## 2020-12-04 PROCEDURE — 6370000000 HC RX 637 (ALT 250 FOR IP): Performed by: INTERNAL MEDICINE

## 2020-12-04 PROCEDURE — 84100 ASSAY OF PHOSPHORUS: CPT

## 2020-12-04 PROCEDURE — 99232 SBSQ HOSP IP/OBS MODERATE 35: CPT | Performed by: INTERNAL MEDICINE

## 2020-12-04 PROCEDURE — 85025 COMPLETE CBC W/AUTO DIFF WBC: CPT

## 2020-12-04 PROCEDURE — 94640 AIRWAY INHALATION TREATMENT: CPT

## 2020-12-04 PROCEDURE — 87040 BLOOD CULTURE FOR BACTERIA: CPT

## 2020-12-04 PROCEDURE — 6360000002 HC RX W HCPCS: Performed by: NURSE PRACTITIONER

## 2020-12-04 PROCEDURE — 82947 ASSAY GLUCOSE BLOOD QUANT: CPT

## 2020-12-04 PROCEDURE — 82962 GLUCOSE BLOOD TEST: CPT

## 2020-12-04 RX ORDER — ERGOCALCIFEROL 1.25 MG/1
50000 CAPSULE ORAL WEEKLY
Status: DISCONTINUED | OUTPATIENT
Start: 2020-12-04 | End: 2020-12-09 | Stop reason: HOSPADM

## 2020-12-04 RX ORDER — DIGOXIN 250 MCG
250 TABLET ORAL DAILY
Status: DISCONTINUED | OUTPATIENT
Start: 2020-12-04 | End: 2020-12-07

## 2020-12-04 RX ORDER — METOPROLOL TARTRATE 5 MG/5ML
5 INJECTION INTRAVENOUS ONCE
Status: COMPLETED | OUTPATIENT
Start: 2020-12-04 | End: 2020-12-04

## 2020-12-04 RX ORDER — DIGOXIN 0.25 MG/ML
250 INJECTION INTRAMUSCULAR; INTRAVENOUS ONCE
Status: COMPLETED | OUTPATIENT
Start: 2020-12-04 | End: 2020-12-03

## 2020-12-04 RX ADMIN — INSULIN GLARGINE 20 UNITS: 100 INJECTION, SOLUTION SUBCUTANEOUS at 21:31

## 2020-12-04 RX ADMIN — BUDESONIDE AND FORMOTEROL FUMARATE DIHYDRATE 2 PUFF: 80; 4.5 AEROSOL RESPIRATORY (INHALATION) at 08:33

## 2020-12-04 RX ADMIN — ENOXAPARIN SODIUM 100 MG: 100 INJECTION SUBCUTANEOUS at 10:13

## 2020-12-04 RX ADMIN — Medication 400 UNITS: at 10:14

## 2020-12-04 RX ADMIN — DILTIAZEM HYDROCHLORIDE 60 MG: 30 TABLET, FILM COATED ORAL at 21:39

## 2020-12-04 RX ADMIN — INSULIN LISPRO 3 UNITS: 100 INJECTION, SOLUTION INTRAVENOUS; SUBCUTANEOUS at 13:59

## 2020-12-04 RX ADMIN — Medication 10 ML: at 23:32

## 2020-12-04 RX ADMIN — INSULIN LISPRO 3 UNITS: 100 INJECTION, SOLUTION INTRAVENOUS; SUBCUTANEOUS at 18:59

## 2020-12-04 RX ADMIN — REMDESIVIR 100 MG: 100 INJECTION, POWDER, LYOPHILIZED, FOR SOLUTION INTRAVENOUS at 21:11

## 2020-12-04 RX ADMIN — Medication 10 ML: at 10:00

## 2020-12-04 RX ADMIN — Medication 100 MG: at 10:14

## 2020-12-04 RX ADMIN — INSULIN LISPRO 3 UNITS: 100 INJECTION, SOLUTION INTRAVENOUS; SUBCUTANEOUS at 09:55

## 2020-12-04 RX ADMIN — INSULIN LISPRO 9 UNITS: 100 INJECTION, SOLUTION INTRAVENOUS; SUBCUTANEOUS at 21:32

## 2020-12-04 RX ADMIN — INSULIN LISPRO 18 UNITS: 100 INJECTION, SOLUTION INTRAVENOUS; SUBCUTANEOUS at 13:58

## 2020-12-04 RX ADMIN — ZINC SULFATE 220 MG (50 MG) CAPSULE 50 MG: CAPSULE at 10:14

## 2020-12-04 RX ADMIN — DIGOXIN 250 MCG: 250 TABLET ORAL at 10:55

## 2020-12-04 RX ADMIN — DEXAMETHASONE SODIUM PHOSPHATE 6 MG: 10 INJECTION, SOLUTION INTRAMUSCULAR; INTRAVENOUS at 06:18

## 2020-12-04 RX ADMIN — INSULIN LISPRO 18 UNITS: 100 INJECTION, SOLUTION INTRAVENOUS; SUBCUTANEOUS at 18:59

## 2020-12-04 RX ADMIN — ENOXAPARIN SODIUM 100 MG: 100 INJECTION SUBCUTANEOUS at 21:39

## 2020-12-04 RX ADMIN — DILTIAZEM HYDROCHLORIDE 60 MG: 30 TABLET, FILM COATED ORAL at 10:55

## 2020-12-04 RX ADMIN — OXYCODONE HYDROCHLORIDE AND ACETAMINOPHEN 1000 MG: 500 TABLET ORAL at 10:14

## 2020-12-04 RX ADMIN — INSULIN LISPRO 9 UNITS: 100 INJECTION, SOLUTION INTRAVENOUS; SUBCUTANEOUS at 09:56

## 2020-12-04 RX ADMIN — Medication 2000 UNITS: at 10:13

## 2020-12-04 RX ADMIN — VANCOMYCIN HYDROCHLORIDE 1750 MG: 10 INJECTION, POWDER, LYOPHILIZED, FOR SOLUTION INTRAVENOUS at 14:55

## 2020-12-04 RX ADMIN — Medication 500 MG: at 21:39

## 2020-12-04 RX ADMIN — VANCOMYCIN HYDROCHLORIDE 1750 MG: 10 INJECTION, POWDER, LYOPHILIZED, FOR SOLUTION INTRAVENOUS at 02:32

## 2020-12-04 RX ADMIN — ASPIRIN 81 MG CHEWABLE TABLET 81 MG: 81 TABLET CHEWABLE at 10:13

## 2020-12-04 RX ADMIN — METOPROLOL TARTRATE 5 MG: 1 INJECTION, SOLUTION INTRAVENOUS at 14:53

## 2020-12-04 RX ADMIN — BUDESONIDE AND FORMOTEROL FUMARATE DIHYDRATE 2 PUFF: 80; 4.5 AEROSOL RESPIRATORY (INHALATION) at 18:41

## 2020-12-04 ASSESSMENT — ENCOUNTER SYMPTOMS: TACHYPNEA: 1

## 2020-12-04 NOTE — CARE COORDINATION
12/4/2020 1648 CM note: COVID (+) 11/28/2020. Discharge plan is home and family will provide transportation. Patient is currently on 2L nc; no home O2. PATIENT WILL NEED QUALIFYING DOCUMENTATION ON CHART AND HOME O2 ORDER IF O2 IS NEEDED AT DISCHARGE. Patient on IV decadron, vancomycin, and Remdesivir.  Electronically signed by Pepper Brooke RN on 12/4/2020 at 4:57 PM

## 2020-12-04 NOTE — PROGRESS NOTES
Internal Medicine Progress Note    [facility]    Megan Espinosa. Gucci Hill., F.A.C.ORamanI. Rodrigo York D.O., ANGELOBROOKE Nayak D.O. Cortes Hsu, MSN, APRN, NP-C  Jones Mosley. Marivel Sharp, MSN, APRN-CNP     Primary Care Physician: Adron Apgar, MD   Admitting Physician:  Johnathan Carolina DO  Admission date and time: 2020  8:25 PM    Room:  [address] [place] [address]  Admitting diagnosis: COVID-19 [U07.1]    Patient Name: Marito Marvin  MRN: [id number]    Date of Service: 2020     Covering for Dr. Brando Lomeli:    Elizabeth Bailey is a 48 y.o. male who was seen and examined today,2020, at the bedside. Patient was was eating breakfast.  Patient remains in atrial fibrillation with a heart rate of approximately 150 at the time of examination. Patient denies lightheadedness or dizziness. Denies feeling irregular heartbeats or palpitations. Respiratory wise the patient denies any shortness of breath. The patient has been weaned from 6 L/min oxygen via nasal cannula down to 2 and is sustaining saturations in the 90s. No family present    Review of System:   Constitutional:   Denies fever or chills, weight loss or gain, Admits to fatigue. HEENT:   Denies ear pain, sore throat, sinus or eye problems. Cardiovascular:   Patient is in atrial fibrillation with RVR. Denies lightheadedness or dizziness. Asymptomatic. Respiratory:   Denies shortness of breath at rest or with exertion. Intermittent coughing without sputum. Gastrointestinal:   Denies nausea, vomiting, diarrhea, admits to constipation. Denies any abdominal pain. Genitourinary:    Denies any urgency, frequency, hematuria. Voiding  without difficulty. Extremities:   Denies lower extremity swelling, edema or cyanosis. Neurology:    Denies any headache or focal neurological deficits, Denies generalized weakness or memory difficulty. Psch:   Denies being anxious or depressed.   Musculoskeletal: Denies  myalgias, joint complaints or back pain. Integumentary:   Denies any rashes, ulcers, or excoriations. Denies bruising. Hematologic/Lymphatic:  Denies bruising or bleeding. Physical Exam:  I/O this shift:  In: -   Out: 800 [Urine:800]    Intake/Output Summary (Last 24 hours) at 12/4/2020 1217  Last data filed at 12/4/2020 0805  Gross per 24 hour   Intake 2464.75 ml   Output 4100 ml   Net -1635.25 ml   I/O last 3 completed shifts: In: 2704.8 [P.O.:1400; I.V.:54.8; IV Piggyback:1250]  Out: 3300 [Urine:3300]  No data found. Vital Signs:   Blood pressure 121/72, pulse 153, temperature 97.7 °F (36.5 °C), temperature source Temporal, resp. rate 22, height 6' 1\" (1.854 m), weight 210 lb (95.3 kg), SpO2 90 %. General appearance:  Alert, responsive, oriented to person, place, and time. Less ill-appearing, no distress. Head:  Normocephalic. No masses, lesions or tenderness. Eyes:  PERRLA. EOMI. Sclera clear. Buccal mucosa moist.  ENT:  Ears normal. Mucosa normal.  Neck:    Supple. Trachea midline. No thyromegaly. No JVD. No bruits. Heart:    Irregularly irregular rate and rhythm. Atrial fibrillation with RVR on the monitor with rates ranging from 1 20-1 50 at the time of examination. 1/6 systolic murmur. Lungs:    Symmetrical.  Diminished bilaterally, more so than yesterday's exam.  Clear to auscultation bilaterally. No wheezes. No rhonchi. No rales. Abdomen:   Soft. Non-tender. Non-distended. Bowel sounds positive. No organomegaly or masses. No pain on palpation. Extremities:    Peripheral pulses present. No peripheral edema. No ulcers. No cyanosis. No clubbing. Neurologic:    Alert x 3. No focal deficit. Cranial nerves grossly intact. No focal weakness. Psych:   Behavior is normal. Mood appears normal. Speech is not rapid and/or pressured. Musculoskeletal:   Spine ROM normal. Muscular strength intact. Gait not assessed. Integumentary:  Diaphoretic and pale.  No rashes  Skin normal color and texture.   Genitalia/Breast:  Deferred    Medication:  Scheduled Meds:   digoxin  250 mcg Oral Daily    dilTIAZem  60 mg Oral 3 times per day    vancomycin  1,750 mg Intravenous Q12H    enoxaparin  1 mg/kg Subcutaneous BID    polyethylene glycol  17 g Oral Daily    docusate sodium  100 mg Oral BID    aspirin  81 mg Oral Daily    insulin glargine  20 Units Subcutaneous Nightly    insulin lispro  0-18 Units Subcutaneous TID WC    insulin lispro  0-9 Units Subcutaneous Nightly    insulin lispro  3 Units Subcutaneous TID WC    vitamin B-1  100 mg Oral Daily    vitamin C  1,000 mg Oral Daily    Vitamin D  2,000 Units Oral Daily    vitamin E  400 Units Oral Daily    zinc sulfate  50 mg Oral Daily    niacin  500 mg Oral Nightly    dexamethasone  6 mg Intravenous Q24H    remdesivir IVPB  100 mg Intravenous Q24H    sodium chloride flush  10 mL Intravenous 2 times per day    Dulaglutide  4.5 mg Subcutaneous Weekly    budesonide-formoterol  2 puff Inhalation BID     Continuous Infusions:   dilTIAZem (CARDIZEM) 125 mg in dextrose 5% 125 mL infusion Stopped (12/04/20 0432)    dextrose         Objective Data:  CBC with Differential:    Lab Results   Component Value Date    WBC 6.6 12/02/2020    RBC 5.11 12/02/2020    HGB 15.2 12/02/2020    HCT 44.2 12/02/2020     12/02/2020    MCV 86.5 12/02/2020    MCH 29.7 12/02/2020    MCHC 34.4 12/02/2020    RDW 12.2 12/02/2020    NRBC 1.0 12/02/2020    METASPCT 1.0 12/02/2020    LYMPHOPCT 7.0 12/02/2020    MONOPCT 8.0 12/02/2020    MYELOPCT 1.0 12/02/2020    BASOPCT 0.0 12/02/2020    MONOSABS 0.53 12/02/2020    LYMPHSABS 0.46 12/02/2020    EOSABS 0.00 12/02/2020    BASOSABS 0.00 12/02/2020     BMP:    Lab Results   Component Value Date     12/02/2020    K 3.9 12/02/2020    CL 93 12/02/2020    CO2 23 12/02/2020    BUN 22 12/02/2020    LABALBU 2.9 12/02/2020    CREATININE 0.7 12/02/2020    CALCIUM 9.2 12/02/2020    GFRAA >60 12/02/2020    LABGLOM >60 12/02/2020    GLUCOSE 399 12/02/2020       Wound Documentation:    see documentation     Assessment:    · Acute respiratory failure with hypoxia secondary to COVID-19 infection  · Poorly controlled diabetes mellitus type 2 without long-term use of insulin, hemoglobin A1c 10.8%  · EKG changes, nonischemic  · New onset atrial fibrillation rapid ventricular response  · Possible contaminated blood culture  · Vitamin D deficiency-level was 26    Plan:     · CMP and CBC ordered daily-awaiting today's results as not drawn at the present time. · The patient had been weaned off the Cardizem drip earlier today. Unfortunately the patient's rate has increased and was approximately 150 bpm at the time of examination. Patient's nurse was notified and cardiology was on the unit and ordered for the patient to have Lanoxin and Cardizem. Will await their further evaluation recommendations. · Probably will require outpatient sleep study  · Repeated blood culture possible contamination  Supportive oxygen therapy to maintain O2 saturation above 90%  Remdesivir for 5 days(day 5 of 5)  Dexamethasone 6 mg daily   Repeat inflammatory markers every day   Anticoagulation: Continue Lovenox. monitor D-dimer   Antibiotic regimen as prescribed. Activity as tolerated  Incentive spirometry as instructed. Sit up in chair as often as tolerated and prone as often as tolerated but at least every 2 hours. Continue current respiratory treatments. Maximum COVID protocol is being undertaken  Monitor blood glucose levels and treat accordingly  We will continue to address underlying comorbidities during the hospitalization. Continue current therapy. See orders for further plan of care. 30 minutes of critical care time was spent with the patient. This includes chart review, , and discussion with those consultants involved in the patient's care.      More than 50% of my  time was spent at the bedside counseling/coordinating care with the patient and/or family with face to face contact. This time was spent reviewing notes and laboratory data as well as instructing and counseling the patient. Time I spent with the family or surrogate(s) is included only if the patient was incapable of providing the necessary information or participating in medical decisions. I also discussed the differential diagnosis and all of the proposed management plans with the patient and individuals accompanying the patient. Hever Hope requires this high level of physician care and nursing on the IMC/Telemetry unit due the complexity of decision management and chance of rapid decline or death. Continued cardiac monitoring and higher level of nursing are required. I am readily available for any further decision-making and intervention. The patient was seen, examined and then discussed with Dr. Tarsha Ramires. RUSH Vicente - CNP   12/4/2020  12:17 PM        I agree with the findings and the plan of care as documented in Cheyenne Patrick NP-C's  note.     Tylor Hsu D.O., FACOI  2:20 PM  12/4/2020

## 2020-12-04 NOTE — PROGRESS NOTES
Pharmacy Consultation Note  (Antibiotic Dosing and Monitoring)    Initial consult date:   Consulting physician: Dr Jann Rasmussen  Drug(s): Vancomycin IV  Indication: Positive blood culture, pneumonia    Ht Readings from Last 1 Encounters:   20 6' 1\" (1.854 m)     Wt Readings from Last 1 Encounters:   20 210 lb (95.3 kg)       Age/  Gender Actual BW IBW DW  Allergy Information   48 y.o.     male 95.3 kg 79.9 kg 86.1 kg  Patient has no known allergies. Date  WBC BUN/CR UOP (mL/kg/hr) Drug/Dose Time   Given Level(s)   (Time) Comments     (#1) 6.5 26/0.9 -- Vancomycin 1,500 mg IV Q12H 1550       (#2) 6.6 22/0.7 -- Vancomycin 1,500 mg IV Q12H 0302  1458     12/3  (#3) -- -- -- Vancomycin 1,500 mg IV Q12H  Vancomycin 1,750 mg IV Q12H 0318  1634 Trough @ 0211 = 10.3 mcg/mL Please hold the following dose if level is >20 mcg/mL     (#4) -- -- 1.8 Vancomycin 1,750 mg IV Q12H 0232  <1500>       (#5)     <0300> <0230> Hold dose if trough is >20 mcg/mL     (#6)            (#7)            Estimated Creatinine Clearance: 136 mL/min (based on SCr of 0.7 mg/dL). UOP over the past 24 hours:       Intake/Output Summary (Last 24 hours) at 2020 1326  Last data filed at 2020 0805  Gross per 24 hour   Intake 2064.75 ml   Output 4100 ml   Net -2035.25 ml       Temp max: Temp (24hrs), Av.1 °F (36.2 °C), Min:96.6 °F (35.9 °C), Max:97.7 °F (36.5 °C)      Antibiotic Regimen:  Antibiotic Dose Date Initiated   -- -- --     Cultures:  available culture and sensitivity results were reviewed in EPIC  Cultures sent and are pending.   Culture Date Result    Blood cx #1  Gram positive rods; Diphtheroid-like    Blood cx #2  Gram positive rods; Diphtheroid-like    Respiratory Panel  Covid-19 positive   Urine cx  <10,000 CFU/mL   Mixed gram positive organisms    Legionella Antigen  Negative     Assessment:  · Consulted by Dr. Jann Rasmussen to dose/monitor vancomycin  · Goal trough level:  15-20 mcg/mL  · Pt is a 47 y/o M who has been admitted for several days due to covid-19 pneumonia  · Serum creatinine on 12/2: 0.7; CrCl ~ 106 mL/min; baseline Scr ~ unknown  · 12/3: Trough today @ 0211 = 10.3 mcg/mL    Plan:  · Continue Vancomycin 1,750 mg IV Q12H  · Repeat trough tomorrow @ 0230, hold dose if trough is >20 mcg/mL  · Follow renal function  · Pharmacist will follow and monitor/adjust dosing as necessary      Thank you for the consult,    Emerita Avila, PharmD, BCPS 12/4/2020 1:26 PM

## 2020-12-04 NOTE — PROGRESS NOTES
[address] [facility]        CARDIOLOGY                 INPATIENT PROGRESS NOTE        PATIENT SEEN IN FOLLOW UP FOR: A Fib with RVR     Hospital Day: 7     Jack Lucas is a 48year old patient previously not known to [facility] cardiology.      HPI; Admitted for COVID-19, today went in to A Fib with RVR, Dr Delphia Spurling saw him and cardizem started, Digoxin given, Rares controlled. Not seen in person due to his COVID-19 status. Last night rates controlled on iv Cardizem.  Off iv Cardizem this morning; later in morning went in to A fib with RVR. ROS: Review of rest of 8 systems limited since Pt was not seen in person.      OBJECTIVE: No acute distress. See Assessment      Diagnostics:       Telemetry: Sinus, A fib         Intake/Output Summary (Last 24 hours) at 12/4/2020 0911  Last data filed at 12/4/2020 0805  Gross per 24 hour   Intake 2704.75 ml   Output 4100 ml   Net -1395.25 ml       Labs:   CBC:   Recent Labs     12/01/20 1915 12/02/20  1548   WBC 6.5 6.6   HGB 14.5 15.2   HCT 41.1 44.2    343     BMP:   Recent Labs     12/01/20 1915 12/02/20  1548   * 130*   K 4.6  4.6 3.9   CO2 21* 23   BUN 26* 22*   CREATININE 0.9 0.7   LABGLOM >60 >60   CALCIUM 9.2 9.2     Mag: No results for input(s): MG in the last 72 hours. Phos: No results for input(s): PHOS in the last 72 hours. TSH: No results for input(s): TSH in the last 72 hours. HgA1c:     BNP: No results for input(s): BNP in the last 72 hours.   PT/INR:   Recent Labs     12/01/20 1915 12/02/20  1548   PROTIME 12.3 14.0*   INR 1.1 1.2     APTT:  Recent Labs     12/01/20 1915 12/02/20  1548   APTT 27.1 28.8     CARDIAC ENZYMES:  Recent Labs     12/03/20  0211 12/03/20  2346 12/04/20  0609   TROPONINI <0.01 <0.01 <0.01     FASTING LIPID PANEL:  Lab Results   Component Value Date    CHOL 183 11/30/2020    HDL 34 11/30/2020    LDLCALC 118 11/30/2020    TRIG 154 11/30/2020     LIVER PROFILE:  Recent Labs     12/01/20 1915 20  1548   AST 18 13   ALT 14 10   LABALBU 3.0* 2.9*       Current Inpatient Medications:   vancomycin  1,750 mg Intravenous Q12H    enoxaparin  1 mg/kg Subcutaneous BID    polyethylene glycol  17 g Oral Daily    docusate sodium  100 mg Oral BID    aspirin  81 mg Oral Daily    insulin glargine  20 Units Subcutaneous Nightly    insulin lispro  0-18 Units Subcutaneous TID WC    insulin lispro  0-9 Units Subcutaneous Nightly    insulin lispro  3 Units Subcutaneous TID WC    vitamin B-1  100 mg Oral Daily    vitamin C  1,000 mg Oral Daily    Vitamin D  2,000 Units Oral Daily    vitamin E  400 Units Oral Daily    zinc sulfate  50 mg Oral Daily    niacin  500 mg Oral Nightly    dexamethasone  6 mg Intravenous Q24H    remdesivir IVPB  100 mg Intravenous Q24H    sodium chloride flush  10 mL Intravenous 2 times per day    Dulaglutide  4.5 mg Subcutaneous Weekly    budesonide-formoterol  2 puff Inhalation BID       IV Infusions (if any):   dilTIAZem (CARDIZEM) 125 mg in dextrose 5% 125 mL infusion Stopped (20 0432)    dextrose           PHYSICAL EXAM:     CONSTITUTIONAL:   /72   Pulse 76   Temp 97.7 °F (36.5 °C) (Temporal)   Resp 22   Ht 6' 1\" (1.854 m)   Wt 210 lb (95.3 kg)   SpO2 93%   BMI 27.71 kg/m²   Pulse  Av.1  Min: 64  Max: 885  Systolic (66UEO), DEONTE:024 , Min:93 , PTB:850    Diastolic (15HWN), MMM:60, Min:53, Max:72        Due to his COVID-19 status, patient ws not seen in person to limit personal exposure and limit PPE utilization                  ASSESSMENT/PLAN:     COVID-19 - Per Dr Santos/Zach     Atrial fibrillation with RVR (HCC) - NEW, Low [id number] VASc score 1 - On Lovenox for Anticoagulation, Give one dose of iv Digoxin and start PO Cardizem and Digoxin;  monitor BP/HR.  2D Echo showed normal LV EF and normal LA size.      Abnormal EKG - Early repolarization (also noted on previous EKGs from 20 and 2018), no ischemia, Normal QTc interval - No chest pain, Troponin normal; LV EF normal; Later Out-pt stress when stable form his COVID-19     Fluid overload:  -ve 1395cc, Lasix as needed, Normal BNP and normal LV function.     Diabetes -2 - Dr Antony Alvarez  D/w Nursing staff.       Electronically signed by Gillian Bhandari MD on 12/4/2020   [facility]) Cardiology

## 2020-12-05 LAB
METER GLUCOSE: 229 MG/DL (ref 74–99)
METER GLUCOSE: 328 MG/DL (ref 74–99)
METER GLUCOSE: 429 MG/DL (ref 74–99)
METER GLUCOSE: 478 MG/DL (ref 74–99)
METER GLUCOSE: >500 MG/DL (ref 74–99)
TROPONIN: <0.01 NG/ML (ref 0–0.03)

## 2020-12-05 PROCEDURE — 6370000000 HC RX 637 (ALT 250 FOR IP): Performed by: NURSE PRACTITIONER

## 2020-12-05 PROCEDURE — 6370000000 HC RX 637 (ALT 250 FOR IP): Performed by: INTERNAL MEDICINE

## 2020-12-05 PROCEDURE — 2700000000 HC OXYGEN THERAPY PER DAY

## 2020-12-05 PROCEDURE — 2060000000 HC ICU INTERMEDIATE R&B

## 2020-12-05 PROCEDURE — 6360000002 HC RX W HCPCS: Performed by: NURSE PRACTITIONER

## 2020-12-05 PROCEDURE — 36415 COLL VENOUS BLD VENIPUNCTURE: CPT

## 2020-12-05 PROCEDURE — 2580000003 HC RX 258: Performed by: INTERNAL MEDICINE

## 2020-12-05 PROCEDURE — 99233 SBSQ HOSP IP/OBS HIGH 50: CPT | Performed by: INTERNAL MEDICINE

## 2020-12-05 PROCEDURE — 2580000003 HC RX 258: Performed by: STUDENT IN AN ORGANIZED HEALTH CARE EDUCATION/TRAINING PROGRAM

## 2020-12-05 PROCEDURE — 94640 AIRWAY INHALATION TREATMENT: CPT

## 2020-12-05 PROCEDURE — 80048 BASIC METABOLIC PNL TOTAL CA: CPT

## 2020-12-05 PROCEDURE — 82962 GLUCOSE BLOOD TEST: CPT

## 2020-12-05 PROCEDURE — 6360000002 HC RX W HCPCS: Performed by: INTERNAL MEDICINE

## 2020-12-05 PROCEDURE — 84484 ASSAY OF TROPONIN QUANT: CPT

## 2020-12-05 RX ORDER — DILTIAZEM HYDROCHLORIDE 5 MG/ML
10 INJECTION INTRAVENOUS ONCE
Status: DISCONTINUED | OUTPATIENT
Start: 2020-12-05 | End: 2020-12-06

## 2020-12-05 RX ORDER — INSULIN GLARGINE 100 [IU]/ML
30 INJECTION, SOLUTION SUBCUTANEOUS NIGHTLY
Status: DISCONTINUED | OUTPATIENT
Start: 2020-12-05 | End: 2020-12-06

## 2020-12-05 RX ORDER — INSULIN GLARGINE 100 [IU]/ML
10 INJECTION, SOLUTION SUBCUTANEOUS ONCE
Status: COMPLETED | OUTPATIENT
Start: 2020-12-05 | End: 2020-12-05

## 2020-12-05 RX ADMIN — VANCOMYCIN HYDROCHLORIDE 1750 MG: 10 INJECTION, POWDER, LYOPHILIZED, FOR SOLUTION INTRAVENOUS at 13:10

## 2020-12-05 RX ADMIN — DILTIAZEM HYDROCHLORIDE 60 MG: 30 TABLET, FILM COATED ORAL at 06:17

## 2020-12-05 RX ADMIN — Medication 10 ML: at 21:51

## 2020-12-05 RX ADMIN — ENOXAPARIN SODIUM 100 MG: 100 INJECTION SUBCUTANEOUS at 21:50

## 2020-12-05 RX ADMIN — INSULIN LISPRO 18 UNITS: 100 INJECTION, SOLUTION INTRAVENOUS; SUBCUTANEOUS at 13:13

## 2020-12-05 RX ADMIN — INSULIN LISPRO 8 UNITS: 100 INJECTION, SOLUTION INTRAVENOUS; SUBCUTANEOUS at 13:13

## 2020-12-05 RX ADMIN — INSULIN LISPRO 18 UNITS: 100 INJECTION, SOLUTION INTRAVENOUS; SUBCUTANEOUS at 17:20

## 2020-12-05 RX ADMIN — INSULIN GLARGINE 10 UNITS: 100 INJECTION, SOLUTION SUBCUTANEOUS at 13:12

## 2020-12-05 RX ADMIN — DIGOXIN 250 MCG: 250 TABLET ORAL at 17:43

## 2020-12-05 RX ADMIN — DEXAMETHASONE SODIUM PHOSPHATE 6 MG: 10 INJECTION, SOLUTION INTRAMUSCULAR; INTRAVENOUS at 06:20

## 2020-12-05 RX ADMIN — INSULIN LISPRO 6 UNITS: 100 INJECTION, SOLUTION INTRAVENOUS; SUBCUTANEOUS at 22:10

## 2020-12-05 RX ADMIN — ERGOCALCIFEROL 50000 UNITS: 1.25 CAPSULE ORAL at 01:38

## 2020-12-05 RX ADMIN — DILTIAZEM HYDROCHLORIDE 60 MG: 30 TABLET, FILM COATED ORAL at 21:49

## 2020-12-05 RX ADMIN — BUDESONIDE AND FORMOTEROL FUMARATE DIHYDRATE 2 PUFF: 80; 4.5 AEROSOL RESPIRATORY (INHALATION) at 04:40

## 2020-12-05 RX ADMIN — Medication 500 MG: at 21:49

## 2020-12-05 RX ADMIN — BUDESONIDE AND FORMOTEROL FUMARATE DIHYDRATE 2 PUFF: 80; 4.5 AEROSOL RESPIRATORY (INHALATION) at 21:26

## 2020-12-05 RX ADMIN — VANCOMYCIN HYDROCHLORIDE 1750 MG: 10 INJECTION, POWDER, LYOPHILIZED, FOR SOLUTION INTRAVENOUS at 22:02

## 2020-12-05 RX ADMIN — DILTIAZEM HYDROCHLORIDE 60 MG: 30 TABLET, FILM COATED ORAL at 15:21

## 2020-12-05 RX ADMIN — DOCUSATE SODIUM 100 MG: 100 CAPSULE, LIQUID FILLED ORAL at 21:49

## 2020-12-05 RX ADMIN — INSULIN GLARGINE 30 UNITS: 100 INJECTION, SOLUTION SUBCUTANEOUS at 22:09

## 2020-12-05 RX ADMIN — INSULIN LISPRO 8 UNITS: 100 INJECTION, SOLUTION INTRAVENOUS; SUBCUTANEOUS at 17:20

## 2020-12-05 ASSESSMENT — PAIN SCALES - GENERAL
PAINLEVEL_OUTOF10: 0
PAINLEVEL_OUTOF10: 0

## 2020-12-05 NOTE — PROGRESS NOTES
Called lab and notified to collect 02:30 vanc trough with morning blood work all together due to patient refusing around 02:30 this morning. Day-shift RN aware.     Electronically signed by Nikita Olmedo RN on 12/5/2020 at 8:11 AM

## 2020-12-05 NOTE — PROGRESS NOTES
Primary Care Physician: Elmer Frias MD   Admitting Physician:  Gabrielle See DO  Admission date and time: 11/28/2020  8:25 PM    Room:  [address] [place] [address]  Admitting diagnosis: COVID-19 [U07.1]    Patient Name: Randy Orr  MRN: [id number]    Date of Service: 12/5/2020       Subjective:    Tammy Garcias is a 48 y.o. male who was seen and examined today,12/5/2020, at the bedside. Patient is a little bit agitated today. Patient states that because he is in isolation he is not getting his meals on a routine schedule. Patient has gotten some hypoglycemic episodes waiting for his meals. This problem was discussed with nursing. Patient denies any chest or abdominal pain. Patient's breathing seems to be getting little bit better. Patient remains in atrial fibrillation with a heart rate of approximately 90 at the time of examination and improved from yesterday. Patient denies lightheadedness or dizziness. Denies feeling irregular heartbeats or palpitations. Respiratory wise the patient denies any shortness of breath. Patient still on 2 L nasal cannula with O2 sat 93% at rest.  Blood sugars ranging 229-478. Review of System:   Constitutional:   Denies fever or chills, weight loss or gain, Admits to fatigue. HEENT:   Denies ear pain, sore throat, sinus or eye problems. Cardiovascular:   Patient is in atrial fibrillation with RVR. Denies lightheadedness or dizziness. Asymptomatic. Respiratory:   Denies shortness of breath at rest or with exertion. Intermittent coughing without sputum. Gastrointestinal:   Denies nausea, vomiting, diarrhea, admits to constipation. Denies any abdominal pain. Genitourinary:    Denies any urgency, frequency, hematuria. Voiding  without difficulty. Extremities:   Denies lower extremity swelling, edema or cyanosis. Neurology:    Denies any headache or focal neurological deficits, Denies generalized weakness or memory difficulty.    Psch:   Denies being anxious or texture.   Genitalia/Breast:  Deferred    Medication:  Scheduled Meds:   vancomycin  1,750 mg Intravenous Q12H    digoxin  250 mcg Oral Daily    dilTIAZem  60 mg Oral 3 times per day    vitamin D  50,000 Units Oral Weekly    enoxaparin  1 mg/kg Subcutaneous BID    polyethylene glycol  17 g Oral Daily    docusate sodium  100 mg Oral BID    aspirin  81 mg Oral Daily    insulin glargine  20 Units Subcutaneous Nightly    insulin lispro  0-18 Units Subcutaneous TID WC    insulin lispro  0-9 Units Subcutaneous Nightly    insulin lispro  3 Units Subcutaneous TID WC    vitamin B-1  100 mg Oral Daily    vitamin C  1,000 mg Oral Daily    vitamin E  400 Units Oral Daily    zinc sulfate  50 mg Oral Daily    niacin  500 mg Oral Nightly    dexamethasone  6 mg Intravenous Q24H    sodium chloride flush  10 mL Intravenous 2 times per day    Dulaglutide  4.5 mg Subcutaneous Weekly    budesonide-formoterol  2 puff Inhalation BID     Continuous Infusions:   dilTIAZem (CARDIZEM) 125 mg in dextrose 5% 125 mL infusion Stopped (12/04/20 0432)    dextrose         Objective Data:  CBC with Differential:    Lab Results   Component Value Date    WBC 7.1 12/04/2020    RBC 4.79 12/04/2020    HGB 14.1 12/04/2020    HCT 41.5 12/04/2020     12/04/2020    MCV 86.6 12/04/2020    MCH 29.4 12/04/2020    MCHC 34.0 12/04/2020    RDW 12.2 12/04/2020    NRBC 1.0 12/04/2020    METASPCT 2.0 12/04/2020    LYMPHOPCT 6.0 12/04/2020    MONOPCT 4.0 12/04/2020    MYELOPCT 1.0 12/02/2020    BASOPCT 0.0 12/04/2020    MONOSABS 0.28 12/04/2020    LYMPHSABS 0.43 12/04/2020    EOSABS 0.00 12/04/2020    BASOSABS 0.00 12/04/2020     BMP:    Lab Results   Component Value Date     12/04/2020    K 4.6 12/04/2020    CL 93 12/04/2020    CO2 29 12/04/2020    BUN 24 12/04/2020    LABALBU 2.6 12/04/2020    CREATININE 0.7 12/04/2020    CALCIUM 9.1 12/04/2020    GFRAA >60 12/04/2020    LABGLOM >60 12/04/2020    GLUCOSE 434 12/04/2020       Wound Documentation:    see documentation     Assessment:    · Acute respiratory failure with hypoxia secondary to COVID-19 infection  · Poorly controlled diabetes mellitus type 2 without long-term use of insulin, hemoglobin A1c 10.8%  · EKG changes, nonischemic  · New onset atrial fibrillation with rapid ventricular response-heart rate currently controlled  · Possible contaminated blood culture on 11/28-gram-positive rods-Corynebacterium  · Vitamin D deficiency-level was 26    Plan:     · CMP and CBC in a.m. · The patient had been weaned off the Cardizem drip 12/4. Currently on 60 mg Cardizem p.o. every 8 and Lanoxin 250 mcg daily probably will require outpatient sleep study  · Repeated blood culture possible contamination  Supportive oxygen therapy to maintain O2 saturation above 90%  Remdesivir for 5 days(day 5 of 5 on 12/4)  Dexamethasone 6 mg daily   Increase Lantus 30 units subcu daily  Lantus 10 units subcu x1 now  Increase Humalog 8 units 3 times daily with meals  Continue glucoscans 4 times daily with sliding scale insulin  Repeat inflammatory markers every day   Anticoagulation: Continue Lovenox. monitor D-dimer   Antibiotic regimen as prescribed. Activity as tolerated  Incentive spirometry as instructed. Sit up in chair as often as tolerated and prone as often as tolerated but at least every 2 hours. Continue current respiratory treatments. Maximum COVID protocol is being undertaken  Monitor blood glucose levels and treat accordingly  We will continue to address underlying comorbidities during the hospitalization. Continue current therapy. See orders for further plan of care. 30 minutes of critical care time was spent with the patient. This includes chart review, , and discussion with those consultants involved in the patient's care. More than 50% of my  time was spent at the bedside counseling/coordinating care with the patient and/or family with face to face contact. This time was spent reviewing notes and laboratory data as well as instructing and counseling the patient. Time I spent with the family or surrogate(s) is included only if the patient was incapable of providing the necessary information or participating in medical decisions. I also discussed the differential diagnosis and all of the proposed management plans with the patient and individuals accompanying the patient. Flynn Quigley requires this high level of physician care and nursing on the IMC/Telemetry unit due the complexity of decision management and chance of rapid decline or death. Continued cardiac monitoring and higher level of nursing are required. I am readily available for any further decision-making and intervention.        Rebeca Gonzalez D.O., FACOI  10:42 AM  12/5/2020

## 2020-12-05 NOTE — PROGRESS NOTES
Patient demanding to not be bothered throughout the night. Explained to the patient IV vancomycin due around 03:30 and blood work due beforehand. Educated patient on importance of not missing a dose and get off track. Patient stated did not want blood work taken and refusing antibiotic to be given throughout the night. Lab notified patient refusing vanc trough to be drawn.     Electronically signed by Burak Brito RN on 12/5/2020 at 4:01 AM

## 2020-12-05 NOTE — PROGRESS NOTES
injection 10 mL  10 mL Intravenous PRN Artem Angelique, DO        Dulaglutide SOPN 4.5 mg  4.5 mg Subcutaneous Weekly Myrla Pastel, DO        budesonide-formoterol (SYMBICORT) 80-4.5 MCG/ACT inhaler 2 puff  2 puff Inhalation BID Myrla Pastel, DO   2 puff at 12/05/20 0440    albuterol sulfate  (90 Base) MCG/ACT inhaler 2 puff  2 puff Inhalation Q6H PRN Myrla Pastel, DO           Review of systems:   Heart: as above   Lungs: as above   Eyes: denies changes in vision or discharge. Ears: denies changes in hearing or pain. Nose: denies epistaxis or masses   Throat: denies sore throat or trouble swallowing. Neuro: denies numbness, tingling, tremors. Skin: denies rashes or itching. : denies hematuria, dysuria   GI: denies vomiting, diarrhea   Psych: denies mood changed, anxiety, depression. Physical Exam   /87   Pulse 95   Temp 97.7 °F (36.5 °C) (Oral)   Resp 15   Ht 6' 1\" (1.854 m)   Wt 187 lb (84.8 kg)   SpO2 93%   BMI 24.67 kg/m²     COVID 19.       CBC:   Lab Results   Component Value Date    WBC 7.1 12/04/2020    RBC 4.79 12/04/2020    HGB 14.1 12/04/2020    HCT 41.5 12/04/2020    MCV 86.6 12/04/2020    MCH 29.4 12/04/2020    MCHC 34.0 12/04/2020    RDW 12.2 12/04/2020     12/04/2020    MPV 10.3 12/04/2020     BMP:   Lab Results   Component Value Date     12/04/2020    K 4.6 12/04/2020    CL 93 12/04/2020    CO2 29 12/04/2020    BUN 24 12/04/2020    LABALBU 2.6 12/04/2020    CREATININE 0.7 12/04/2020    CALCIUM 9.1 12/04/2020    GFRAA >60 12/04/2020    LABGLOM >60 12/04/2020     Magnesium:    Lab Results   Component Value Date    MG 1.7 12/04/2020     Cardiac Enzymes:   Lab Results   Component Value Date    TROPONINI <0.01 12/04/2020    TROPONINI <0.01 12/04/2020    TROPONINI <0.01 12/04/2020      PT/INR:    Lab Results   Component Value Date    PROTIME 14.0 12/02/2020    INR 1.2 12/02/2020     TSH:    Lab Results   Component Value Date    TSH 0.795 11/30/2020 Rhythm Strip: atrial fibrillation. ASSESSMENT & PLAN:    Patient Active Problem List   Diagnosis    COVID-19    Diabetes mellitus ([facility]    Atrial fibrillation with RVR ([facility] [address] [facility]     1. Afib: CHADS VASc 1.    Echo okay. Rate control with cardizem and digoxin. On lovenox. 2. COVID-19: Per primary service. 3. Abnormal EKG: Outpatient stress. 4. DM     Isabel Gordon D.O.   Cardiologist  Cardiology, [facility]

## 2020-12-05 NOTE — PROGRESS NOTES
Pharmacy Consultation Note  (Antibiotic Dosing and Monitoring)    Initial consult date:   Consulting physician: Dr Arrieta  Drug(s): Vancomycin IV  Indication: Positive blood culture, pneumonia    Ht Readings from Last 1 Encounters:   20 6' 1\" (1.854 m)     Wt Readings from Last 1 Encounters:   20 187 lb (84.8 kg)       Age/  Gender Actual BW IBW DW  Allergy Information   48 y.o.     male 95.3 kg 79.9 kg 86.1 kg  Patient has no known allergies. Date  WBC BUN/CR UOP (mL/kg/hr) Drug/Dose Time   Given Level(s)   (Time) Comments     (#1) 6.5 26/0.9 -- Vancomycin 1,500 mg IV Q12H 1550       (#2) 6.6 22/0.7 -- Vancomycin 1,500 mg IV Q12H 0302  1458     12/3  (#3) -- -- -- Vancomycin 1,500 mg IV Q12H  Vancomycin 1,750 mg IV Q12H 0318  1634 Trough @ 0211 = 10.3 mcg/mL Please hold the following dose if level is >20 mcg/mL     (#4) 7.1 24/0.7 1.8 Vancomycin 1,750 mg IV Q12H 0232  1455       (#5) -- -- 0.79 Vancomycin 1,750 mg IV Q12H <1000>  <2200>  Trough never collected by lab     (#6)      <0930> Please hold the following dose if level is >20 mcg/mL     (#7)            Estimated Creatinine Clearance: 136 mL/min (based on SCr of 0.7 mg/dL). UOP over the past 24 hours:       Intake/Output Summary (Last 24 hours) at 2020 0829  Last data filed at 2020 0402  Gross per 24 hour   Intake 1770 ml   Output 1600 ml   Net 170 ml       Temp max: Temp (24hrs), Av.1 °F (36.2 °C), Min:96.5 °F (35.8 °C), Max:97.7 °F (36.5 °C)      Antibiotic Regimen:  Antibiotic Dose Date Initiated   -- -- --     Cultures:  available culture and sensitivity results were reviewed in EPIC  Cultures sent and are pending.   Culture Date Result    Blood cx #1  Gram positive rods; Diphtheroid-like    Blood cx #2  Gram positive rods; Diphtheroid-like    Respiratory Panel  Covid-19 positive   Urine cx  <10,000 CFU/mL   Mixed gram positive organisms    Legionella Antigen  Negative     Assessment:  · Consulted by Dr. Levine to dose/monitor vancomycin  · Goal trough level:  15-20 mcg/mL  · Pt is a 49 y/o M who has been admitted for several days due to covid-19 pneumonia  · Serum creatinine on 12/4: 0.7; CrCl > 120 mL/min; baseline Scr ~ unknown  · 12/3: Trough today @ 0211 = 10.3 mcg/mL    Plan:  · Continue Vancomycin 1,750 mg IV Q12H  · Trough never drawn by lab and following dose held; reschedule trough for tomorrow @ 0930  · Follow renal function  · Pharmacist will follow and monitor/adjust dosing as necessary      Thank you for the consult,    Mario Patterson, PharmD 12/5/2020 8:34 AM   [phone]

## 2020-12-06 LAB
ANION GAP SERPL CALCULATED.3IONS-SCNC: 9 MMOL/L (ref 7–16)
BLOOD CULTURE, ROUTINE: ABNORMAL
BLOOD CULTURE, ROUTINE: ABNORMAL
BUN BLDV-MCNC: 25 MG/DL (ref 6–20)
CALCIUM SERPL-MCNC: 8.8 MG/DL (ref 8.6–10.2)
CHLORIDE BLD-SCNC: 93 MMOL/L (ref 98–107)
CO2: 26 MMOL/L (ref 22–29)
CREAT SERPL-MCNC: 0.7 MG/DL (ref 0.7–1.2)
GFR AFRICAN AMERICAN: >60
GFR NON-AFRICAN AMERICAN: >60 ML/MIN/1.73
GLUCOSE BLD-MCNC: 458 MG/DL (ref 74–99)
METER GLUCOSE: 323 MG/DL (ref 74–99)
METER GLUCOSE: 332 MG/DL (ref 74–99)
METER GLUCOSE: 343 MG/DL (ref 74–99)
METER GLUCOSE: 460 MG/DL (ref 74–99)
ORGANISM: ABNORMAL
POTASSIUM SERPL-SCNC: 4.6 MMOL/L (ref 3.5–5)
SODIUM BLD-SCNC: 128 MMOL/L (ref 132–146)
TROPONIN: <0.01 NG/ML (ref 0–0.03)

## 2020-12-06 PROCEDURE — 82962 GLUCOSE BLOOD TEST: CPT

## 2020-12-06 PROCEDURE — 6360000002 HC RX W HCPCS: Performed by: INTERNAL MEDICINE

## 2020-12-06 PROCEDURE — 6370000000 HC RX 637 (ALT 250 FOR IP): Performed by: NURSE PRACTITIONER

## 2020-12-06 PROCEDURE — 2580000003 HC RX 258: Performed by: INTERNAL MEDICINE

## 2020-12-06 PROCEDURE — 6370000000 HC RX 637 (ALT 250 FOR IP): Performed by: INTERNAL MEDICINE

## 2020-12-06 PROCEDURE — 94640 AIRWAY INHALATION TREATMENT: CPT

## 2020-12-06 PROCEDURE — 2700000000 HC OXYGEN THERAPY PER DAY

## 2020-12-06 PROCEDURE — 2060000000 HC ICU INTERMEDIATE R&B

## 2020-12-06 PROCEDURE — 6360000002 HC RX W HCPCS: Performed by: NURSE PRACTITIONER

## 2020-12-06 PROCEDURE — 99232 SBSQ HOSP IP/OBS MODERATE 35: CPT | Performed by: INTERNAL MEDICINE

## 2020-12-06 PROCEDURE — 2580000003 HC RX 258: Performed by: STUDENT IN AN ORGANIZED HEALTH CARE EDUCATION/TRAINING PROGRAM

## 2020-12-06 RX ORDER — INSULIN GLARGINE 100 [IU]/ML
40 INJECTION, SOLUTION SUBCUTANEOUS NIGHTLY
Status: DISCONTINUED | OUTPATIENT
Start: 2020-12-06 | End: 2020-12-09 | Stop reason: HOSPADM

## 2020-12-06 RX ORDER — INSULIN GLARGINE 100 [IU]/ML
10 INJECTION, SOLUTION SUBCUTANEOUS ONCE
Status: COMPLETED | OUTPATIENT
Start: 2020-12-06 | End: 2020-12-06

## 2020-12-06 RX ADMIN — DOCUSATE SODIUM 100 MG: 100 CAPSULE, LIQUID FILLED ORAL at 21:52

## 2020-12-06 RX ADMIN — ENOXAPARIN SODIUM 100 MG: 100 INJECTION SUBCUTANEOUS at 21:53

## 2020-12-06 RX ADMIN — Medication 500 MG: at 21:52

## 2020-12-06 RX ADMIN — DILTIAZEM HYDROCHLORIDE 60 MG: 30 TABLET, FILM COATED ORAL at 21:52

## 2020-12-06 RX ADMIN — Medication 10 ML: at 10:10

## 2020-12-06 RX ADMIN — DIGOXIN 250 MCG: 250 TABLET ORAL at 09:38

## 2020-12-06 RX ADMIN — ASPIRIN 81 MG CHEWABLE TABLET 81 MG: 81 TABLET CHEWABLE at 09:37

## 2020-12-06 RX ADMIN — INSULIN LISPRO 12 UNITS: 100 INJECTION, SOLUTION INTRAVENOUS; SUBCUTANEOUS at 16:45

## 2020-12-06 RX ADMIN — VANCOMYCIN HYDROCHLORIDE 1750 MG: 10 INJECTION, POWDER, LYOPHILIZED, FOR SOLUTION INTRAVENOUS at 15:13

## 2020-12-06 RX ADMIN — INSULIN GLARGINE 40 UNITS: 100 INJECTION, SOLUTION SUBCUTANEOUS at 21:52

## 2020-12-06 RX ADMIN — BUDESONIDE AND FORMOTEROL FUMARATE DIHYDRATE 2 PUFF: 80; 4.5 AEROSOL RESPIRATORY (INHALATION) at 20:40

## 2020-12-06 RX ADMIN — INSULIN LISPRO 8 UNITS: 100 INJECTION, SOLUTION INTRAVENOUS; SUBCUTANEOUS at 10:09

## 2020-12-06 RX ADMIN — DOCUSATE SODIUM 100 MG: 100 CAPSULE, LIQUID FILLED ORAL at 09:38

## 2020-12-06 RX ADMIN — INSULIN LISPRO 12 UNITS: 100 INJECTION, SOLUTION INTRAVENOUS; SUBCUTANEOUS at 10:09

## 2020-12-06 RX ADMIN — Medication 400 UNITS: at 09:37

## 2020-12-06 RX ADMIN — ZINC SULFATE 220 MG (50 MG) CAPSULE 50 MG: CAPSULE at 09:38

## 2020-12-06 RX ADMIN — INSULIN GLARGINE 10 UNITS: 100 INJECTION, SOLUTION SUBCUTANEOUS at 12:52

## 2020-12-06 RX ADMIN — INSULIN HUMAN 10 UNITS: 100 INJECTION, SOLUTION PARENTERAL at 12:52

## 2020-12-06 RX ADMIN — INSULIN LISPRO 6 UNITS: 100 INJECTION, SOLUTION INTRAVENOUS; SUBCUTANEOUS at 21:52

## 2020-12-06 RX ADMIN — Medication 100 MG: at 09:38

## 2020-12-06 RX ADMIN — OXYCODONE HYDROCHLORIDE AND ACETAMINOPHEN 1000 MG: 500 TABLET ORAL at 09:38

## 2020-12-06 RX ADMIN — INSULIN LISPRO 18 UNITS: 100 INJECTION, SOLUTION INTRAVENOUS; SUBCUTANEOUS at 12:54

## 2020-12-06 RX ADMIN — DEXAMETHASONE SODIUM PHOSPHATE 6 MG: 10 INJECTION, SOLUTION INTRAMUSCULAR; INTRAVENOUS at 06:54

## 2020-12-06 RX ADMIN — INSULIN LISPRO 12 UNITS: 100 INJECTION, SOLUTION INTRAVENOUS; SUBCUTANEOUS at 12:00

## 2020-12-06 ASSESSMENT — PAIN SCALES - GENERAL
PAINLEVEL_OUTOF10: 0
PAINLEVEL_OUTOF10: 0

## 2020-12-06 NOTE — PROGRESS NOTES
Primary Care Physician: Nikki Ramirez MD   Admitting Physician:  Bridgette Haddad DO  Admission date and time: 11/28/2020  8:25 PM    Room:  [address] [place] [address]  Admitting diagnosis: COVID-19 [U07.1]    Patient Name: Alma Workman  MRN: [id number]    Date of Service: 12/6/2020       Subjective:    Andre Harrington is a 48 y.o. male who was seen and examined today,12/6/2020, at the bedside. Patient's breathing seems to be getting little bit better. Patient converted to sinus rhythm yesterday afternoon. Patient patient denies lightheadedness or dizziness. Respiratory wise the patient denies any shortness of breath. Patient still on 2 L nasal cannula with O2 sat 93% at rest.  Blood sugars ranging 323-458. Patient seems doing a lot better overall today. Review of System:   Constitutional:   Denies fever or chills, weight loss or gain, Admits to fatigue. HEENT:   Denies ear pain, sore throat, sinus or eye problems. Cardiovascular:   Patient is in atrial fibrillation with RVR. Denies lightheadedness or dizziness. Asymptomatic. Respiratory:   Denies shortness of breath at rest or with exertion. Intermittent coughing without sputum. Gastrointestinal:   Denies nausea, vomiting, diarrhea, admits to constipation. Denies any abdominal pain. Genitourinary:    Denies any urgency, frequency, hematuria. Voiding  without difficulty. Extremities:   Denies lower extremity swelling, edema or cyanosis. Neurology:    Denies any headache or focal neurological deficits, Denies generalized weakness or memory difficulty. Psch:   Denies being anxious or depressed. Musculoskeletal:    Denies  myalgias, joint complaints or back pain. Integumentary:   Denies any rashes, ulcers, or excoriations. Denies bruising. Hematologic/Lymphatic:  Denies bruising or bleeding. Physical Exam:  No intake/output data recorded.     Intake/Output Summary (Last 24 hours) at 12/6/2020 1138  Last data filed at 12/6/2020 0645  Gross per 24 hour   Intake sodium  100 mg Oral BID    aspirin  81 mg Oral Daily    insulin lispro  0-18 Units Subcutaneous TID     insulin lispro  0-9 Units Subcutaneous Nightly    vitamin B-1  100 mg Oral Daily    vitamin C  1,000 mg Oral Daily    vitamin E  400 Units Oral Daily    zinc sulfate  50 mg Oral Daily    niacin  500 mg Oral Nightly    dexamethasone  6 mg Intravenous Q24H    sodium chloride flush  10 mL Intravenous 2 times per day    Dulaglutide  4.5 mg Subcutaneous Weekly    budesonide-formoterol  2 puff Inhalation BID     Continuous Infusions:   dilTIAZem (CARDIZEM) 125 mg in dextrose 5% 125 mL infusion Stopped (12/04/20 0432)    dextrose         Objective Data:  CBC with Differential:    Lab Results   Component Value Date    WBC 7.1 12/04/2020    RBC 4.79 12/04/2020    HGB 14.1 12/04/2020    HCT 41.5 12/04/2020     12/04/2020    MCV 86.6 12/04/2020    MCH 29.4 12/04/2020    MCHC 34.0 12/04/2020    RDW 12.2 12/04/2020    NRBC 1.0 12/04/2020    METASPCT 2.0 12/04/2020    LYMPHOPCT 6.0 12/04/2020    MONOPCT 4.0 12/04/2020    MYELOPCT 1.0 12/02/2020    BASOPCT 0.0 12/04/2020    MONOSABS 0.28 12/04/2020    LYMPHSABS 0.43 12/04/2020    EOSABS 0.00 12/04/2020    BASOSABS 0.00 12/04/2020     BMP:    Lab Results   Component Value Date     12/05/2020    K 4.6 12/05/2020    K 4.6 12/04/2020    CL 93 12/05/2020    CO2 26 12/05/2020    BUN 25 12/05/2020    LABALBU 2.6 12/04/2020    CREATININE 0.7 12/05/2020    CALCIUM 8.8 12/05/2020    GFRAA >60 12/05/2020    LABGLOM >60 12/05/2020    GLUCOSE 458 12/05/2020       Wound Documentation:    see documentation     Assessment:    · Acute respiratory failure with hypoxia secondary to COVID-19 infection  · Poorly controlled diabetes mellitus type 2 without long-term use of insulin, hemoglobin A1c 10.8%  · EKG changes, nonischemic  · New onset atrial fibrillation with rapid ventricular response-heart rate currently controlled and converted back to sinus rhythm management plans with the patient and individuals accompanying the patient. Maricel Pierce requires this high level of physician care and nursing on the IMC/Telemetry unit due the complexity of decision management and chance of rapid decline or death. Continued cardiac monitoring and higher level of nursing are required. I am readily available for any further decision-making and intervention.        Angel Cintron D.O., FACOI  11:38 AM  12/6/2020

## 2020-12-06 NOTE — PROGRESS NOTES
[facility] Cardiology Inpatient Progress Note    Patient is a 48 y.o. male of Nanci Dumont MD seen in hospital follow up. Chief complaint: PAfib/COVID 19     HPI: Some SOB. No CP.     Patient Active Problem List   Diagnosis    COVID-19    Diabetes mellitus ([facility] [address]    Atrial fibrillation with RVR ([facility] [address]       No Known Allergies    Current Facility-Administered Medications   Medication Dose Route Frequency Provider Last Rate Last Dose    dilTIAZem (CARDIZEM) tablet 60 mg  60 mg Oral 3 times per day Jane Gomez DO   Stopped at 12/06/20 0709    vancomycin (VANCOCIN) 1,750 mg in dextrose 5 % 500 mL IVPB  1,750 mg Intravenous Q12H Quincy Lefort, DO   Stopped at 12/06/20 0010    insulin lispro (HUMALOG) injection vial 8 Units  8 Units Subcutaneous TID WC Lenora Bella, DO   8 Units at 12/05/20 1720    insulin glargine (LANTUS) injection vial 30 Units  30 Units Subcutaneous Nightly Lenorasa Bella, DO   30 Units at 12/05/20 2209    digoxin (LANOXIN) tablet 250 mcg  250 mcg Oral Daily Panda Romano MD   250 mcg at 12/05/20 1743    vitamin D (ERGOCALCIFEROL) capsule 50,000 Units  50,000 Units Oral Weekly Francisco Santos DO   50,000 Units at 12/05/20 0138    enoxaparin (LOVENOX) injection 100 mg  1 mg/kg Subcutaneous BID RUSH Valdez CNP   100 mg at 12/05/20 2150    dilTIAZem 125 mg in dextrose 5 % 125 mL infusion  5 mg/hr Intravenous Continuous RUSH Valdez CNP   Stopped at 12/04/20 0432    polyethylene glycol (GLYCOLAX) packet 17 g  17 g Oral Daily RUSH Elizabeth CNP   Stopped at 12/03/20 1158    docusate sodium (COLACE) capsule 100 mg  100 mg Oral BID RUSH Elizabeth CNP   100 mg at 12/05/20 2149    perflutren lipid microspheres (DEFINITY) injection 1.65 mg  1.5 mL Intravenous ONCE PRN RUSH Elizabeth CNP        aspirin chewable tablet 81 mg  81 mg Oral Daily RUSH Elizabeth CNP   81 mg at 12/04/20 1013    insulin lispro (HUMALOG) injection vial 0-18 Units  0-18 Units Subcutaneous TID WC Larence Olsen, APRN - CNP   18 Units at 12/05/20 1720    insulin lispro (HUMALOG) injection vial 0-9 Units  0-9 Units Subcutaneous Nightly Larence Olsen, APRN - CNP   6 Units at 12/05/20 2210    vitamin B-1 (THIAMINE) tablet 100 mg  100 mg Oral Daily Larence Olsen, APRN - CNP   100 mg at 12/04/20 1014    vitamin C (ASCORBIC ACID) tablet 1,000 mg  1,000 mg Oral Daily Larence Olsen, APRN - CNP   1,000 mg at 12/04/20 1014    vitamin E capsule 400 Units  400 Units Oral Daily Larence Olsen, APRN - CNP   400 Units at 12/04/20 1014    zinc sulfate (ZINCATE) capsule 50 mg  50 mg Oral Daily Larence Olsen, APRN - CNP   50 mg at 12/04/20 1014    glucose (GLUTOSE) 40 % oral gel 15 g  15 g Oral PRN Larence Olsen, APRN - CNP        dextrose 50 % IV solution  12.5 g Intravenous PRN Larence Olsen, APRN - CNP        glucagon (rDNA) injection 1 mg  1 mg Intramuscular PRN Larence Olsen, APRN - CNP        dextrose 5 % solution  100 mL/hr Intravenous PRN Larence Olsen, APRN - CNP        acetaminophen (TYLENOL) tablet 650 mg  650 mg Oral Q6H PRN Larence Olsen, APRN - CNP        Or    acetaminophen (TYLENOL) suppository 650 mg  650 mg Rectal Q6H PRN Larence Olsen, APRN - CNP        guaiFENesin-dextromethorphan (ROBITUSSIN DM) 100-10 MG/5ML syrup 5 mL  5 mL Oral Q4H PRN Larence Olsen, APRN - CNP        niacin (SLO-NIACIN) extended release tablet 500 mg  500 mg Oral Nightly Larence Olsen, APRN - CNP   500 mg at 12/05/20 2149    dexamethasone (PF) (DECADRON) injection 6 mg  6 mg Intravenous Q24H Regina Kvoacs DO   6 mg at 12/06/20 0654    0.9 % sodium chloride bolus  30 mL Intravenous PRN Regina Kovacs DO        sodium chloride flush 0.9 % injection 10 mL  10 mL Intravenous 2 times per day [facility], DO   10 mL at 12/05/20 2151    sodium chloride flush 0.9 % injection 10 mL  10 mL Intravenous PRN Artem Merino,         Dulaglutide SOPN 4.5 mg  4.5 mg Subcutaneous Weekly Diagnosis    COVID-19    Diabetes mellitus ([facility]    Atrial fibrillation with RVR ([facility]     1. Afib: In sinus. CHADS VASc 1.    Echo okay. Rate control with cardizem and digoxin. On lovenox. 2. COVID-19: Per primary service. 3. Abnormal EKG: Outpatient stress. 4. DM     Modesta Serrano D.O.   Cardiologist  Cardiology, [facility]

## 2020-12-06 NOTE — PROGRESS NOTES
Pharmacy Consultation Note  (Antibiotic Dosing and Monitoring)    Initial consult date:   Consulting physician: Dr Cody Tony  Drug(s): Vancomycin IV  Indication: Positive blood culture, pneumonia    Ht Readings from Last 1 Encounters:   20 6' 1\" (1.854 m)     Wt Readings from Last 1 Encounters:   20 187 lb (84.8 kg)       Age/  Gender Actual BW IBW DW  Allergy Information   48 y.o.     male 95.3 kg 79.9 kg 86.1 kg  Patient has no known allergies. Date  WBC BUN/CR UOP (mL/kg/hr) Drug/Dose Time   Given Level(s)   (Time) Comments     (#1) 6.5 26/0.9 -- Vancomycin 1,500 mg IV Q12H 1550       (#2) 6.6 22/0.7 -- Vancomycin 1,500 mg IV Q12H 0302  1458     12/3  (#3) -- -- -- Vancomycin 1,500 mg IV Q12H  Vancomycin 1,750 mg IV Q12H 0318  1634 Trough @ 0211 = 10.3 mcg/mL Please hold the following dose if level is >20 mcg/mL     (#4) 7.1 24/0.7 1.8 Vancomycin 1,750 mg IV Q12H 0232  1455       (#5) -- -- 0.79 Vancomycin 1,750 mg IV Q12H 1310  2202  Trough never collected by lab     (#6) -- 25/0.7 1.2 Vancomycin 1,750 mg IV Q12H <1330>  Please hold the following dose if level is >20 mcg/mL     (#7)     <0130> <0100> Please hold the following dose if level is >20 mcg/mL     Estimated Creatinine Clearance: 136 mL/min (based on SCr of 0.7 mg/dL). UOP over the past 24 hours:       Intake/Output Summary (Last 24 hours) at 2020 1302  Last data filed at 2020 0645  Gross per 24 hour   Intake 1240 ml   Output 2350 ml   Net -1110 ml       Temp max: Temp (24hrs), Av.5 °F (36.4 °C), Min:97.3 °F (36.3 °C), Max:97.7 °F (36.5 °C)      Antibiotic Regimen:  Antibiotic Dose Date Initiated   -- -- --     Cultures:  available culture and sensitivity results were reviewed in EPIC  Cultures sent and are pending.   Culture Date Result    Blood cx #1  Gram positive rods; Diphtheroid-like    Blood cx #2  Gram positive rods; Diphtheroid-like    Respiratory Panel  Covid-19 positive   Urine cx 11/28 <10,000 CFU/mL   Mixed gram positive organisms    Legionella Antigen 11/30 Negative     Assessment:  · Consulted by Dr. Lisa Hopson to dose/monitor vancomycin  · Goal trough level:  15-20 mcg/mL  · Pt is a 47 y/o M who has been admitted for several days due to covid-19 pneumonia  · Serum creatinine today: 0.7; CrCl > 120 mL/min; baseline Scr ~ unknown  · 12/3: Trough today @ 0211 = 10.3 mcg/mL  · 12/6: 0930 not collected, will re-time regimen & trough    Plan:  · Continue Vancomycin 1,750 mg IV Q12H  · Trough never drawn by lab; reschedule trough for tomorrow @ 0100, hold dose if trough is >20 mcg/mL  · Follow renal function  · Pharmacist will follow and monitor/adjust dosing as necessary      Thank you for the consult,    Ghazal Mandel, PharmD, BCPS 12/6/2020 1:07 PM

## 2020-12-07 LAB
ACANTHOCYTES: ABNORMAL
ALBUMIN SERPL-MCNC: 2.7 G/DL (ref 3.5–5.2)
ALP BLD-CCNC: 98 U/L (ref 40–129)
ALT SERPL-CCNC: 74 U/L (ref 0–40)
ANION GAP SERPL CALCULATED.3IONS-SCNC: 8 MMOL/L (ref 7–16)
APTT: 29.4 SEC (ref 24.5–35.1)
AST SERPL-CCNC: 64 U/L (ref 0–39)
BASOPHILS ABSOLUTE: 0 E9/L (ref 0–0.2)
BASOPHILS RELATIVE PERCENT: 0.1 % (ref 0–2)
BILIRUB SERPL-MCNC: 0.4 MG/DL (ref 0–1.2)
BUN BLDV-MCNC: 20 MG/DL (ref 6–20)
BURR CELLS: ABNORMAL
CALCIUM SERPL-MCNC: 8.5 MG/DL (ref 8.6–10.2)
CHLORIDE BLD-SCNC: 93 MMOL/L (ref 98–107)
CO2: 30 MMOL/L (ref 22–29)
CREAT SERPL-MCNC: 0.7 MG/DL (ref 0.7–1.2)
D DIMER: <200 NG/ML DDU
EOSINOPHILS ABSOLUTE: 0 E9/L (ref 0.05–0.5)
EOSINOPHILS RELATIVE PERCENT: 0.3 % (ref 0–6)
FIBRINOGEN: 493 MG/DL (ref 225–540)
GFR AFRICAN AMERICAN: >60
GFR NON-AFRICAN AMERICAN: >60 ML/MIN/1.73
GLUCOSE BLD-MCNC: 365 MG/DL (ref 74–99)
HCT VFR BLD CALC: 39.9 % (ref 37–54)
HEMOGLOBIN: 13.4 G/DL (ref 12.5–16.5)
INR BLD: 1
LYMPHOCYTES ABSOLUTE: 0.64 E9/L (ref 1.5–4)
LYMPHOCYTES RELATIVE PERCENT: 7.8 % (ref 20–42)
MCH RBC QN AUTO: 29.6 PG (ref 26–35)
MCHC RBC AUTO-ENTMCNC: 33.6 % (ref 32–34.5)
MCV RBC AUTO: 88.3 FL (ref 80–99.9)
METAMYELOCYTES RELATIVE PERCENT: 0.9 % (ref 0–1)
METER GLUCOSE: 334 MG/DL (ref 74–99)
METER GLUCOSE: 428 MG/DL (ref 74–99)
METER GLUCOSE: 443 MG/DL (ref 74–99)
MONOCYTES ABSOLUTE: 0.4 E9/L (ref 0.1–0.95)
MONOCYTES RELATIVE PERCENT: 5.2 % (ref 2–12)
MYELOCYTE PERCENT: 0.9 % (ref 0–0)
NEUTROPHILS ABSOLUTE: 6.96 E9/L (ref 1.8–7.3)
NEUTROPHILS RELATIVE PERCENT: 85.2 % (ref 43–80)
OVALOCYTES: ABNORMAL
PDW BLD-RTO: 12.4 FL (ref 11.5–15)
PLATELET # BLD: 387 E9/L (ref 130–450)
PMV BLD AUTO: 9.5 FL (ref 7–12)
POIKILOCYTES: ABNORMAL
POLYCHROMASIA: ABNORMAL
POTASSIUM REFLEX MAGNESIUM: 4.4 MMOL/L (ref 3.5–5)
PROTHROMBIN TIME: 11.4 SEC (ref 9.3–12.4)
RBC # BLD: 4.52 E12/L (ref 3.8–5.8)
SODIUM BLD-SCNC: 131 MMOL/L (ref 132–146)
TARGET CELLS: ABNORMAL
TEAR DROP CELLS: ABNORMAL
TOTAL PROTEIN: 6 G/DL (ref 6.4–8.3)
TROPONIN: <0.01 NG/ML (ref 0–0.03)
VACUOLATED NEUTROPHILS: ABNORMAL
VANCOMYCIN TROUGH: 14.4 MCG/ML (ref 5–16)
WBC # BLD: 8 E9/L (ref 4.5–11.5)

## 2020-12-07 PROCEDURE — 6370000000 HC RX 637 (ALT 250 FOR IP): Performed by: INTERNAL MEDICINE

## 2020-12-07 PROCEDURE — 85730 THROMBOPLASTIN TIME PARTIAL: CPT

## 2020-12-07 PROCEDURE — 82962 GLUCOSE BLOOD TEST: CPT

## 2020-12-07 PROCEDURE — 94667 MNPJ CHEST WALL 1ST: CPT

## 2020-12-07 PROCEDURE — 6360000002 HC RX W HCPCS: Performed by: INTERNAL MEDICINE

## 2020-12-07 PROCEDURE — 85025 COMPLETE CBC W/AUTO DIFF WBC: CPT

## 2020-12-07 PROCEDURE — 2060000000 HC ICU INTERMEDIATE R&B

## 2020-12-07 PROCEDURE — 80202 ASSAY OF VANCOMYCIN: CPT

## 2020-12-07 PROCEDURE — 2580000003 HC RX 258: Performed by: INTERNAL MEDICINE

## 2020-12-07 PROCEDURE — 85384 FIBRINOGEN ACTIVITY: CPT

## 2020-12-07 PROCEDURE — 6360000002 HC RX W HCPCS: Performed by: NURSE PRACTITIONER

## 2020-12-07 PROCEDURE — 2700000000 HC OXYGEN THERAPY PER DAY

## 2020-12-07 PROCEDURE — 99233 SBSQ HOSP IP/OBS HIGH 50: CPT | Performed by: INTERNAL MEDICINE

## 2020-12-07 PROCEDURE — 2580000003 HC RX 258: Performed by: STUDENT IN AN ORGANIZED HEALTH CARE EDUCATION/TRAINING PROGRAM

## 2020-12-07 PROCEDURE — 85610 PROTHROMBIN TIME: CPT

## 2020-12-07 PROCEDURE — 94640 AIRWAY INHALATION TREATMENT: CPT

## 2020-12-07 PROCEDURE — 6370000000 HC RX 637 (ALT 250 FOR IP): Performed by: NURSE PRACTITIONER

## 2020-12-07 PROCEDURE — 84484 ASSAY OF TROPONIN QUANT: CPT

## 2020-12-07 PROCEDURE — 36415 COLL VENOUS BLD VENIPUNCTURE: CPT

## 2020-12-07 PROCEDURE — 85378 FIBRIN DEGRADE SEMIQUANT: CPT

## 2020-12-07 PROCEDURE — 80053 COMPREHEN METABOLIC PANEL: CPT

## 2020-12-07 PROCEDURE — 94669 MECHANICAL CHEST WALL OSCILL: CPT

## 2020-12-07 RX ORDER — DILTIAZEM HYDROCHLORIDE 180 MG/1
180 CAPSULE, COATED, EXTENDED RELEASE ORAL DAILY
Status: DISCONTINUED | OUTPATIENT
Start: 2020-12-08 | End: 2020-12-09 | Stop reason: HOSPADM

## 2020-12-07 RX ORDER — DEXAMETHASONE 4 MG/1
4 TABLET ORAL DAILY
Status: DISCONTINUED | OUTPATIENT
Start: 2020-12-08 | End: 2020-12-09 | Stop reason: HOSPADM

## 2020-12-07 RX ORDER — DEXAMETHASONE SODIUM PHOSPHATE 4 MG/ML
4 INJECTION, SOLUTION INTRA-ARTICULAR; INTRALESIONAL; INTRAMUSCULAR; INTRAVENOUS; SOFT TISSUE EVERY 24 HOURS
Status: DISCONTINUED | OUTPATIENT
Start: 2020-12-08 | End: 2020-12-07

## 2020-12-07 RX ORDER — INSULIN GLARGINE 100 [IU]/ML
20 INJECTION, SOLUTION SUBCUTANEOUS DAILY
Status: DISCONTINUED | OUTPATIENT
Start: 2020-12-07 | End: 2020-12-09 | Stop reason: HOSPADM

## 2020-12-07 RX ADMIN — INSULIN LISPRO 9 UNITS: 100 INJECTION, SOLUTION INTRAVENOUS; SUBCUTANEOUS at 21:11

## 2020-12-07 RX ADMIN — DILTIAZEM HYDROCHLORIDE 60 MG: 30 TABLET, FILM COATED ORAL at 20:59

## 2020-12-07 RX ADMIN — INSULIN LISPRO 12 UNITS: 100 INJECTION, SOLUTION INTRAVENOUS; SUBCUTANEOUS at 10:12

## 2020-12-07 RX ADMIN — VANCOMYCIN HYDROCHLORIDE 1750 MG: 10 INJECTION, POWDER, LYOPHILIZED, FOR SOLUTION INTRAVENOUS at 16:49

## 2020-12-07 RX ADMIN — BUDESONIDE AND FORMOTEROL FUMARATE DIHYDRATE 2 PUFF: 80; 4.5 AEROSOL RESPIRATORY (INHALATION) at 17:44

## 2020-12-07 RX ADMIN — Medication 500 MG: at 20:59

## 2020-12-07 RX ADMIN — INSULIN LISPRO 18 UNITS: 100 INJECTION, SOLUTION INTRAVENOUS; SUBCUTANEOUS at 14:36

## 2020-12-07 RX ADMIN — OXYCODONE HYDROCHLORIDE AND ACETAMINOPHEN 1000 MG: 500 TABLET ORAL at 10:19

## 2020-12-07 RX ADMIN — INSULIN HUMAN 10 UNITS: 100 INJECTION, SOLUTION PARENTERAL at 14:33

## 2020-12-07 RX ADMIN — DOCUSATE SODIUM 100 MG: 100 CAPSULE, LIQUID FILLED ORAL at 21:00

## 2020-12-07 RX ADMIN — VANCOMYCIN HYDROCHLORIDE 1750 MG: 10 INJECTION, POWDER, LYOPHILIZED, FOR SOLUTION INTRAVENOUS at 01:59

## 2020-12-07 RX ADMIN — Medication 10 ML: at 20:59

## 2020-12-07 RX ADMIN — Medication 100 MG: at 10:20

## 2020-12-07 RX ADMIN — INSULIN LISPRO 16 UNITS: 100 INJECTION, SOLUTION INTRAVENOUS; SUBCUTANEOUS at 18:39

## 2020-12-07 RX ADMIN — Medication 400 UNITS: at 10:20

## 2020-12-07 RX ADMIN — DEXAMETHASONE SODIUM PHOSPHATE 6 MG: 10 INJECTION, SOLUTION INTRAMUSCULAR; INTRAVENOUS at 06:08

## 2020-12-07 RX ADMIN — ENOXAPARIN SODIUM 100 MG: 100 INJECTION SUBCUTANEOUS at 10:20

## 2020-12-07 RX ADMIN — INSULIN LISPRO 12 UNITS: 100 INJECTION, SOLUTION INTRAVENOUS; SUBCUTANEOUS at 10:14

## 2020-12-07 RX ADMIN — DILTIAZEM HYDROCHLORIDE 60 MG: 30 TABLET, FILM COATED ORAL at 06:08

## 2020-12-07 RX ADMIN — DILTIAZEM HYDROCHLORIDE 60 MG: 30 TABLET, FILM COATED ORAL at 14:47

## 2020-12-07 RX ADMIN — INSULIN GLARGINE 20 UNITS: 100 INJECTION, SOLUTION SUBCUTANEOUS at 14:38

## 2020-12-07 RX ADMIN — INSULIN GLARGINE 40 UNITS: 100 INJECTION, SOLUTION SUBCUTANEOUS at 21:11

## 2020-12-07 RX ADMIN — INSULIN LISPRO 12 UNITS: 100 INJECTION, SOLUTION INTRAVENOUS; SUBCUTANEOUS at 14:37

## 2020-12-07 RX ADMIN — ASPIRIN 81 MG CHEWABLE TABLET 81 MG: 81 TABLET CHEWABLE at 10:19

## 2020-12-07 RX ADMIN — ZINC SULFATE 220 MG (50 MG) CAPSULE 50 MG: CAPSULE at 10:19

## 2020-12-07 RX ADMIN — DIGOXIN 250 MCG: 250 TABLET ORAL at 10:19

## 2020-12-07 RX ADMIN — INSULIN LISPRO 12 UNITS: 100 INJECTION, SOLUTION INTRAVENOUS; SUBCUTANEOUS at 18:38

## 2020-12-07 NOTE — PROGRESS NOTES
Primary Care Physician: Karena Reyes MD   Admitting Physician:  Angel Cintron DO  Admission date and time: 11/28/2020  8:25 PM    Room:  [address] [place] [address]  Admitting diagnosis: COVID-19 [U07.1]    Patient Name: Gordy Dennis  MRN: [id number]    Date of Service: 12/7/2020       Subjective:    Maricel Pierce is a 48 y.o. male who was seen and examined today,12/6/2020, at the bedside. Patient's breathing seems to be getting little bit better. Patient converted to sinus rhythm yesterday afternoon. Patient patient denies lightheadedness or dizziness. Respiratory wise the patient denies any shortness of breath. Patient still on 2 L nasal cannula with O2 sat 93% at rest.  Blood sugars ranging 323-458. Patient seems doing a lot better overall today. 12/7/2020  Patient seen and examined on PICU. Patient states he feels pretty good. Patient denies any problem chest pain or abdominal pain. Patient's breathing is improved. Patient appetite is very good. BUN/creatinine 20/0.7 today with serum sodium 131. Blood sugars still markedly elevated 332-460. Temperature 96.9 with patient heart rate is 72. Blood pressure 111/68 with O2 sat 96% on room air at res on 2 L nasal cannula t.  Nursing stated they took the patient's O2 off and his O2 sats did go down to 88% at rest.  Repeat blood cultures are still negative. Review of System:   Constitutional:   Denies fever or chills, weight loss or gain, Admits to fatigue. HEENT:   Denies ear pain, sore throat, sinus or eye problems. Cardiovascular:   Patient is in atrial fibrillation with RVR. Denies lightheadedness or dizziness. Asymptomatic. Respiratory:   Denies shortness of breath at rest or with exertion. Intermittent coughing without sputum. Gastrointestinal:   Denies nausea, vomiting, diarrhea, admits to constipation. Denies any abdominal pain. Genitourinary:    Denies any urgency, frequency, hematuria. Voiding  without difficulty.   Extremities:   Denies lower extremity swelling, edema or cyanosis. Neurology:    Denies any headache or focal neurological deficits, Denies generalized weakness or memory difficulty. Psch:   Denies being anxious or depressed. Musculoskeletal:    Denies  myalgias, joint complaints or back pain. Integumentary:   Denies any rashes, ulcers, or excoriations. Denies bruising. Hematologic/Lymphatic:  Denies bruising or bleeding. Physical Exam:  No intake/output data recorded. Intake/Output Summary (Last 24 hours) at 12/7/2020 1211  Last data filed at 12/7/2020 0657  Gross per 24 hour   Intake 960 ml   Output --   Net 960 ml   I/O last 3 completed shifts: In: 1080 [P.O.:1080]  Out: -   Patient Vitals for the past 96 hrs (Last 3 readings):   Weight   12/05/20 0615 187 lb (84.8 kg)     Vital Signs:   Blood pressure 111/68, pulse 72, temperature 96.9 °F (36.1 °C), temperature source Oral, resp. rate 15, height 6' 1\" (1.854 m), weight 187 lb (84.8 kg), SpO2 96 %. General appearance:  Alert, responsive, oriented to person, place, and time. Less ill-appearing, no distress. Head:  Normocephalic. No masses, lesions or tenderness. Eyes:  PERRLA. EOMI. Sclera clear. Buccal mucosa moist.  ENT:  Ears normal. Mucosa normal.  Neck:    Supple. Trachea midline. No thyromegaly. No JVD. No bruits. Heart:    Irregularly irregular rate and rhythm, 1/6 systolic murmur. Lungs:    Symmetrical.  Diminished bilaterally. Coarse breath sounds to auscultation bilaterally. No wheezes. No rhonchi. No rales. Abdomen:   Soft. Non-tender. Non-distended. Bowel sounds positive. No organomegaly or masses. No pain on palpation. Extremities:    Peripheral pulses present. No peripheral edema. No ulcers. No cyanosis. No clubbing. Neurologic:    Alert x 3. No focal deficit. Cranial nerves grossly intact. No focal weakness. Psych:   Behavior is normal. Mood appears normal. Speech is not rapid and/or pressured.   Musculoskeletal:   Spine ROM normal. Muscular strength intact. Gait not assessed. Integumentary:  Diaphoretic and pale. No rashes  Skin normal color and texture.   Genitalia/Breast:  Deferred    Medication:  Scheduled Meds:   dilTIAZem  60 mg Oral 3 times per day    insulin lispro  12 Units Subcutaneous TID WC    insulin glargine  40 Units Subcutaneous Nightly    vancomycin  1,750 mg Intravenous Q12H    digoxin  250 mcg Oral Daily    vitamin D  50,000 Units Oral Weekly    enoxaparin  1 mg/kg Subcutaneous BID    polyethylene glycol  17 g Oral Daily    docusate sodium  100 mg Oral BID    aspirin  81 mg Oral Daily    insulin lispro  0-18 Units Subcutaneous TID WC    insulin lispro  0-9 Units Subcutaneous Nightly    vitamin B-1  100 mg Oral Daily    vitamin C  1,000 mg Oral Daily    vitamin E  400 Units Oral Daily    zinc sulfate  50 mg Oral Daily    niacin  500 mg Oral Nightly    dexamethasone  6 mg Intravenous Q24H    sodium chloride flush  10 mL Intravenous 2 times per day    Dulaglutide  4.5 mg Subcutaneous Weekly    budesonide-formoterol  2 puff Inhalation BID     Continuous Infusions:   dilTIAZem (CARDIZEM) 125 mg in dextrose 5% 125 mL infusion Stopped (12/04/20 0432)    dextrose         Objective Data:  CBC with Differential:    Lab Results   Component Value Date    WBC 8.0 12/07/2020    RBC 4.52 12/07/2020    HGB 13.4 12/07/2020    HCT 39.9 12/07/2020     12/07/2020    MCV 88.3 12/07/2020    MCH 29.6 12/07/2020    MCHC 33.6 12/07/2020    RDW 12.4 12/07/2020    NRBC 1.0 12/04/2020    METASPCT 0.9 12/07/2020    LYMPHOPCT 7.8 12/07/2020    MONOPCT 5.2 12/07/2020    MYELOPCT 0.9 12/07/2020    BASOPCT 0.1 12/07/2020    MONOSABS 0.40 12/07/2020    LYMPHSABS 0.64 12/07/2020    EOSABS 0.00 12/07/2020    BASOSABS 0.00 12/07/2020     BMP:    Lab Results   Component Value Date     12/07/2020    K 4.4 12/07/2020    CL 93 12/07/2020    CO2 30 12/07/2020    BUN 20 12/07/2020    LABALBU 2.7 12/07/2020    CREATININE 0.7 12/07/2020    CALCIUM 8.5 12/07/2020    GFRAA >60 12/07/2020    LABGLOM >60 12/07/2020    GLUCOSE 365 12/07/2020       Wound Documentation:    see documentation     Assessment:    · Acute respiratory failure with hypoxia secondary to COVID-19 infection  · Poorly controlled diabetes mellitus type 2 without long-term use of insulin, hemoglobin A1c 10.8%  · EKG changes, nonischemic  · New onset atrial fibrillation with rapid ventricular response-heart rate currently controlled and converted back to sinus rhythm 12/5  · Possible contaminated blood culture on 11/28-gram-positive rods-Corynebacterium  · Vitamin D deficiency-level was 26    Plan:     · CMP and CBC in a.m. · The patient had been weaned off the Cardizem drip 12/4. Currently on 60 mg Cardizem p.o. every 8 and Lanoxin 250 mcg daily probably will require outpatient sleep study  · Repeated blood culture 5/4 possible contamination-still pending results  Supportive oxygen therapy to maintain O2 saturation above 90%  Remdesivir for 5 days(day 5 of 5 on 12/4)  Dexamethasone 6 mg daily   Increase Lantus 40 units subcu daily  Lantus 10 units subcu x1 now  Humulin R 10 units IV push x1  Increase Humalog 8 units 3 times daily with meals  Continue glucoscans 4 times daily with sliding scale insulin  Repeat inflammatory markers every day   Anticoagulation: Continue Lovenox. monitor D-dimer   Antibiotic regimen as prescribed. Activity as tolerated  Incentive spirometry as instructed. Sit up in chair as often as tolerated and prone as often as tolerated but at least every 2 hours. Continue current respiratory treatments. Maximum COVID protocol is being undertaken  We will continue to address underlying comorbidities during the hospitalization. Continue current therapy. See orders for further plan of care. O2 saturation on room air and if saturation greater than 89% will increase with activity.   If O2 sat greater than 90% on room air with activity will discharge home and follow-up primary care physician. Consult  for discharge planning home soon        30 minutes of critical care time was spent with the patient. This includes chart review, , and discussion with those consultants involved in the patient's care. More than 50% of my  time was spent at the bedside counseling/coordinating care with the patient and/or family with face to face contact. This time was spent reviewing notes and laboratory data as well as instructing and counseling the patient. Time I spent with the family or surrogate(s) is included only if the patient was incapable of providing the necessary information or participating in medical decisions. I also discussed the differential diagnosis and all of the proposed management plans with the patient and individuals accompanying the patient. Olivia Byrnes requires this high level of physician care and nursing on the IMC/Telemetry unit due the complexity of decision management and chance of rapid decline or death. Continued cardiac monitoring and higher level of nursing are required. I am readily available for any further decision-making and intervention.        Rashida Steinberg D.O., FACOI  12:11 PM  12/7/2020

## 2020-12-07 NOTE — CARE COORDINATION
12-7-Cm note: pt remains on 2l nc , will choice for home oxygen companies, pt will need a walking pulse ox test to qualify him for home oxygen. CM/SS will follow, plan is home at [place] .  Electronically signed by Christine Arroyo RN on 12/7/2020 at 3:50 PM

## 2020-12-07 NOTE — PROGRESS NOTES
Panel 11/28 Covid-19 positive   Urine cx 11/28 <10,000 CFU/mL   Mixed gram positive organisms    Legionella Antigen 11/30 Negative   Blood cx #3 12/4 NGTD   Blood cx #4 12/4 NGTD     Assessment:  · Consulted by Dr. Lisa Ludwig to dose/monitor vancomycin  · Goal trough level:  15-20 mcg/mL  · Pt is a 47 y/o M who has been admitted for several days due to covid-19 pneumonia  · Serum creatinine today: 0.7; CrCl > 120 mL/min; baseline Scr ~ unknown  · 12/3: Trough today @ 0211 = 10.3 mcg/mL  · 12/5: 0230 not collected, will re-time regimen & trough  · 12/6: 0930 not collected, will re-time regimen & trough  · 12/7: Trough @ 0106 = 14.4 mcg/mL    Plan:  · Continue Vancomycin 1,750 mg IV Q12H  · Due to delayed doses after two missed trough levels, the current trough may not reflect actual trough but should be close; continue to monitor  · Follow renal function  · Pharmacist will follow and monitor/adjust dosing as necessary      Thank you for the consult,    Reena Radford, PharmD 12/7/2020 11:18 AM   [phone]

## 2020-12-07 NOTE — PROGRESS NOTES
INPATIENT CARDIOLOGY FOLLOW-UP    Name: Andrea Munroe    Age: 48 y.o. Date of Admission: 11/28/2020  8:25 PM    Date of Service: 12/7/2020    Primary Cardiologist: Known to Dr Viktor Holloway from this admission    Chief Complaint: Follow-up for     Interim History:  No new overnight cardiac complaints. Currently with no complaints of CP, SOB, palpitations, dizziness, or lightheadedness. SR on telemetry.     Review of Systems:   Negative except as described above    Problem List:  Patient Active Problem List   Diagnosis    COVID-19    Diabetes mellitus ([facility]    Atrial fibrillation with RVR ([facility]       Current Medications:    Current Facility-Administered Medications:     [START ON 12/8/2020] dexamethasone (DECADRON) tablet 4 mg, 4 mg, Oral, Daily, Thalia Arias DO    insulin glargine (LANTUS) injection vial 20 Units, 20 Units, Subcutaneous, Daily, Thalia Arias DO, 20 Units at 12/07/20 1438    insulin lispro (HUMALOG) injection vial 16 Units, 16 Units, Subcutaneous, TID WC, Thalia Arias DO    [START ON 12/8/2020] dilTIAZem (CARDIZEM CD) extended release capsule 180 mg, 180 mg, Oral, Daily, Ton Justin MD    dilTIAZem (CARDIZEM) tablet 60 mg, 60 mg, Oral, 3 times per day, Ton Justin MD, 60 mg at 12/07/20 1447    insulin glargine (LANTUS) injection vial 40 Units, 40 Units, Subcutaneous, Nightly, Thalia Arias DO, 40 Units at 12/06/20 2152    vancomycin (VANCOCIN) 1,750 mg in dextrose 5 % 500 mL IVPB, 1,750 mg, Intravenous, Q12H, Irina Trevizo DO, Stopped at 12/07/20 0441    digoxin (LANOXIN) tablet 250 mcg, 250 mcg, Oral, Daily, Cally Diaz MD, 250 mcg at 12/07/20 1019    vitamin D (ERGOCALCIFEROL) capsule 50,000 Units, 50,000 Units, Oral, Weekly, Orin Santos DO, 50,000 Units at 12/05/20 0138    enoxaparin (LOVENOX) injection 100 mg, 1 mg/kg, Subcutaneous, BID, RUSH Painter - CNP, 100 mg at 12/07/20 1020    polyethylene glycol (GLYCOLAX) packet 17 g, 17 g, Oral, Daily, Ric Foss, APRN - CNP, Stopped at 12/03/20 1158    docusate sodium (COLACE) capsule 100 mg, 100 mg, Oral, BID, Ric Foss, APRN - CNP, 100 mg at 12/06/20 2152    perflutren lipid microspheres (DEFINITY) injection 1.65 mg, 1.5 mL, Intravenous, ONCE PRN, Ric Foss, APRN - CNP    aspirin chewable tablet 81 mg, 81 mg, Oral, Daily, Ric Foss, APRN - CNP, 81 mg at 12/07/20 1019    insulin lispro (HUMALOG) injection vial 0-18 Units, 0-18 Units, Subcutaneous, TID WC, Ric Foss, APRN - CNP, 18 Units at 12/07/20 1436    insulin lispro (HUMALOG) injection vial 0-9 Units, 0-9 Units, Subcutaneous, Nightly, Ric Foss, APRN - CNP, 6 Units at 12/06/20 2152    vitamin B-1 (THIAMINE) tablet 100 mg, 100 mg, Oral, Daily, Ric Franciscoings, APRN - CNP, 100 mg at 12/07/20 1020    vitamin C (ASCORBIC ACID) tablet 1,000 mg, 1,000 mg, Oral, Daily, Ric Foss, APRN - CNP, 1,000 mg at 12/07/20 1019    vitamin E capsule 400 Units, 400 Units, Oral, Daily, Ric Foss, APRN - CNP, 400 Units at 12/07/20 1020    zinc sulfate (ZINCATE) capsule 50 mg, 50 mg, Oral, Daily, Ric Foss, APRN - CNP, 50 mg at 12/07/20 1019    glucose (GLUTOSE) 40 % oral gel 15 g, 15 g, Oral, PRN, iRc Foss, APRN - CNP    dextrose 50 % IV solution, 12.5 g, Intravenous, PRN, Ric Foss, APRN - CNP    glucagon (rDNA) injection 1 mg, 1 mg, Intramuscular, PRN, Ric Foss, APRN - CNP    dextrose 5 % solution, 100 mL/hr, Intravenous, PRN, Ric Franciscoings, APRN - CNP    acetaminophen (TYLENOL) tablet 650 mg, 650 mg, Oral, Q6H PRN **OR** acetaminophen (TYLENOL) suppository 650 mg, 650 mg, Rectal, Q6H PRN, RUSH Bradley CNP    guaiFENesin-dextromethorphan (ROBITUSSIN DM) 100-10 MG/5ML syrup 5 mL, 5 mL, Oral, Q4H PRN, RUSH Bradley CNP    niacin (SLO-NIACIN) extended release tablet 500 mg, 500 mg, Oral, Nightly, RUSH Bradley CNP, 500 mg at 12/06/20 2690    0.9 % sodium chloride bolus, 30 mL, Intravenous, PRN, Rashida Punt, DO    sodium chloride flush 0.9 % injection 10 mL, 10 mL, Intravenous, 2 times per day, Artem Angelique, DO, 10 mL at 12/06/20 1010    sodium chloride flush 0.9 % injection 10 mL, 10 mL, Intravenous, PRN, Artem Angelique, DO    Dulaglutide SOPN 4.5 mg, 4.5 mg, Subcutaneous, Weekly, Rashida Punt, DO    budesonide-formoterol (SYMBICORT) 80-4.5 MCG/ACT inhaler 2 puff, 2 puff, Inhalation, BID, Rashida Punt, DO, 2 puff at 12/06/20 2040    albuterol sulfate  (90 Base) MCG/ACT inhaler 2 puff, 2 puff, Inhalation, Q6H PRN, Rashida Punt, DO    Physical Exam:  BP (!) 112/58   Pulse 73   Temp 97.7 °F (36.5 °C) (Temporal)   Resp 14   Ht 6' 1\" (1.854 m)   Wt 187 lb (84.8 kg)   SpO2 92%   BMI 24.67 kg/m²   Wt Readings from Last 3 Encounters:   12/05/20 187 lb (84.8 kg)     Appearance: Awake, alert, no acute respiratory distress  Skin: Intact, no rash  Head: Normocephalic, atraumatic  Eyes: EOMI, no conjunctival erythema  ENMT: No pharyngeal erythema, MMM, no rhinorrhea  Neck: Supple, no elevated JVP, no carotid bruits  Lungs: Clear to auscultation bilaterally. No wheezes, rales, or rhonchi. Cardiac: PMI nondisplaced, Regular rhythm with a normal rate, S1 & S2 normal, no murmurs  Abdomen: Soft, nontender, +bowel sounds  Extremities: Moves all extremities x 4, no lower extremity edema  Neurologic: No focal motor deficits apparent, normal mood and affect  Peripheral Pulses: Intact posterior tibial pulses bilaterally    Intake/Output:    Intake/Output Summary (Last 24 hours) at 12/7/2020 1526  Last data filed at 12/7/2020 0657  Gross per 24 hour   Intake 720 ml   Output --   Net 720 ml     No intake/output data recorded.     Laboratory Tests:  Recent Labs     12/04/20  1800 12/04/20  1938 12/05/20  2330 12/07/20  0616   *  --  128* 131*   K 4.6  --  4.6 4.4   CL 93*  --  93* 93*   CO2 29  --  26 30*   BUN 24*  --  25* 20   CREATININE 0.7  --  0.7 0.7 GLUCOSE 406* 434* 458* 365*   CALCIUM 9.1  --  8.8 8.5*     Lab Results   Component Value Date    MG 1.7 12/04/2020     Recent Labs     12/04/20  1800 12/07/20  0616   ALKPHOS 100 98   ALT 11 74*   AST 15 64*   PROT 6.4 6.0*   BILITOT 0.3 0.4   LABALBU 2.6* 2.7*     Recent Labs     12/04/20  1800 12/07/20  0616   WBC 7.1 8.0   RBC 4.79 4.52   HGB 14.1 13.4   HCT 41.5 39.9   MCV 86.6 88.3   MCH 29.4 29.6   MCHC 34.0 33.6   RDW 12.2 12.4    387   MPV 10.3 9.5     Lab Results   Component Value Date    TROPONINI <0.01 12/07/2020    TROPONINI <0.01 12/07/2020    TROPONINI <0.01 12/05/2020     Lab Results   Component Value Date    INR 1.0 12/07/2020    INR 1.2 12/02/2020    INR 1.1 12/01/2020    PROTIME 11.4 12/07/2020    PROTIME 14.0 (H) 12/02/2020    PROTIME 12.3 12/01/2020     Lab Results   Component Value Date    TSH 0.795 11/30/2020     Lab Results   Component Value Date    LABA1C 10.9 (H) 12/01/2020     No results found for: EAG  Lab Results   Component Value Date    CHOL 183 11/30/2020     Lab Results   Component Value Date    TRIG 154 (H) 11/30/2020     Lab Results   Component Value Date    HDL 34 11/30/2020     Lab Results   Component Value Date    LDLCALC 118 (H) 11/30/2020     Lab Results   Component Value Date    LABVLDL 31 11/30/2020     No results found for: CHOLHDLRATIO  No results for input(s): PROBNP in the last 72 hours. Cardiac Tests:    EKG:   EKG 12/3/2020 atrial fibrillation 99 bpm with frequent PVCs or aberrant complexes. There is clearly limb lead reversal compared to prior EKG as the axis is shifted right superior    EKG 12/1/2020 showing sinus rhythm 96 bpm with normal axis normal intervals. Nonspecific ST changes noted throughout    Telemetry: Rhythm 60s, nonsustained VT    Chest X-ray:   12/1/2020      FINDINGS:    Multifocal bilateral opacities are again seen, worse on the right, consistent    with pneumonia.  This appears slightly worse.  No obvious pleural effusion is    seen.  The heart and mediastinum are unchanged and there is no evidence of    vascular congestion.         Impression    Bilateral opacities, consistent with multifocal pneumonia, slightly worse. CTA chest 11/29/2020  Impression    1.  No evidence of pulmonary thromboembolism. 2.  Commonly reported imaging features of (COVID-19 or viral) pneumonia are    present. Other processes such as influenza pneumonia and organizing    pneumonia, as can be seen with drug toxicity and connective tissue disease,    can cause a similar imaging pattern. [facility]      Echocardiogram:   Transthoracic echo 12/2/2020 Dr Merlinda Guess interpretation   Summary   Left ventricular size is grossly normal.   Mild left ventricular concentric hypertrophy noted. Ejection fraction is measured at 61%. No evidence of left ventricular mass or thrombus noted. No regional wall motion abnormalities seen. Normal sized left atrium. Interatrial septum appears intact. No evidence of thrombus within left atrium. Mildly dilated right ventricle. No evidence of a thrombus in the right ventricle. Physiologic and/or trace mitral regurgitation is present. No evidence of mitral valve stenosis. Physiologic and/or trace tricuspid regurgitation. Regular rhythm. Stress test:      Cardiac catheterization:     ----------------------------------------------------------------------------------------------------------------------------------------------------------------  IMPRESSION:  1. Paroxysmal atrial fibrillation. Now back in sinus. [id number] Score at least 1. On therapeutic enoxaparin, short acting diltiazem and digoxin  2. COVID-19 pneumonia, now on room air  3. Abnormal EKG, nonspecific ST changes  4. Type 2 diabetes, A1c 10.9% on dulaglutide  5. Positive blood culture, suspected contaminant, corynebacterium  6.  Hyponatremia    RECOMMENDATIONS:     Transition short acting diltiazem to long-acting formulation   Discontinue

## 2020-12-08 LAB
ALBUMIN SERPL-MCNC: 2.7 G/DL (ref 3.5–5.2)
ALP BLD-CCNC: 94 U/L (ref 40–129)
ALT SERPL-CCNC: 76 U/L (ref 0–40)
ANION GAP SERPL CALCULATED.3IONS-SCNC: 8 MMOL/L (ref 7–16)
APTT: 24.9 SEC (ref 24.5–35.1)
AST SERPL-CCNC: 41 U/L (ref 0–39)
BASOPHILS ABSOLUTE: 0 E9/L (ref 0–0.2)
BASOPHILS RELATIVE PERCENT: 0.2 % (ref 0–2)
BILIRUB SERPL-MCNC: 0.3 MG/DL (ref 0–1.2)
BUN BLDV-MCNC: 22 MG/DL (ref 6–20)
BURR CELLS: ABNORMAL
CALCIUM SERPL-MCNC: 8.5 MG/DL (ref 8.6–10.2)
CHLORIDE BLD-SCNC: 93 MMOL/L (ref 98–107)
CO2: 27 MMOL/L (ref 22–29)
CREAT SERPL-MCNC: 0.7 MG/DL (ref 0.7–1.2)
CULTURE, BLOOD 2: ABNORMAL
D DIMER: <200 NG/ML DDU
EOSINOPHILS ABSOLUTE: 0.07 E9/L (ref 0.05–0.5)
EOSINOPHILS RELATIVE PERCENT: 0.9 % (ref 0–6)
FIBRINOGEN: 481 MG/DL (ref 225–540)
GFR AFRICAN AMERICAN: >60
GFR NON-AFRICAN AMERICAN: >60 ML/MIN/1.73
GLUCOSE BLD-MCNC: 341 MG/DL (ref 74–99)
HCT VFR BLD CALC: 38.6 % (ref 37–54)
HEMOGLOBIN: 12.9 G/DL (ref 12.5–16.5)
INR BLD: 1
LYMPHOCYTES ABSOLUTE: 1.15 E9/L (ref 1.5–4)
LYMPHOCYTES RELATIVE PERCENT: 13.9 % (ref 20–42)
MCH RBC QN AUTO: 29.5 PG (ref 26–35)
MCHC RBC AUTO-ENTMCNC: 33.4 % (ref 32–34.5)
MCV RBC AUTO: 88.3 FL (ref 80–99.9)
METER GLUCOSE: 195 MG/DL (ref 74–99)
METER GLUCOSE: 283 MG/DL (ref 74–99)
METER GLUCOSE: 312 MG/DL (ref 74–99)
METER GLUCOSE: 313 MG/DL (ref 74–99)
MONOCYTES ABSOLUTE: 0.33 E9/L (ref 0.1–0.95)
MONOCYTES RELATIVE PERCENT: 3.5 % (ref 2–12)
MYELOCYTE PERCENT: 1.7 % (ref 0–0)
NEUTROPHILS ABSOLUTE: 6.72 E9/L (ref 1.8–7.3)
NEUTROPHILS RELATIVE PERCENT: 80 % (ref 43–80)
NUCLEATED RED BLOOD CELLS: 0.9 /100 WBC
ORGANISM: ABNORMAL
PDW BLD-RTO: 12.5 FL (ref 11.5–15)
PLATELET # BLD: 413 E9/L (ref 130–450)
PMV BLD AUTO: 10 FL (ref 7–12)
POIKILOCYTES: ABNORMAL
POLYCHROMASIA: ABNORMAL
POTASSIUM REFLEX MAGNESIUM: 4.1 MMOL/L (ref 3.5–5)
PROTHROMBIN TIME: 10.9 SEC (ref 9.3–12.4)
RBC # BLD: 4.37 E12/L (ref 3.8–5.8)
RBC # BLD: NORMAL 10*6/UL
SODIUM BLD-SCNC: 128 MMOL/L (ref 132–146)
TOTAL PROTEIN: 5.8 G/DL (ref 6.4–8.3)
TROPONIN: <0.01 NG/ML (ref 0–0.03)
WBC # BLD: 8.2 E9/L (ref 4.5–11.5)

## 2020-12-08 PROCEDURE — 85025 COMPLETE CBC W/AUTO DIFF WBC: CPT

## 2020-12-08 PROCEDURE — 6370000000 HC RX 637 (ALT 250 FOR IP): Performed by: INTERNAL MEDICINE

## 2020-12-08 PROCEDURE — 94668 MNPJ CHEST WALL SBSQ: CPT

## 2020-12-08 PROCEDURE — 36415 COLL VENOUS BLD VENIPUNCTURE: CPT

## 2020-12-08 PROCEDURE — 6360000002 HC RX W HCPCS: Performed by: INTERNAL MEDICINE

## 2020-12-08 PROCEDURE — 85378 FIBRIN DEGRADE SEMIQUANT: CPT

## 2020-12-08 PROCEDURE — 6370000000 HC RX 637 (ALT 250 FOR IP): Performed by: NURSE PRACTITIONER

## 2020-12-08 PROCEDURE — 2060000000 HC ICU INTERMEDIATE R&B

## 2020-12-08 PROCEDURE — 85384 FIBRINOGEN ACTIVITY: CPT

## 2020-12-08 PROCEDURE — 6360000002 HC RX W HCPCS: Performed by: NURSE PRACTITIONER

## 2020-12-08 PROCEDURE — 80053 COMPREHEN METABOLIC PANEL: CPT

## 2020-12-08 PROCEDURE — 85610 PROTHROMBIN TIME: CPT

## 2020-12-08 PROCEDURE — 94640 AIRWAY INHALATION TREATMENT: CPT

## 2020-12-08 PROCEDURE — 82962 GLUCOSE BLOOD TEST: CPT

## 2020-12-08 PROCEDURE — 2580000003 HC RX 258: Performed by: INTERNAL MEDICINE

## 2020-12-08 PROCEDURE — 85730 THROMBOPLASTIN TIME PARTIAL: CPT

## 2020-12-08 PROCEDURE — 0202U NFCT DS 22 TRGT SARS-COV-2: CPT

## 2020-12-08 PROCEDURE — 84484 ASSAY OF TROPONIN QUANT: CPT

## 2020-12-08 PROCEDURE — 2580000003 HC RX 258: Performed by: STUDENT IN AN ORGANIZED HEALTH CARE EDUCATION/TRAINING PROGRAM

## 2020-12-08 RX ADMIN — INSULIN LISPRO 16 UNITS: 100 INJECTION, SOLUTION INTRAVENOUS; SUBCUTANEOUS at 13:37

## 2020-12-08 RX ADMIN — INSULIN LISPRO 12 UNITS: 100 INJECTION, SOLUTION INTRAVENOUS; SUBCUTANEOUS at 13:39

## 2020-12-08 RX ADMIN — DEXAMETHASONE 4 MG: 4 TABLET ORAL at 10:37

## 2020-12-08 RX ADMIN — Medication 100 MG: at 10:37

## 2020-12-08 RX ADMIN — INSULIN GLARGINE 20 UNITS: 100 INJECTION, SOLUTION SUBCUTANEOUS at 10:27

## 2020-12-08 RX ADMIN — ENOXAPARIN SODIUM 100 MG: 100 INJECTION SUBCUTANEOUS at 10:35

## 2020-12-08 RX ADMIN — Medication 10 ML: at 22:04

## 2020-12-08 RX ADMIN — Medication 400 UNITS: at 10:37

## 2020-12-08 RX ADMIN — INSULIN LISPRO 16 UNITS: 100 INJECTION, SOLUTION INTRAVENOUS; SUBCUTANEOUS at 10:29

## 2020-12-08 RX ADMIN — INSULIN GLARGINE 40 UNITS: 100 INJECTION, SOLUTION SUBCUTANEOUS at 21:56

## 2020-12-08 RX ADMIN — ZINC SULFATE 220 MG (50 MG) CAPSULE 50 MG: CAPSULE at 10:36

## 2020-12-08 RX ADMIN — VANCOMYCIN HYDROCHLORIDE 1750 MG: 10 INJECTION, POWDER, LYOPHILIZED, FOR SOLUTION INTRAVENOUS at 06:26

## 2020-12-08 RX ADMIN — INSULIN LISPRO 16 UNITS: 100 INJECTION, SOLUTION INTRAVENOUS; SUBCUTANEOUS at 19:17

## 2020-12-08 RX ADMIN — OXYCODONE HYDROCHLORIDE AND ACETAMINOPHEN 1000 MG: 500 TABLET ORAL at 10:35

## 2020-12-08 RX ADMIN — Medication 500 MG: at 22:03

## 2020-12-08 RX ADMIN — BUDESONIDE AND FORMOTEROL FUMARATE DIHYDRATE 2 PUFF: 80; 4.5 AEROSOL RESPIRATORY (INHALATION) at 18:59

## 2020-12-08 RX ADMIN — BUDESONIDE AND FORMOTEROL FUMARATE DIHYDRATE 2 PUFF: 80; 4.5 AEROSOL RESPIRATORY (INHALATION) at 09:04

## 2020-12-08 RX ADMIN — VANCOMYCIN HYDROCHLORIDE 1750 MG: 10 INJECTION, POWDER, LYOPHILIZED, FOR SOLUTION INTRAVENOUS at 17:53

## 2020-12-08 RX ADMIN — ASPIRIN 81 MG CHEWABLE TABLET 81 MG: 81 TABLET CHEWABLE at 10:35

## 2020-12-08 RX ADMIN — DILTIAZEM HYDROCHLORIDE 180 MG: 180 CAPSULE, COATED, EXTENDED RELEASE ORAL at 10:36

## 2020-12-08 RX ADMIN — INSULIN LISPRO 3 UNITS: 100 INJECTION, SOLUTION INTRAVENOUS; SUBCUTANEOUS at 19:16

## 2020-12-08 RX ADMIN — INSULIN LISPRO 5 UNITS: 100 INJECTION, SOLUTION INTRAVENOUS; SUBCUTANEOUS at 21:56

## 2020-12-08 RX ADMIN — Medication 10 ML: at 11:00

## 2020-12-08 RX ADMIN — INSULIN LISPRO 12 UNITS: 100 INJECTION, SOLUTION INTRAVENOUS; SUBCUTANEOUS at 10:28

## 2020-12-08 ASSESSMENT — PAIN SCALES - GENERAL
PAINLEVEL_OUTOF10: 0
PAINLEVEL_OUTOF10: 0

## 2020-12-08 NOTE — PROGRESS NOTES
Room #:   4655/4414-84  Date: 2020       Patient Name: Leticia Cunha  : 1965      MRN: [id number]   Referring Provider:  Kendra Barrios DO     Patient unavailable for physical therapy eval due to refusal. Will attempt PT evaluation at a later time. Thank you.        Chandana Orta, PT

## 2020-12-08 NOTE — PROGRESS NOTES
Pharmacy Consultation Note  (Antibiotic Dosing and Monitoring)    Initial consult date:   Consulting physician: Dr Joaquin Guerra  Drug(s): Vancomycin IV  Indication: Positive blood culture, pneumonia    Ht Readings from Last 1 Encounters:   20 6' 1\" (1.854 m)     Wt Readings from Last 1 Encounters:   20 187 lb (84.8 kg)       Age/  Gender Actual BW IBW DW  Allergy Information   48 y.o.     male 95.3 kg 79.9 kg 86.1 kg  Patient has no known allergies. Date  WBC BUN/CR UOP (mL/kg/hr) Drug/Dose Time   Given Level(s)   (Time) Comments     (#1) 6.5 26/0.9 -- Vancomycin 1,500 mg IV Q12H 1550       (#2) 6.6 22/0.7 -- Vancomycin 1,500 mg IV Q12H 0302  1458     12/3  (#3) -- -- -- Vancomycin 1,500 mg IV Q12H  Vancomycin 1,750 mg IV Q12H 0318  1634 Trough @ 0211 = 10.3 mcg/mL Please hold the following dose if level is >20 mcg/mL     (#4) 7.1 24/0.7 1.8 Vancomycin 1,750 mg IV Q12H 0232  1455       (#5) -- -- 0.79 Vancomycin 1,750 mg IV Q12H 1310  2202  Trough never collected by lab     (#6) -- 25/0.7 1.2 Vancomycin 1,750 mg IV Q12H 1513  Trough never collected by lab     (#7) 8 20/0.7 -- Vancomycin 1,750 mg IV Q12H 0159  1649 Trough @ 0106 = 14.4 mcg/mL Please hold the following dose if level is >20 mcg/mL     (#8) 8.2 22/0.7 0.65 Vancomycin 1,750 mg IV Q12H 0626  <1700>       Estimated Creatinine Clearance: 136 mL/min (based on SCr of 0.7 mg/dL). UOP over the past 24 hours:       Intake/Output Summary (Last 24 hours) at 2020 1214  Last data filed at 2020 2110  Gross per 24 hour   Intake 2100 ml   Output 1325 ml   Net 775 ml       Temp max: Temp (24hrs), Av.1 °F (36.7 °C), Min:97.7 °F (36.5 °C), Max:98.5 °F (36.9 °C)      Antibiotic Regimen:  Antibiotic Dose Date Initiated   -- -- --     Cultures:  available culture and sensitivity results were reviewed in EPIC  Cultures sent and are pending.   Culture Date Result    Blood cx #1  Beta-lactamase negative anaerobic diptheroids   Blood cx #2 11/28 Corynebacterium spp   Respiratory Panel 11/28 Covid-19 positive   Urine cx 11/28 <10,000 CFU/mL   Mixed gram positive organisms    Legionella Antigen 11/30 Negative   Blood cx #3 12/4 NGTD   Blood cx #4 12/4 NGTD     Assessment:  · Consulted by Dr. Landon Beauchamp to dose/monitor vancomycin  · Goal trough level:  15-20 mcg/mL  · Pt is a 49 y/o M who has been admitted for several days due to covid-19 pneumonia  · Serum creatinine today: 0.7; CrCl > 120 mL/min; baseline Scr ~ unknown  · 12/3: Trough today @ 0211 = 10.3 mcg/mL  · 12/5: 0230 not collected, will re-time regimen & trough  · 12/6: 0930 not collected, will re-time regimen & trough  · 12/7: Trough @ 0106 = 14.4 mcg/mL    Plan:  · Continue Vancomycin 1,750 mg IV Q12H  · Trough as needed  · Follow renal function  · Pharmacist will follow and monitor/adjust dosing as necessary      Thank you for the consult,    Calvin Stratton, PharmD 12/8/2020 12:14 PM   [phone]

## 2020-12-08 NOTE — PROGRESS NOTES
Primary Care Physician: Angle Arreola MD   Admitting Physician:  Waqas Escudero DO  Admission date and time: 11/28/2020  8:25 PM    Room:  [address] [place] [address]  Admitting diagnosis: COVID-19 [U07.1]    Patient Name: Anabel Collins  MRN: [id number]    Date of Service: 12/8/2020       Subjective:    Cindy Sims is a 48 y.o. male who was seen and examined today,12/6/2020, at the bedside. Patient's breathing seems to be getting little bit better. Patient converted to sinus rhythm yesterday afternoon. Patient patient denies lightheadedness or dizziness. Respiratory wise the patient denies any shortness of breath. Patient still on 2 L nasal cannula with O2 sat 93% at rest.  Blood sugars ranging 323-458. Patient seems doing a lot better overall today. 12/7/2020  Patient seen and examined on PICU. Patient states he feels pretty good. Patient denies any problem chest pain or abdominal pain. Patient's breathing is improved. Patient appetite is very good. BUN/creatinine 20/0.7 today with serum sodium 131. Blood sugars still markedly elevated 332-460. Temperature 96.9 with patient heart rate is 72. Blood pressure 111/68 with O2 sat 96% on room air at res on 2 L nasal cannula t.  Nursing stated they took the patient's O2 off and his O2 sats did go down to 88% at rest.  Repeat blood cultures are still negative. Serum sodium 120 with BUN/creatinine 22/0.7. Blood sugars ranging 3 3 . WBC is 8.2.    12/8/2020  Patient seen and examined on PICU. Patient is doing a lot better. Patient states that he does not need PT at this time and states that his strength is fairly good. Patient still has some desaturation on O2 with activity down to 87%. O2 sat at rest is good at about 93%. .  Temperature 98.1 heart rate of 68 with blood pressure 150/63. BUN/creatinine was 22/0.7. Serum sodium 128 most likely was pseudohyponatremia related to the blood sugars. Blood sugars are ranging 100 313-428. WBC is 8.2.   Trying to discharge patient home with some home O2 but states he would rather would like to wait another day to see if he does need home O2 with activity. Review of System:   Constitutional:   Denies fever or chills, weight loss or gain, Admits to fatigue. HEENT:   Denies ear pain, sore throat, sinus or eye problems. Cardiovascular:   Patient is in atrial fibrillation with RVR. Denies lightheadedness or dizziness. Asymptomatic. Respiratory:   Denies shortness of breath at rest or with exertion. Intermittent coughing without sputum. Gastrointestinal:   Denies nausea, vomiting, diarrhea, admits to constipation. Denies any abdominal pain. Genitourinary:    Denies any urgency, frequency, hematuria. Voiding  without difficulty. Extremities:   Denies lower extremity swelling, edema or cyanosis. Neurology:    Denies any headache or focal neurological deficits, Denies generalized weakness or memory difficulty. Psch:   Denies being anxious or depressed. Musculoskeletal:    Denies  myalgias, joint complaints or back pain. Integumentary:   Denies any rashes, ulcers, or excoriations. Denies bruising. Hematologic/Lymphatic:  Denies bruising or bleeding. Physical Exam:  No intake/output data recorded. Intake/Output Summary (Last 24 hours) at 12/8/2020 1247  Last data filed at 12/7/2020 2110  Gross per 24 hour   Intake 2100 ml   Output 1325 ml   Net 775 ml   I/O last 3 completed shifts: In: 2400 [P.O.:900; IV Piggyback:1500]  Out: 4469 [Urine:1325]  Patient Vitals for the past 96 hrs (Last 3 readings):   Weight   12/05/20 0615 187 lb (84.8 kg)     Vital Signs:   Blood pressure 115/63, pulse 68, temperature 98.1 °F (36.7 °C), temperature source Oral, resp. rate 19, height 6' 1\" (1.854 m), weight 187 lb (84.8 kg), SpO2 93 %. General appearance:  Alert, responsive, oriented to person, place, and time. Less ill-appearing, no distress. Head:  Normocephalic. No masses, lesions or tenderness. Eyes:  PERRLA. EOMI.  Sclera clear. Buccal mucosa moist.  ENT:  Ears normal. Mucosa normal.  Neck:    Supple. Trachea midline. No thyromegaly. No JVD. No bruits. Heart:    Irregularly irregular rate and rhythm, 1/6 systolic murmur. Lungs:    Symmetrical.  Diminished bilaterally. Coarse breath sounds to auscultation bilaterally. No wheezes. No rhonchi. No rales. Abdomen:   Soft. Non-tender. Non-distended. Bowel sounds positive. No organomegaly or masses. No pain on palpation. Extremities:    Peripheral pulses present. No peripheral edema. No ulcers. No cyanosis. No clubbing. Neurologic:    Alert x 3. No focal deficit. Cranial nerves grossly intact. No focal weakness. Psych:   Behavior is normal. Mood appears normal. Speech is not rapid and/or pressured. Musculoskeletal:   Spine ROM normal. Muscular strength intact. Gait not assessed. Integumentary:  Diaphoretic and pale. No rashes  Skin normal color and texture.   Genitalia/Breast:  Deferred    Medication:  Scheduled Meds:   dexamethasone  4 mg Oral Daily    insulin glargine  20 Units Subcutaneous Daily    insulin lispro  16 Units Subcutaneous TID WC    dilTIAZem  180 mg Oral Daily    insulin glargine  40 Units Subcutaneous Nightly    vancomycin  1,750 mg Intravenous Q12H    vitamin D  50,000 Units Oral Weekly    enoxaparin  1 mg/kg Subcutaneous BID    polyethylene glycol  17 g Oral Daily    docusate sodium  100 mg Oral BID    aspirin  81 mg Oral Daily    insulin lispro  0-18 Units Subcutaneous TID WC    insulin lispro  0-9 Units Subcutaneous Nightly    vitamin B-1  100 mg Oral Daily    vitamin C  1,000 mg Oral Daily    vitamin E  400 Units Oral Daily    zinc sulfate  50 mg Oral Daily    niacin  500 mg Oral Nightly    sodium chloride flush  10 mL Intravenous 2 times per day    Dulaglutide  4.5 mg Subcutaneous Weekly    budesonide-formoterol  2 puff Inhalation BID     Continuous Infusions:   dextrose         Objective Data:  CBC with Differential:    Lab Results   Component Value Date    WBC 8.2 12/08/2020    RBC 4.37 12/08/2020    HGB 12.9 12/08/2020    HCT 38.6 12/08/2020     12/08/2020    MCV 88.3 12/08/2020    MCH 29.5 12/08/2020    MCHC 33.4 12/08/2020    RDW 12.5 12/08/2020    NRBC 0.9 12/08/2020    METASPCT 0.9 12/07/2020    LYMPHOPCT 13.9 12/08/2020    MONOPCT 3.5 12/08/2020    MYELOPCT 1.7 12/08/2020    BASOPCT 0.2 12/08/2020    MONOSABS 0.33 12/08/2020    LYMPHSABS 1.15 12/08/2020    EOSABS 0.07 12/08/2020    BASOSABS 0.00 12/08/2020     BMP:    Lab Results   Component Value Date     12/08/2020    K 4.1 12/08/2020    CL 93 12/08/2020    CO2 27 12/08/2020    BUN 22 12/08/2020    LABALBU 2.7 12/08/2020    CREATININE 0.7 12/08/2020    CALCIUM 8.5 12/08/2020    GFRAA >60 12/08/2020    LABGLOM >60 12/08/2020    GLUCOSE 341 12/08/2020       Wound Documentation:    see documentation     Assessment:    · Acute respiratory failure with hypoxia secondary to COVID-19 infection  · Poorly controlled diabetes mellitus type 2 without long-term use of insulin, hemoglobin A1c 10.8%  · EKG changes, nonischemic  · New onset atrial fibrillation with rapid ventricular response-heart rate currently controlled and converted back to sinus rhythm 12/5  · Possible contaminated blood culture on 11/28-gram-positive rods-Corynebacterium  · Vitamin D deficiency-level was 26    Plan:     · CMP and CBC in a.m. · The patient had been weaned off the Cardizem drip 12/4.   Currently on 60 mg Cardizem p.o. every 8 and Lanoxin 250 mcg daily probably will require outpatient sleep study  · Repeated blood culture 5/4 possible contamination-still pending results  Supportive oxygen therapy to maintain O2 saturation above 90%  Remdesivir for 5 days(day 5 of 5 on 12/4)  Dexamethasone 6 mg daily   Increase Lantus 40 units subcu daily  Lantus 10 units subcu x1 now  Humulin R 10 units IV push x1  Increase Humalog 8 units 3 times daily with meals  Continue glucoscans 4

## 2020-12-08 NOTE — PROGRESS NOTES
Occupational Therapy  OT SESSION ATTEMPT     Date:2020  Patient Name: Eleanor Navas  MRN: [id number]  : 1965  Room: [facility]/0517-01     Attempted OT session this date:    [] unavailable due to other medical staff currently with pt   [] on hold per nursing staff   [] on hold per nursing staff secondary to lab / radiology results    [x] pt declined therapy for today. Benefits of participation in therapy reviewed with pt.    [] off unit   [] Other:     Will reattempt OT evaluation and/or treatment at a later time.     Akin Da Silva, OTR/L #[id number]

## 2020-12-08 NOTE — PROGRESS NOTES
Occupational Therapy  OT SESSION ATTEMPT     Date:2020  Patient Name: Anabel Collins  MRN: [id number]  : 1965  Room: [address] [place] [address]     Attempted OT session this date:    [] unavailable due to other medical staff currently with pt   [] on hold per nursing staff   [] on hold per nursing staff secondary to lab / radiology results    [] pt declined due to ____. Benefits of participation in therapy reviewed with pt.    [] off unit   [x] Other: Nursing asking to let patient know when therapy is coming/schedule a time patient will participate. Will reattempt OT evaluation and/or treatment at a later time.     Mirna Pappas, OTR/L #[id number]

## 2020-12-09 VITALS
RESPIRATION RATE: 20 BRPM | HEART RATE: 96 BPM | DIASTOLIC BLOOD PRESSURE: 64 MMHG | TEMPERATURE: 98.4 F | SYSTOLIC BLOOD PRESSURE: 108 MMHG | HEIGHT: 73 IN | BODY MASS INDEX: 24.78 KG/M2 | OXYGEN SATURATION: 93 % | WEIGHT: 187 LBS

## 2020-12-09 LAB
ADENOVIRUS BY PCR: NOT DETECTED
ALBUMIN SERPL-MCNC: 2.9 G/DL (ref 3.5–5.2)
ALP BLD-CCNC: 89 U/L (ref 40–129)
ALT SERPL-CCNC: 67 U/L (ref 0–40)
ANION GAP SERPL CALCULATED.3IONS-SCNC: 8 MMOL/L (ref 7–16)
APTT: 27.4 SEC (ref 24.5–35.1)
AST SERPL-CCNC: 29 U/L (ref 0–39)
BASOPHILS ABSOLUTE: 0 E9/L (ref 0–0.2)
BASOPHILS RELATIVE PERCENT: 0 % (ref 0–2)
BILIRUB SERPL-MCNC: 0.4 MG/DL (ref 0–1.2)
BORDETELLA PARAPERTUSSIS BY PCR: NOT DETECTED
BORDETELLA PERTUSSIS BY PCR: NOT DETECTED
BUN BLDV-MCNC: 22 MG/DL (ref 6–20)
CALCIUM SERPL-MCNC: 9.2 MG/DL (ref 8.6–10.2)
CHLAMYDOPHILIA PNEUMONIAE BY PCR: NOT DETECTED
CHLORIDE BLD-SCNC: 96 MMOL/L (ref 98–107)
CO2: 29 MMOL/L (ref 22–29)
CORONAVIRUS 229E BY PCR: NOT DETECTED
CORONAVIRUS HKU1 BY PCR: NOT DETECTED
CORONAVIRUS NL63 BY PCR: NOT DETECTED
CORONAVIRUS OC43 BY PCR: NOT DETECTED
CREAT SERPL-MCNC: 1 MG/DL (ref 0.7–1.2)
D DIMER: 208 NG/ML DDU
EOSINOPHILS ABSOLUTE: 0 E9/L (ref 0.05–0.5)
EOSINOPHILS RELATIVE PERCENT: 0 % (ref 0–6)
FIBRINOGEN: 582 MG/DL (ref 225–540)
GFR AFRICAN AMERICAN: >60
GFR NON-AFRICAN AMERICAN: >60 ML/MIN/1.73
GLUCOSE BLD-MCNC: 341 MG/DL (ref 74–99)
HCT VFR BLD CALC: 43 % (ref 37–54)
HEMOGLOBIN: 14.5 G/DL (ref 12.5–16.5)
HUMAN METAPNEUMOVIRUS BY PCR: NOT DETECTED
HUMAN RHINOVIRUS/ENTEROVIRUS BY PCR: NOT DETECTED
INFLUENZA A BY PCR: NOT DETECTED
INFLUENZA B BY PCR: NOT DETECTED
INR BLD: 0.9
LYMPHOCYTES ABSOLUTE: 0.73 E9/L (ref 1.5–4)
LYMPHOCYTES RELATIVE PERCENT: 9 % (ref 20–42)
MCH RBC QN AUTO: 29.4 PG (ref 26–35)
MCHC RBC AUTO-ENTMCNC: 33.7 % (ref 32–34.5)
MCV RBC AUTO: 87.2 FL (ref 80–99.9)
METAMYELOCYTES RELATIVE PERCENT: 1 % (ref 0–1)
METER GLUCOSE: 238 MG/DL (ref 74–99)
METER GLUCOSE: 261 MG/DL (ref 74–99)
MONOCYTES ABSOLUTE: 0.57 E9/L (ref 0.1–0.95)
MONOCYTES RELATIVE PERCENT: 7 % (ref 2–12)
MYCOPLASMA PNEUMONIAE BY PCR: NOT DETECTED
MYELOCYTE PERCENT: 3 % (ref 0–0)
NEUTROPHILS ABSOLUTE: 6.8 E9/L (ref 1.8–7.3)
NEUTROPHILS RELATIVE PERCENT: 80 % (ref 43–80)
PARAINFLUENZA VIRUS 1 BY PCR: NOT DETECTED
PARAINFLUENZA VIRUS 2 BY PCR: NOT DETECTED
PARAINFLUENZA VIRUS 3 BY PCR: NOT DETECTED
PARAINFLUENZA VIRUS 4 BY PCR: NOT DETECTED
PDW BLD-RTO: 12.6 FL (ref 11.5–15)
PLATELET # BLD: 374 E9/L (ref 130–450)
PMV BLD AUTO: 9.7 FL (ref 7–12)
POTASSIUM REFLEX MAGNESIUM: 4 MMOL/L (ref 3.5–5)
PROTHROMBIN TIME: 10.3 SEC (ref 9.3–12.4)
RBC # BLD: 4.93 E12/L (ref 3.8–5.8)
RESPIRATORY SYNCYTIAL VIRUS BY PCR: NOT DETECTED
SARS-COV-2, PCR: DETECTED
SODIUM BLD-SCNC: 133 MMOL/L (ref 132–146)
TOTAL PROTEIN: 6.3 G/DL (ref 6.4–8.3)
TROPONIN: <0.01 NG/ML (ref 0–0.03)
WBC # BLD: 8.1 E9/L (ref 4.5–11.5)

## 2020-12-09 PROCEDURE — 84484 ASSAY OF TROPONIN QUANT: CPT

## 2020-12-09 PROCEDURE — 6360000002 HC RX W HCPCS: Performed by: INTERNAL MEDICINE

## 2020-12-09 PROCEDURE — 80053 COMPREHEN METABOLIC PANEL: CPT

## 2020-12-09 PROCEDURE — 6370000000 HC RX 637 (ALT 250 FOR IP): Performed by: INTERNAL MEDICINE

## 2020-12-09 PROCEDURE — 85384 FIBRINOGEN ACTIVITY: CPT

## 2020-12-09 PROCEDURE — 2580000003 HC RX 258: Performed by: STUDENT IN AN ORGANIZED HEALTH CARE EDUCATION/TRAINING PROGRAM

## 2020-12-09 PROCEDURE — 85730 THROMBOPLASTIN TIME PARTIAL: CPT

## 2020-12-09 PROCEDURE — 6370000000 HC RX 637 (ALT 250 FOR IP): Performed by: NURSE PRACTITIONER

## 2020-12-09 PROCEDURE — 36415 COLL VENOUS BLD VENIPUNCTURE: CPT

## 2020-12-09 PROCEDURE — 85025 COMPLETE CBC W/AUTO DIFF WBC: CPT

## 2020-12-09 PROCEDURE — 85610 PROTHROMBIN TIME: CPT

## 2020-12-09 PROCEDURE — 85378 FIBRIN DEGRADE SEMIQUANT: CPT

## 2020-12-09 PROCEDURE — 2580000003 HC RX 258: Performed by: INTERNAL MEDICINE

## 2020-12-09 PROCEDURE — 82962 GLUCOSE BLOOD TEST: CPT

## 2020-12-09 RX ORDER — ASPIRIN 81 MG/1
81 TABLET, CHEWABLE ORAL DAILY
Qty: 30 TABLET | Refills: 3 | Status: SHIPPED | OUTPATIENT
Start: 2020-12-10

## 2020-12-09 RX ORDER — DILTIAZEM HYDROCHLORIDE 180 MG/1
180 CAPSULE, COATED, EXTENDED RELEASE ORAL DAILY
Qty: 30 CAPSULE | Refills: 3 | Status: SHIPPED | OUTPATIENT
Start: 2020-12-10 | End: 2020-12-09

## 2020-12-09 RX ORDER — ALBUTEROL SULFATE 90 UG/1
2 AEROSOL, METERED RESPIRATORY (INHALATION) EVERY 6 HOURS PRN
Qty: 1 INHALER | Refills: 1 | Status: SHIPPED | OUTPATIENT
Start: 2020-12-09

## 2020-12-09 RX ORDER — ZINC SULFATE 50(220)MG
50 CAPSULE ORAL DAILY
Qty: 30 CAPSULE | Refills: 3 | COMMUNITY
Start: 2020-12-10

## 2020-12-09 RX ORDER — LANOLIN ALCOHOL/MO/W.PET/CERES
100 CREAM (GRAM) TOPICAL DAILY
Qty: 30 TABLET | Refills: 3 | Status: SHIPPED | OUTPATIENT
Start: 2020-12-10

## 2020-12-09 RX ORDER — BUDESONIDE AND FORMOTEROL FUMARATE DIHYDRATE 80; 4.5 UG/1; UG/1
2 AEROSOL RESPIRATORY (INHALATION) 2 TIMES DAILY
Qty: 1 INHALER | Refills: 0 | Status: SHIPPED | OUTPATIENT
Start: 2020-12-09

## 2020-12-09 RX ORDER — DILTIAZEM HYDROCHLORIDE 120 MG/1
120 CAPSULE, COATED, EXTENDED RELEASE ORAL DAILY
Qty: 30 CAPSULE | Refills: 1 | Status: SHIPPED | OUTPATIENT
Start: 2020-12-10

## 2020-12-09 RX ORDER — ERGOCALCIFEROL 1.25 MG/1
50000 CAPSULE ORAL WEEKLY
Qty: 5 CAPSULE | Refills: 3 | Status: SHIPPED | OUTPATIENT
Start: 2020-12-11

## 2020-12-09 RX ADMIN — INSULIN LISPRO 16 UNITS: 100 INJECTION, SOLUTION INTRAVENOUS; SUBCUTANEOUS at 13:32

## 2020-12-09 RX ADMIN — Medication 400 UNITS: at 10:32

## 2020-12-09 RX ADMIN — OXYCODONE HYDROCHLORIDE AND ACETAMINOPHEN 1000 MG: 500 TABLET ORAL at 10:31

## 2020-12-09 RX ADMIN — DEXAMETHASONE 4 MG: 4 TABLET ORAL at 10:32

## 2020-12-09 RX ADMIN — INSULIN GLARGINE 20 UNITS: 100 INJECTION, SOLUTION SUBCUTANEOUS at 10:33

## 2020-12-09 RX ADMIN — Medication 100 MG: at 10:33

## 2020-12-09 RX ADMIN — INSULIN LISPRO 16 UNITS: 100 INJECTION, SOLUTION INTRAVENOUS; SUBCUTANEOUS at 10:33

## 2020-12-09 RX ADMIN — DILTIAZEM HYDROCHLORIDE 180 MG: 180 CAPSULE, COATED, EXTENDED RELEASE ORAL at 10:31

## 2020-12-09 RX ADMIN — VANCOMYCIN HYDROCHLORIDE 1750 MG: 10 INJECTION, POWDER, LYOPHILIZED, FOR SOLUTION INTRAVENOUS at 06:33

## 2020-12-09 RX ADMIN — Medication 10 ML: at 10:32

## 2020-12-09 RX ADMIN — ASPIRIN 81 MG CHEWABLE TABLET 81 MG: 81 TABLET CHEWABLE at 10:31

## 2020-12-09 RX ADMIN — ZINC SULFATE 220 MG (50 MG) CAPSULE 50 MG: CAPSULE at 10:31

## 2020-12-09 RX ADMIN — INSULIN LISPRO 9 UNITS: 100 INJECTION, SOLUTION INTRAVENOUS; SUBCUTANEOUS at 10:33

## 2020-12-09 RX ADMIN — INSULIN LISPRO 6 UNITS: 100 INJECTION, SOLUTION INTRAVENOUS; SUBCUTANEOUS at 13:32

## 2020-12-09 RX ADMIN — DOCUSATE SODIUM 100 MG: 100 CAPSULE, LIQUID FILLED ORAL at 10:31

## 2020-12-09 ASSESSMENT — PAIN SCALES - GENERAL
PAINLEVEL_OUTOF10: 0

## 2020-12-09 NOTE — PROGRESS NOTES
Pharmacy Consultation Note  (Antibiotic Dosing and Monitoring)    Initial consult date:   Consulting physician: Dr Joann Choudhury  Drug(s): Vancomycin IV  Indication: Positive blood culture, pneumonia    Ht Readings from Last 1 Encounters:   20 6' 1\" (1.854 m)     Wt Readings from Last 1 Encounters:   20 187 lb (84.8 kg)       Age/  Gender Actual BW IBW DW  Allergy Information   48 y.o.     male 95.3 kg 79.9 kg 86.1 kg  Patient has no known allergies. Date  WBC BUN/CR UOP (mL/kg/hr) Drug/Dose Time   Given Level(s)   (Time) Comments     (#1) 6.5 26/0.9 -- Vancomycin 1,500 mg IV Q12H 1550       (#2) 6.6 22/0.7 -- Vancomycin 1,500 mg IV Q12H 0302  1458     12/3  (#3) -- -- -- Vancomycin 1,500 mg IV Q12H  Vancomycin 1,750 mg IV Q12H 0318  1634 Trough @ 0211 = 10.3 mcg/mL      (#4) 7.1 24/0.7 1.8 Vancomycin 1,750 mg IV Q12H 0232  1455       (#5) -- -- 0.79 Vancomycin 1,750 mg IV Q12H 1310  2202  Trough never collected by lab     (#6) -- 25/0.7 1.2 Vancomycin 1,750 mg IV Q12H 1513  Trough never collected by lab     (#7) 8 20/0.7 -- Vancomycin 1,750 mg IV Q12H 0159  1649 Trough @ 0106 = 14.4 mcg/mL      (#8) 8.2 22/0.7 0.65 Vancomycin 1,750 mg IV Q12H 0626  1753       (#9) 8.1 22/1.0 -- Vancomycin 1,750 mg IV Q12H 0633  <1700> Trough @ 1630 Please hold the following dose if level is >20 mcg/mL     Estimated Creatinine Clearance: 136 mL/min (based on SCr of 0.7 mg/dL). UOP over the past 24 hours:       Intake/Output Summary (Last 24 hours) at 2020 1125  Last data filed at 2020 2233  Gross per 24 hour   Intake 1000 ml   Output --   Net 1000 ml       Temp max: Temp (24hrs), Av.1 °F (36.7 °C), Min:97.7 °F (36.5 °C), Max:98.4 °F (36.9 °C)      Antibiotic Regimen:  Antibiotic Dose Date Initiated   -- -- --     Cultures:  available culture and sensitivity results were reviewed in EPIC  Cultures sent and are pending.   Culture Date Result    Blood cx #1  Beta-lactamase negative anaerobic diptheroids   Blood cx #2 11/28 Corynebacterium spp   Respiratory Panel 11/28 Covid-19 positive   Urine cx 11/28 <10,000 CFU/mL   Mixed gram positive organisms    Legionella Antigen 11/30 Negative   Blood cx #3 12/4 NGTD   Blood cx #4 12/4 NGTD   Respiratory Panel 12/8 Covid-19 positive     Assessment:  · Consulted by Dr. Dana Gerardo to dose/monitor vancomycin  · Goal trough level:  15-20 mcg/mL  · Pt is a 47 y/o M who has been admitted for several days due to covid-19 pneumonia  · Serum creatinine today: 1.0; CrCl ~ 95 mL/min; baseline Scr ~ unknown  · 12/3: Trough today @ 0211 = 10.3 mcg/mL  · 12/5: 0230 not collected, will re-time regimen & trough  · 12/6: 0930 not collected, will re-time regimen & trough  · 12/7: Trough @ 0106 = 14.4 mcg/mL    Plan:  · Continue Vancomycin 1,750 mg IV Q12H  · Trough today @ 1630; Please hold the following dose if level is >20 mcg/mL  · Follow renal function  · Pharmacist will follow and monitor/adjust dosing as necessary      Thank you for the consult,    Keila Tran, PharmD 12/9/2020 11:25 AM   [phone]

## 2020-12-09 NOTE — PROGRESS NOTES
CLINICAL PHARMACY NOTE: MEDS TO [address] Select Patient?: No  Total # of Prescriptions Filled: 6   The following medications were delivered to the patient:  · Aspirin 81 chew  · Thiamine 100mg  · Diltiazem er 120mg  · Ergocalciferol 1.25mg  · symbicort 80-4.5mcg  · Albuterol sulfate 108  Total # of Interventions Completed: 5  Time Spent (min): 60    Additional Documentation:

## 2020-12-09 NOTE — CARE COORDINATION
SS NOTE: COVID POSITIVE 11/28. Discharge plan continues to be home and family will provide transport. Pt is currently on 2L nc; no home O2. PT WILL NEED QUALIFYING PULSE OX DOCUMENTATION IN PROGRESS NOTES AND HOME O2 ORDERS IF O2 IS NEEDED AT [facility]. Alex Covarrubias. 12/9/2020.12:10 PM.

## 2020-12-10 ENCOUNTER — CARE COORDINATION (OUTPATIENT)
Dept: CASE MANAGEMENT | Age: 55
End: 2020-12-10

## 2020-12-10 LAB
BLOOD CULTURE, ROUTINE: NORMAL
CULTURE, BLOOD 2: NORMAL

## 2020-12-10 NOTE — CARE COORDINATION
[facility] Transitions Initial Follow Up Call    Call within 2 business days of discharge: Yes    Patient: Tristan Garcia Patient : 1965   MRN: <[id number]>  Reason for Admission: [address]  Discharge Date: 20 RARS: Readmission Risk Score: 15      Last Discharge [facility]       Complaint Diagnosis Description Type Department Provider    20 Hyperglycemia; Other COVID-19 . .. ED to Hosp-Admission (Discharged) (ADMITTED) [address] DO Armando; Gail Orantes,... Attempted to reach pt for follow up call. Left message requesting call back. Care Transitions 24 Hour Call    Care Transitions Interventions         Follow Up  No future appointments.     Melanie Segal

## 2020-12-11 ENCOUNTER — CARE COORDINATION (OUTPATIENT)
Dept: CASE MANAGEMENT | Age: 55
End: 2020-12-11

## 2020-12-11 NOTE — CARE COORDINATION
[facility] Transitions Initial Follow Up Call    Call within 2 business days of discharge: Yes    Patient: Eleanor Navas Patient : 1965   MRN: [id number]  Reason for Admission: COVID-19+  Discharge Date: 20 RARS: Readmission Risk Score: 15      Last Discharge [facility]       Complaint Diagnosis Description Type Department Provider    20 Hyperglycemia; Other COVID-19 . .. ED to Hosp-Admission (Discharged) (ADMITTED) [address] Roberts DO; Laurita Lawton,... Second attempt made today 20 to contact patient for hospital discharge/COVID-19+. Female answers mobile number listed on record ( saying \"There's no Brayan who lives here\". No other number listed on record. CTN signing off.       Libia Garrido, APRN

## 2020-12-22 LAB
Lab: NORMAL
Lab: NORMAL
REPORT: NORMAL
REPORT: NORMAL
THIS TEST SENT TO: NORMAL
THIS TEST SENT TO: NORMAL

## 2022-11-02 ENCOUNTER — OFFICE VISIT (OUTPATIENT)
Dept: FAMILY MEDICINE CLINIC | Age: 57
End: 2022-11-02
Payer: MEDICAID

## 2022-11-02 VITALS
BODY MASS INDEX: 24.78 KG/M2 | TEMPERATURE: 97.2 F | DIASTOLIC BLOOD PRESSURE: 76 MMHG | HEIGHT: 73 IN | WEIGHT: 187 LBS | HEART RATE: 87 BPM | RESPIRATION RATE: 17 BRPM | OXYGEN SATURATION: 98 % | SYSTOLIC BLOOD PRESSURE: 106 MMHG

## 2022-11-02 DIAGNOSIS — R09.81 NASAL CONGESTION: ICD-10-CM

## 2022-11-02 DIAGNOSIS — N34.2 URETHRITIS: Primary | ICD-10-CM

## 2022-11-02 DIAGNOSIS — R36.9 PENILE DISCHARGE: ICD-10-CM

## 2022-11-02 LAB
ALBUMIN SERPL-MCNC: 4.4 G/DL (ref 3.5–5.2)
ALP BLD-CCNC: 124 U/L (ref 40–129)
ALT SERPL-CCNC: 21 U/L (ref 0–40)
ANION GAP SERPL CALCULATED.3IONS-SCNC: 11 MMOL/L (ref 7–16)
AST SERPL-CCNC: 19 U/L (ref 0–39)
BILIRUB SERPL-MCNC: 0.7 MG/DL (ref 0–1.2)
BILIRUBIN DIRECT: <0.2 MG/DL (ref 0–0.3)
BILIRUBIN URINE: NEGATIVE
BILIRUBIN, INDIRECT: NORMAL MG/DL (ref 0–1)
BILIRUBIN, POC: NORMAL
BLOOD URINE, POC: NORMAL
BLOOD, URINE: NEGATIVE
BUN BLDV-MCNC: 12 MG/DL (ref 6–20)
CALCIUM SERPL-MCNC: 9.8 MG/DL (ref 8.6–10.2)
CHLORIDE BLD-SCNC: 99 MMOL/L (ref 98–107)
CHOLESTEROL, TOTAL: 211 MG/DL (ref 0–199)
CLARITY, POC: CLEAR
CLARITY: CLEAR
CO2: 30 MMOL/L (ref 22–29)
COLOR, POC: YELLOW
COLOR: YELLOW
CREAT SERPL-MCNC: 0.9 MG/DL (ref 0.7–1.2)
GFR SERPL CREATININE-BSD FRML MDRD: >60 ML/MIN/1.73
GLUCOSE BLD-MCNC: 103 MG/DL (ref 74–99)
GLUCOSE URINE, POC: NORMAL
GLUCOSE URINE: 500 MG/DL
HBA1C MFR BLD: 10.2 % (ref 4–5.6)
HDLC SERPL-MCNC: 42 MG/DL
KETONES, POC: NORMAL
KETONES, URINE: ABNORMAL MG/DL
LDL CHOLESTEROL CALCULATED: 114 MG/DL (ref 0–99)
LEUKOCYTE EST, POC: NORMAL
LEUKOCYTE ESTERASE, URINE: NEGATIVE
NITRITE, POC: NORMAL
NITRITE, URINE: NEGATIVE
PH UA: 6 (ref 5–9)
PH, POC: 6
POTASSIUM SERPL-SCNC: 3.9 MMOL/L (ref 3.5–5)
PROTEIN UA: NEGATIVE MG/DL
PROTEIN, POC: NORMAL
SODIUM BLD-SCNC: 140 MMOL/L (ref 132–146)
SPECIFIC GRAVITY UA: 1.02 (ref 1–1.03)
SPECIFIC GRAVITY, POC: 1.03
TOTAL PROTEIN: 7 G/DL (ref 6.4–8.3)
TRIGL SERPL-MCNC: 273 MG/DL (ref 0–149)
TSH SERPL DL<=0.05 MIU/L-ACNC: 0.74 UIU/ML (ref 0.27–4.2)
UROBILINOGEN, POC: 2
UROBILINOGEN, URINE: 2 E.U./DL
VLDLC SERPL CALC-MCNC: 55 MG/DL

## 2022-11-02 PROCEDURE — 96372 THER/PROPH/DIAG INJ SC/IM: CPT | Performed by: EMERGENCY MEDICINE

## 2022-11-02 PROCEDURE — G8427 DOCREV CUR MEDS BY ELIG CLIN: HCPCS | Performed by: EMERGENCY MEDICINE

## 2022-11-02 PROCEDURE — 99213 OFFICE O/P EST LOW 20 MIN: CPT | Performed by: EMERGENCY MEDICINE

## 2022-11-02 PROCEDURE — G8420 CALC BMI NORM PARAMETERS: HCPCS | Performed by: EMERGENCY MEDICINE

## 2022-11-02 PROCEDURE — 1036F TOBACCO NON-USER: CPT | Performed by: EMERGENCY MEDICINE

## 2022-11-02 PROCEDURE — 3017F COLORECTAL CA SCREEN DOC REV: CPT | Performed by: EMERGENCY MEDICINE

## 2022-11-02 PROCEDURE — 81002 URINALYSIS NONAUTO W/O SCOPE: CPT | Performed by: EMERGENCY MEDICINE

## 2022-11-02 PROCEDURE — G8484 FLU IMMUNIZE NO ADMIN: HCPCS | Performed by: EMERGENCY MEDICINE

## 2022-11-02 RX ORDER — DOXYCYCLINE HYCLATE 100 MG
100 TABLET ORAL 2 TIMES DAILY
Qty: 14 TABLET | Refills: 0 | Status: SHIPPED | OUTPATIENT
Start: 2022-11-02 | End: 2022-11-09

## 2022-11-02 RX ORDER — GUAIFENESIN AND DEXTROMETHORPHAN HYDROBROMIDE 600; 30 MG/1; MG/1
1 TABLET, EXTENDED RELEASE ORAL 2 TIMES DAILY
Qty: 28 TABLET | Refills: 0 | Status: SHIPPED | OUTPATIENT
Start: 2022-11-02

## 2022-11-02 RX ORDER — CEFTRIAXONE 500 MG/1
500 INJECTION, POWDER, FOR SOLUTION INTRAMUSCULAR; INTRAVENOUS ONCE
Status: COMPLETED | OUTPATIENT
Start: 2022-11-02 | End: 2022-11-02

## 2022-11-02 RX ADMIN — CEFTRIAXONE 500 MG: 500 INJECTION, POWDER, FOR SOLUTION INTRAMUSCULAR; INTRAVENOUS at 16:49

## 2022-11-02 SDOH — ECONOMIC STABILITY: FOOD INSECURITY: WITHIN THE PAST 12 MONTHS, YOU WORRIED THAT YOUR FOOD WOULD RUN OUT BEFORE YOU GOT MONEY TO BUY MORE.: NEVER TRUE

## 2022-11-02 SDOH — ECONOMIC STABILITY: FOOD INSECURITY: WITHIN THE PAST 12 MONTHS, THE FOOD YOU BOUGHT JUST DIDN'T LAST AND YOU DIDN'T HAVE MONEY TO GET MORE.: NEVER TRUE

## 2022-11-02 ASSESSMENT — ENCOUNTER SYMPTOMS
EYE PAIN: 0
DIARRHEA: 0
BACK PAIN: 0
SINUS PRESSURE: 0
SORE THROAT: 0
WHEEZING: 0
ABDOMINAL PAIN: 0
NAUSEA: 0
VOMITING: 0
COUGH: 0
EYE DISCHARGE: 0
EYE REDNESS: 0
SHORTNESS OF BREATH: 0

## 2022-11-02 ASSESSMENT — PATIENT HEALTH QUESTIONNAIRE - PHQ9
2. FEELING DOWN, DEPRESSED OR HOPELESS: 0
SUM OF ALL RESPONSES TO PHQ QUESTIONS 1-9: 0
SUM OF ALL RESPONSES TO PHQ9 QUESTIONS 1 & 2: 0
1. LITTLE INTEREST OR PLEASURE IN DOING THINGS: 0
DEPRESSION UNABLE TO ASSESS: FUNCTIONAL CAPACITY MOTIVATION LIMITS ACCURACY
SUM OF ALL RESPONSES TO PHQ QUESTIONS 1-9: 0

## 2022-11-02 ASSESSMENT — SOCIAL DETERMINANTS OF HEALTH (SDOH): HOW HARD IS IT FOR YOU TO PAY FOR THE VERY BASICS LIKE FOOD, HOUSING, MEDICAL CARE, AND HEATING?: NOT HARD AT ALL

## 2022-11-02 NOTE — PROGRESS NOTES
Chief Complaint:   Sinus Problem (Sinus congestion and drainage,and pressure, cough, chest congestion started five days ago/Burning when urination )      History of Present Illness   HPI:  Savita Malik is a 65 y.o. male who presents to [facility] today for white creamy penile discharge with dysuria, nasal congestion. Prior to Visit Medications    Medication Sig Taking? Authorizing Provider   Dextromethorphan-guaiFENesin (MUCINEX DM)  MG TB12 Take 1 tablet by mouth in the morning and at bedtime Yes Demario Swan, DO   doxycycline hyclate (VIBRA-TABS) 100 MG tablet Take 1 tablet by mouth 2 times daily for 7 days Yes Augie Perez, DO   aspirin 81 MG chewable tablet Take 1 tablet by mouth daily Yes Marissa Landry, DO   budesonide-formoterol (SYMBICORT) 80-4.5 MCG/ACT AERO Inhale 2 puffs into the lungs 2 times daily Yes Agustin Roberts, DO   albuterol sulfate  (90 Base) MCG/ACT inhaler Inhale 2 puffs into the lungs every 6 hours as needed for Wheezing Yes Marissa Landry, DO   zinc sulfate (ZINCATE) 220 (50 Zn) MG capsule Take 1 capsule by mouth daily Yes Marissa Landry DO   vitamin C (VITAMIN C) 1000 MG tablet Take 1 tablet by mouth daily Yes Marissa Landry DO   vitamin B-1 100 MG tablet Take 1 tablet by mouth daily Yes Marissa Landry, DO   vitamin D (ERGOCALCIFEROL) 1.25 MG (41201 UT) CAPS capsule Take 1 capsule by mouth once a week Yes Marissa Landry DO   dilTIAZem (CARDIZEM CD) 120 MG extended release capsule Take 1 capsule by mouth daily Yes Marissa Landry DO   Dulaglutide (TRULICITY) 1.5 TL/1.8ED SOPN Inject 4.5 mg into the skin once a week Takes on friday Yes Historical Provider, MD       Review of Systems   Review of Systems   Constitutional:  Negative for chills and fever. HENT:  Positive for congestion. Negative for ear pain, sinus pressure and sore throat. Eyes:  Negative for pain, discharge and redness.    Respiratory:  Negative for cough, shortness of breath and wheezing. Cardiovascular:  Negative for chest pain. Gastrointestinal:  Negative for abdominal pain, diarrhea, nausea and vomiting. Genitourinary:  Positive for dysuria and penile discharge. Negative for frequency. Musculoskeletal:  Negative for arthralgias and back pain. Skin:  Negative for rash and wound. Neurological:  Negative for weakness and headaches. Hematological:  Negative for adenopathy. Psychiatric/Behavioral: Negative. All other systems reviewed and are negative. Patient's medical, social, and family history reviewed    Past Medical History:  has a past medical history of Diabetes mellitus ([facility] [address] Past Surgical History:  has no past surgical history on file. Social History:  reports that he has never smoked. He has never used smokeless tobacco. He reports that he does not currently use alcohol. He reports that he does not use drugs. Family History: family history includes Heart Disease in his mother; Other in his father and mother. Allergies: Patient has no known allergies. Physical Exam   Vital Signs:  /76   Pulse 87   Temp 97.2 °F (36.2 °C)   Resp 17   Ht 6' 1\" (1.854 m)   Wt 187 lb (84.8 kg)   SpO2 98%   BMI 24.67 kg/m²    Oxygen Saturation Interpretation: Normal.    Physical Exam  Vitals and nursing note reviewed. Constitutional:       Appearance: He is well-developed. HENT:      Head: Normocephalic and atraumatic. Right Ear: Tympanic membrane normal.      Left Ear: Tympanic membrane normal.      Nose: Congestion and rhinorrhea present. Eyes:      Pupils: Pupils are equal, round, and reactive to light. Cardiovascular:      Rate and Rhythm: Normal rate and regular rhythm. Heart sounds: Normal heart sounds. No murmur heard. Pulmonary:      Effort: Pulmonary effort is normal. No respiratory distress. Breath sounds: Normal breath sounds. No wheezing or rales.    Abdominal:      General: Bowel sounds are normal.      Palpations: Abdomen is soft. Tenderness: There is no abdominal tenderness. There is no guarding or rebound. Musculoskeletal:      Cervical back: Normal range of motion and neck supple. Skin:     General: Skin is warm and dry. Neurological:      Mental Status: He is alert and oriented to person, place, and time. Cranial Nerves: No cranial nerve deficit. Coordination: Coordination normal.         Test Results Section   (All laboratory and radiology results have been personally reviewed by myself)  Labs:  No results found for this visit on 11/02/22. Imaging: All Radiology results interpreted by Radiologist unless otherwise noted. No results found. Assessment / Plan   Impression(s):  Brayan was seen today for sinus problem. Diagnoses and all orders for this visit:    Urethritis  -     cefTRIAXone (ROCEPHIN) injection 500 mg  -     doxycycline hyclate (VIBRA-TABS) 100 MG tablet; Take 1 tablet by mouth 2 times daily for 7 days    Penile discharge  -     cefTRIAXone (ROCEPHIN) injection 500 mg  -     doxycycline hyclate (VIBRA-TABS) 100 MG tablet; Take 1 tablet by mouth 2 times daily for 7 days    Nasal congestion  -     Dextromethorphan-guaiFENesin (MUCINEX DM)  MG TB12; Take 1 tablet by mouth in the morning and at bedtime      Discharged home. Patient condition is good    No follow-ups on file.      New Medications     New Prescriptions    DEXTROMETHORPHAN-GUAIFENESIN (MUCINEX DM)  MG TB12    Take 1 tablet by mouth in the morning and at bedtime    DOXYCYCLINE HYCLATE (VIBRA-TABS) 100 MG TABLET    Take 1 tablet by mouth 2 times daily for 7 days       Electronically signed by Dre Amanda DO   DD: 11/2/22

## 2022-11-03 LAB — VITAMIN D 25-HYDROXY: 36 NG/ML (ref 30–100)

## 2022-11-05 LAB — C-PEPTIDE: 0.9 NG/ML (ref 0.5–3.3)

## 2022-11-07 LAB
C. TRACHOMATIS DNA ,URINE: NEGATIVE
N. GONORRHOEAE DNA, URINE: ABNORMAL
SOURCE: ABNORMAL

## 2022-11-09 ENCOUNTER — OFFICE VISIT (OUTPATIENT)
Dept: FAMILY MEDICINE CLINIC | Age: 57
End: 2022-11-09
Payer: MEDICAID

## 2022-11-09 VITALS
HEIGHT: 73 IN | BODY MASS INDEX: 24.78 KG/M2 | OXYGEN SATURATION: 98 % | SYSTOLIC BLOOD PRESSURE: 130 MMHG | TEMPERATURE: 97.2 F | DIASTOLIC BLOOD PRESSURE: 87 MMHG | RESPIRATION RATE: 17 BRPM | HEART RATE: 91 BPM | WEIGHT: 187 LBS

## 2022-11-09 DIAGNOSIS — Z20.2 EXPOSURE TO GONORRHEA: Primary | ICD-10-CM

## 2022-11-09 PROCEDURE — G8484 FLU IMMUNIZE NO ADMIN: HCPCS | Performed by: EMERGENCY MEDICINE

## 2022-11-09 PROCEDURE — 3017F COLORECTAL CA SCREEN DOC REV: CPT | Performed by: EMERGENCY MEDICINE

## 2022-11-09 PROCEDURE — 1036F TOBACCO NON-USER: CPT | Performed by: EMERGENCY MEDICINE

## 2022-11-09 PROCEDURE — 99212 OFFICE O/P EST SF 10 MIN: CPT | Performed by: EMERGENCY MEDICINE

## 2022-11-09 PROCEDURE — G8427 DOCREV CUR MEDS BY ELIG CLIN: HCPCS | Performed by: EMERGENCY MEDICINE

## 2022-11-09 PROCEDURE — G8420 CALC BMI NORM PARAMETERS: HCPCS | Performed by: EMERGENCY MEDICINE

## 2022-11-09 RX ORDER — CEFIXIME 400 MG/1
800 CAPSULE ORAL DAILY
Qty: 2 CAPSULE | Refills: 0 | Status: SHIPPED
Start: 2022-11-09 | End: 2022-11-10 | Stop reason: RX

## 2022-11-09 ASSESSMENT — ENCOUNTER SYMPTOMS
VOMITING: 0
ABDOMINAL PAIN: 0
EYE DISCHARGE: 0
WHEEZING: 0
SINUS PRESSURE: 0
SORE THROAT: 0
NAUSEA: 0
SHORTNESS OF BREATH: 0
BACK PAIN: 0
DIARRHEA: 0
EYE REDNESS: 0
EYE PAIN: 0
COUGH: 0

## 2022-11-09 NOTE — PROGRESS NOTES
Chief Complaint:   Exposure to STD (Was exposed for second time)      History of Present Illness   HPI:  Corey Garcia is a 65 y.o. male who presents to [facility] today for girlfriend with documented GC vagina; pt was re-exposed after recent treatment for same. Here for re-eval. No discharge or symptoms per se. Prior to Visit Medications    Medication Sig Taking? Authorizing Provider   cefixime (SUPRAX) 400 MG CAPS capsule Take 2 capsules by mouth daily for 1 day Yes Ponchatoulamarian Perez, DO   Dextromethorphan-guaiFENesin ([place] [address] DM)  MG TB12 Take 1 tablet by mouth in the morning and at bedtime Yes Marisel Perez, DO   doxycycline hyclate (VIBRA-TABS) 100 MG tablet Take 1 tablet by mouth 2 times daily for 7 days Yes Augie Perez, DO   aspirin 81 MG chewable tablet Take 1 tablet by mouth daily Yes Milena Feeling, DO   budesonide-formoterol (SYMBICORT) 80-4.5 MCG/ACT AERO Inhale 2 puffs into the lungs 2 times daily Yes Agustin Roberts, DO   albuterol sulfate  (90 Base) MCG/ACT inhaler Inhale 2 puffs into the lungs every 6 hours as needed for Wheezing Yes Chambers Feeling, DO   zinc sulfate (ZINCATE) 220 (50 Zn) MG capsule Take 1 capsule by mouth daily Yes Chambers Feeling, DO   vitamin C (VITAMIN C) 1000 MG tablet Take 1 tablet by mouth daily Yes Chambers Feeling, DO   vitamin B-1 100 MG tablet Take 1 tablet by mouth daily Yes Chambers Feeling, DO   vitamin D (ERGOCALCIFEROL) 1.25 MG (22468 UT) CAPS capsule Take 1 capsule by mouth once a week Yes Milena Feeling, DO   dilTIAZem (CARDIZEM CD) 120 MG extended release capsule Take 1 capsule by mouth daily Yes Chambers Feeling, DO   Dulaglutide (TRULICITY) 1.5 YD/6.1ZK SOPN Inject 4.5 mg into the skin once a week Takes on friday Yes Historical Provider, MD       Review of Systems   Review of Systems   Constitutional:  Negative for chills and fever. HENT:  Negative for ear pain, sinus pressure and sore throat.     Eyes:  Negative for pain, discharge and redness. Respiratory:  Negative for cough, shortness of breath and wheezing. Cardiovascular:  Negative for chest pain. Gastrointestinal:  Negative for abdominal pain, diarrhea, nausea and vomiting. Genitourinary:  Negative for dysuria and frequency. Musculoskeletal:  Negative for arthralgias and back pain. Skin:  Negative for rash and wound. Neurological:  Negative for weakness and headaches. Hematological:  Negative for adenopathy. Psychiatric/Behavioral: Negative. All other systems reviewed and are negative. Patient's medical, social, and family history reviewed    Past Medical History:  has a past medical history of Diabetes mellitus ([facility] [address] Past Surgical History:  has no past surgical history on file. Social History:  reports that he has never smoked. He has never used smokeless tobacco. He reports that he does not currently use alcohol. He reports that he does not use drugs. Family History: family history includes Heart Disease in his mother; Other in his father and mother. Allergies: Patient has no known allergies. Physical Exam   Vital Signs:  /87   Pulse 91   Temp 97.2 °F (36.2 °C) (Temporal)   Resp 17   Ht 6' 1\" (1.854 m)   Wt 187 lb (84.8 kg)   SpO2 98%   BMI 24.67 kg/m²    Oxygen Saturation Interpretation: Normal.    Physical Exam  Vitals and nursing note reviewed. Constitutional:       Appearance: He is well-developed. HENT:      Head: Normocephalic and atraumatic. Eyes:      Pupils: Pupils are equal, round, and reactive to light. Cardiovascular:      Rate and Rhythm: Normal rate and regular rhythm. Heart sounds: Normal heart sounds. No murmur heard. Pulmonary:      Effort: Pulmonary effort is normal. No respiratory distress. Breath sounds: Normal breath sounds. No wheezing or rales. Abdominal:      General: Bowel sounds are normal.      Palpations: Abdomen is soft. Tenderness: There is no abdominal tenderness.  There is no guarding or rebound. Musculoskeletal:      Cervical back: Normal range of motion and neck supple. Skin:     General: Skin is warm and dry. Neurological:      Mental Status: He is alert and oriented to person, place, and time. Cranial Nerves: No cranial nerve deficit. Coordination: Coordination normal.       Test Results Section   (All laboratory and radiology results have been personally reviewed by myself)  Labs:  No results found for this visit on 11/09/22. Imaging: All Radiology results interpreted by Radiologist unless otherwise noted. No results found. Assessment / Plan   Impression(s):  Brayan was seen today for exposure to std. Diagnoses and all orders for this visit:    Exposure to gonorrhea  -     cefixime (SUPRAX) 400 MG CAPS capsule; Take 2 capsules by mouth daily for 1 day    Other orders  -     C.TRACHOMATIS Mireya López; Future      Discharged home. Patient condition is good    No follow-ups on file.      New Medications     New Prescriptions    CEFIXIME (SUPRAX) 400 MG CAPS CAPSULE    Take 2 capsules by mouth daily for 1 day       Electronically signed by Anna Bee DO   DD: 11/9/22

## 2022-11-10 ENCOUNTER — TELEPHONE (OUTPATIENT)
Dept: FAMILY MEDICINE CLINIC | Age: 57
End: 2022-11-10

## 2022-11-10 DIAGNOSIS — Z20.2 EXPOSURE TO GONORRHEA: ICD-10-CM

## 2022-11-10 DIAGNOSIS — A64 STD (MALE): Primary | ICD-10-CM

## 2022-11-10 RX ORDER — AZITHROMYCIN 500 MG/1
2000 TABLET, FILM COATED ORAL DAILY
Qty: 4 TABLET | Refills: 0 | Status: SHIPPED | OUTPATIENT
Start: 2022-11-10 | End: 2022-11-11

## 2022-11-10 NOTE — PROGRESS NOTES
Zithromax 2 g po called in to [facility]; [facility]; [facility]  Prior Rx called [facility], ie., SUPAX for make GC infection.

## 2022-11-10 NOTE — TELEPHONE ENCOUNTER
Pharmacy called Cefixime has been pulled off shelves so need to switch to something else .  Sorry :(

## 2022-11-14 LAB
C. TRACHOMATIS DNA ,URINE: NEGATIVE
N. GONORRHOEAE DNA, URINE: NEGATIVE
SOURCE: NORMAL

## 2023-08-03 LAB
25(OH)D3 SERPL-MCNC: 33.3 NG/ML (ref 30–100)
ALBUMIN SERPL-MCNC: 4.4 G/DL (ref 3.5–5.2)
ALP SERPL-CCNC: 159 U/L (ref 40–129)
ALT SERPL-CCNC: 15 U/L (ref 0–40)
ANION GAP SERPL CALCULATED.3IONS-SCNC: 12 MMOL/L (ref 7–16)
AST SERPL-CCNC: 12 U/L (ref 0–39)
BILIRUB DIRECT SERPL-MCNC: <0.2 MG/DL (ref 0–0.3)
BILIRUB INDIRECT SERPL-MCNC: ABNORMAL MG/DL (ref 0–1)
BILIRUB SERPL-MCNC: 0.7 MG/DL (ref 0–1.2)
BILIRUB UR QL STRIP: NEGATIVE
BUN SERPL-MCNC: 13 MG/DL (ref 6–20)
CALCIUM SERPL-MCNC: 9.3 MG/DL (ref 8.6–10.2)
CHLORIDE SERPL-SCNC: 98 MMOL/L (ref 98–107)
CHOLEST SERPL-MCNC: 186 MG/DL
CLARITY UR: CLEAR
CO2 SERPL-SCNC: 27 MMOL/L (ref 22–29)
COLOR UR: YELLOW
COMMENT: ABNORMAL
CREAT SERPL-MCNC: 0.8 MG/DL (ref 0.7–1.2)
GFR SERPL CREATININE-BSD FRML MDRD: >60 ML/MIN/1.73M2
GLUCOSE SERPL-MCNC: 299 MG/DL (ref 74–99)
GLUCOSE UR STRIP-MCNC: 500 MG/DL
HBA1C MFR BLD: 9.7 % (ref 4–5.6)
HDLC SERPL-MCNC: 45 MG/DL
HGB UR QL STRIP.AUTO: NEGATIVE
KETONES UR STRIP-MCNC: NEGATIVE MG/DL
LDLC SERPL CALC-MCNC: 116 MG/DL
LEUKOCYTE ESTERASE UR QL STRIP: NEGATIVE
NITRITE UR QL STRIP: NEGATIVE
PH UR STRIP: 5.5 [PH] (ref 5–9)
POTASSIUM SERPL-SCNC: 4.2 MMOL/L (ref 3.5–5)
PROT SERPL-MCNC: 6.9 G/DL (ref 6.4–8.3)
PROT UR STRIP-MCNC: NEGATIVE MG/DL
SODIUM SERPL-SCNC: 137 MMOL/L (ref 132–146)
SP GR UR STRIP: 1.02 (ref 1–1.03)
TRIGL SERPL-MCNC: 124 MG/DL
TSH SERPL DL<=0.05 MIU/L-ACNC: 0.68 UIU/ML (ref 0.27–4.2)
UROBILINOGEN UR STRIP-ACNC: 2 EU/DL (ref 0–1)
VLDLC SERPL CALC-MCNC: 25 MG/DL

## 2024-05-08 ENCOUNTER — OFFICE VISIT (OUTPATIENT)
Dept: FAMILY MEDICINE CLINIC | Age: 59
End: 2024-05-08
Payer: MEDICAID

## 2024-05-08 VITALS
WEIGHT: 173 LBS | TEMPERATURE: 97.9 F | SYSTOLIC BLOOD PRESSURE: 122 MMHG | HEIGHT: 73 IN | RESPIRATION RATE: 18 BRPM | BODY MASS INDEX: 22.93 KG/M2 | DIASTOLIC BLOOD PRESSURE: 74 MMHG | OXYGEN SATURATION: 97 % | HEART RATE: 94 BPM

## 2024-05-08 DIAGNOSIS — R35.0 URINE FREQUENCY: ICD-10-CM

## 2024-05-08 DIAGNOSIS — E11.9 TYPE 2 DIABETES MELLITUS WITHOUT COMPLICATION, WITH LONG-TERM CURRENT USE OF INSULIN (HCC): Primary | ICD-10-CM

## 2024-05-08 DIAGNOSIS — Z79.4 TYPE 2 DIABETES MELLITUS WITHOUT COMPLICATION, WITH LONG-TERM CURRENT USE OF INSULIN (HCC): Primary | ICD-10-CM

## 2024-05-08 LAB
BILIRUBIN, POC: NEGATIVE
BILIRUBIN, URINE: NEGATIVE
BLOOD URINE, POC: NEGATIVE
CLARITY, POC: NORMAL
COLOR, POC: NORMAL
COLOR: YELLOW
CREATININE URINE: 75.6 MG/DL (ref 40–278)
GLUCOSE URINE, POC: NORMAL
GLUCOSE URINE: >=1000 MG/DL
HBA1C MFR BLD: 14.5 %
KETONES, POC: NEGATIVE
KETONES, URINE: NEGATIVE MG/DL
LEUKOCYTE EST, POC: NEGATIVE
LEUKOCYTE ESTERASE, URINE: NEGATIVE
MICROALBUMIN/CREAT 24H UR: <12 MG/L (ref 0–19)
MICROALBUMIN/CREAT UR-RTO: NORMAL MCG/MG CREAT (ref 0–30)
NITRITE, POC: NEGATIVE
NITRITE, URINE: NEGATIVE
PH, POC: 6
PH, URINE: 6 (ref 5–9)
PROTEIN UA: NEGATIVE MG/DL
PROTEIN, POC: NEGATIVE
RBC UA: ABNORMAL /HPF
SPECIFIC GRAVITY UA: 1.01 (ref 1–1.03)
SPECIFIC GRAVITY, POC: 1.02
TURBIDITY: CLEAR
URINE HGB: NEGATIVE
UROBILINOGEN, POC: NORMAL
UROBILINOGEN, URINE: 1 EU/DL (ref 0–1)
WBC UA: ABNORMAL /HPF

## 2024-05-08 PROCEDURE — 99214 OFFICE O/P EST MOD 30 MIN: CPT

## 2024-05-08 PROCEDURE — 3046F HEMOGLOBIN A1C LEVEL >9.0%: CPT

## 2024-05-08 PROCEDURE — 2022F DILAT RTA XM EVC RTNOPTHY: CPT

## 2024-05-08 PROCEDURE — G8420 CALC BMI NORM PARAMETERS: HCPCS

## 2024-05-08 PROCEDURE — 81002 URINALYSIS NONAUTO W/O SCOPE: CPT

## 2024-05-08 PROCEDURE — 83036 HEMOGLOBIN GLYCOSYLATED A1C: CPT

## 2024-05-08 PROCEDURE — 1036F TOBACCO NON-USER: CPT

## 2024-05-08 PROCEDURE — G8427 DOCREV CUR MEDS BY ELIG CLIN: HCPCS

## 2024-05-08 PROCEDURE — 3017F COLORECTAL CA SCREEN DOC REV: CPT

## 2024-05-08 RX ORDER — DULAGLUTIDE 1.5 MG/.5ML
4.5 INJECTION, SOLUTION SUBCUTANEOUS WEEKLY
Qty: 18 ML | Refills: 0 | Status: SHIPPED | OUTPATIENT
Start: 2024-05-08 | End: 2024-08-06

## 2024-05-08 RX ORDER — INSULIN LISPRO 100 [IU]/ML
30 INJECTION, SOLUTION INTRAVENOUS; SUBCUTANEOUS
Qty: 27 ML | Refills: 0 | Status: SHIPPED | OUTPATIENT
Start: 2024-05-08 | End: 2024-06-07

## 2024-05-08 RX ORDER — ATORVASTATIN CALCIUM 20 MG/1
20 TABLET, FILM COATED ORAL DAILY
Qty: 30 TABLET | Refills: 3 | Status: SHIPPED | OUTPATIENT
Start: 2024-05-08

## 2024-05-08 RX ORDER — INSULIN LISPRO 100 [IU]/ML
30 INJECTION, SOLUTION INTRAVENOUS; SUBCUTANEOUS
Qty: 27 ML | Refills: 0 | Status: SHIPPED
Start: 2024-05-08 | End: 2024-05-08 | Stop reason: CLARIF

## 2024-05-08 RX ORDER — LISINOPRIL 10 MG/1
10 TABLET ORAL DAILY
Qty: 30 TABLET | Refills: 0 | Status: SHIPPED | OUTPATIENT
Start: 2024-05-08

## 2024-05-08 RX ORDER — ATORVASTATIN CALCIUM 20 MG/1
20 TABLET, FILM COATED ORAL DAILY
Qty: 30 TABLET | Refills: 3 | Status: SHIPPED
Start: 2024-05-08 | End: 2024-05-08 | Stop reason: CLARIF

## 2024-05-08 RX ORDER — LISINOPRIL 10 MG/1
10 TABLET ORAL DAILY
Qty: 30 TABLET | Refills: 0 | Status: SHIPPED
Start: 2024-05-08 | End: 2024-05-08 | Stop reason: CLARIF

## 2024-05-08 SDOH — ECONOMIC STABILITY: HOUSING INSECURITY
IN THE LAST 12 MONTHS, WAS THERE A TIME WHEN YOU DID NOT HAVE A STEADY PLACE TO SLEEP OR SLEPT IN A SHELTER (INCLUDING NOW)?: NO

## 2024-05-08 SDOH — ECONOMIC STABILITY: FOOD INSECURITY: WITHIN THE PAST 12 MONTHS, THE FOOD YOU BOUGHT JUST DIDN'T LAST AND YOU DIDN'T HAVE MONEY TO GET MORE.: NEVER TRUE

## 2024-05-08 SDOH — ECONOMIC STABILITY: INCOME INSECURITY: HOW HARD IS IT FOR YOU TO PAY FOR THE VERY BASICS LIKE FOOD, HOUSING, MEDICAL CARE, AND HEATING?: NOT VERY HARD

## 2024-05-08 SDOH — ECONOMIC STABILITY: FOOD INSECURITY: WITHIN THE PAST 12 MONTHS, YOU WORRIED THAT YOUR FOOD WOULD RUN OUT BEFORE YOU GOT MONEY TO BUY MORE.: NEVER TRUE

## 2024-05-08 ASSESSMENT — PATIENT HEALTH QUESTIONNAIRE - PHQ9
1. LITTLE INTEREST OR PLEASURE IN DOING THINGS: NOT AT ALL
SUM OF ALL RESPONSES TO PHQ9 QUESTIONS 1 & 2: 0
SUM OF ALL RESPONSES TO PHQ QUESTIONS 1-9: 0
2. FEELING DOWN, DEPRESSED OR HOPELESS: NOT AT ALL

## 2024-05-08 NOTE — PROGRESS NOTES
[facility]  FAMILY MEDICINE RESIDENCY PROGRAM  DATE OF VISIT : 2024    Patient : Brayan Olsen   Age : 58 y.o.    : 1965   MRN : [id number]   ________________________________________________________________    Chief Complaint:   Chief Complaint   Patient presents with    [facility]    Diabetes       HPI:   History obtained from the patient. Brayan Olsen is a 58 y.o. male here for establishing care  No smoke, no drugs, no alcohol    Has a PMH of     DM  States he has been off his medications for the past 3 months   Was On trulicty 1.5mg weekly.   Metformin 500mgs BID  Was using Lispro 30 units with meals .  Hemoglobin A1C   Date Value Ref Range Status   2024 14.5 % Final   Has been having polydipsia, polyuria. Has been losing weiight, 7 pounds in the last 2 months.   Checks glucoses daily, around 400s.      Hyperlipidemia  Was previosuly on atorvastatin. Unknown dose.  Lab Results   Component Value Date    CHOL 186 2023    TRIG 124 2023    HDL 45 2023     (H) 2023    VLDL 25 2023           HTN  On lisinopril, does not know dose/  No chest pain sob  Does not check bp at home             I reviewed the patient's past medications, allergies, past medical history, past surgical history, family history and social history during this visit      Physical Exam:    Vitals: /74 (Site: Right Upper Arm, Position: Sitting, Cuff Size: Medium Adult)   Pulse 94   Temp 97.9 °F (36.6 °C) (Temporal)   Resp 18   Ht 1.854 m (6' 1\")   Wt 78.5 kg (173 lb)   SpO2 97%   BMI 22.82 kg/m²     Physical Exam  Constitutional:       General:  not in acute distress.     Appearance: Normal appearance. not ill-appearing.   Cardiovascular:      Rate and Rhythm: Normal rate and regular rhythm.      Heart sounds: Normal heart sounds. No murmur heard.  Pulmonary:      Effort: Pulmonary effort is normal.      Breath sounds: Normal breath sounds. No wheezing, rhonchi

## 2024-05-08 NOTE — PROGRESS NOTES
S: 58 y.o. male presents today for:   DM- off meds x 2 months, on metformin and lispro. Admits es polyuria, polydipsia or polyphagia. Glucose >400.  HLD- on lipitor, uncertain of dose.     O: VS: /74 (Site: Right Upper Arm, Position: Sitting, Cuff Size: Medium Adult)   Pulse 94   Temp 97.9 °F (36.6 °C) (Temporal)   Resp 18   Ht 1.854 m (6' 1\")   Wt 78.5 kg (173 lb)   SpO2 97%   BMI 22.82 kg/m²   AAO/NAD, appropriate affect for mood  CV:  RRR, no murmur  Resp: CTAB    Impression/Plan:   1) DM- restart meds, consider ACE, ASA, UA, HIMA  2) HLD- restart statin   3) HM- reviewed    Attending Physician Statement  I have discussed the case, including pertinent history and exam findings with the resident.  I agree with the documented assessment and plan.      Dana Bond, DO

## 2024-05-10 ENCOUNTER — TELEPHONE (OUTPATIENT)
Dept: FAMILY MEDICINE CLINIC | Age: 59
End: 2024-05-10

## 2024-05-10 NOTE — TELEPHONE ENCOUNTER
Pt called to request that the following medication be sent to [facility]:  Lisinopril  Metformin   Humalog Kwikpen  Atorvastatin    In addition, pt is requesting refills on to be sent to [facility]:  Tadalafil  Terbnafine HCL  Vit D  One touch ultra 2 test strips

## 2024-05-13 DIAGNOSIS — Z79.4 TYPE 2 DIABETES MELLITUS WITHOUT COMPLICATION, WITH LONG-TERM CURRENT USE OF INSULIN (HCC): ICD-10-CM

## 2024-05-13 DIAGNOSIS — E11.9 TYPE 2 DIABETES MELLITUS WITHOUT COMPLICATION, WITH LONG-TERM CURRENT USE OF INSULIN (HCC): ICD-10-CM

## 2024-05-13 RX ORDER — ATORVASTATIN CALCIUM 20 MG/1
20 TABLET, FILM COATED ORAL DAILY
Qty: 30 TABLET | Refills: 3 | Status: SHIPPED | OUTPATIENT
Start: 2024-05-13

## 2024-05-13 RX ORDER — INSULIN LISPRO 100 [IU]/ML
30 INJECTION, SOLUTION INTRAVENOUS; SUBCUTANEOUS
Qty: 27 ML | Refills: 0 | Status: SHIPPED
Start: 2024-05-13 | End: 2024-05-15 | Stop reason: SDUPTHER

## 2024-05-13 RX ORDER — ERGOCALCIFEROL 1.25 MG/1
50000 CAPSULE ORAL WEEKLY
Qty: 5 CAPSULE | Refills: 3 | Status: SHIPPED | OUTPATIENT
Start: 2024-05-13

## 2024-05-13 RX ORDER — LISINOPRIL 10 MG/1
10 TABLET ORAL DAILY
Qty: 30 TABLET | Refills: 0 | Status: SHIPPED
Start: 2024-05-13 | End: 2024-05-15 | Stop reason: SDUPTHER

## 2024-05-15 ENCOUNTER — TELEPHONE (OUTPATIENT)
Dept: FAMILY MEDICINE CLINIC | Age: 59
End: 2024-05-15

## 2024-05-15 DIAGNOSIS — E11.9 TYPE 2 DIABETES MELLITUS WITHOUT COMPLICATION, WITH LONG-TERM CURRENT USE OF INSULIN (HCC): ICD-10-CM

## 2024-05-15 DIAGNOSIS — Z79.4 TYPE 2 DIABETES MELLITUS WITHOUT COMPLICATION, WITH LONG-TERM CURRENT USE OF INSULIN (HCC): ICD-10-CM

## 2024-05-15 RX ORDER — INSULIN LISPRO 100 [IU]/ML
30 INJECTION, SOLUTION INTRAVENOUS; SUBCUTANEOUS
Qty: 27 ML | Refills: 0 | Status: SHIPPED | OUTPATIENT
Start: 2024-05-15 | End: 2024-06-14

## 2024-05-15 RX ORDER — DILTIAZEM HYDROCHLORIDE 120 MG/1
120 CAPSULE, COATED, EXTENDED RELEASE ORAL DAILY
Qty: 30 CAPSULE | Refills: 1 | Status: SHIPPED | OUTPATIENT
Start: 2024-05-15

## 2024-05-15 RX ORDER — LISINOPRIL 10 MG/1
10 TABLET ORAL DAILY
Qty: 30 TABLET | Refills: 0 | Status: SHIPPED | OUTPATIENT
Start: 2024-05-15

## 2024-05-15 NOTE — TELEPHONE ENCOUNTER
Pt called again to request the following medication be sent to [facility]:    Lisinopril  Metformin  Humalog Kwickpen  Atorvastatin  Tadalafil  Terbnafine HCL  Vit D  One touch ultra 2 test strips

## 2024-05-16 DIAGNOSIS — E11.9 TYPE 2 DIABETES MELLITUS WITHOUT COMPLICATION, WITH LONG-TERM CURRENT USE OF INSULIN (HCC): Primary | ICD-10-CM

## 2024-05-16 DIAGNOSIS — Z79.4 TYPE 2 DIABETES MELLITUS WITHOUT COMPLICATION, WITH LONG-TERM CURRENT USE OF INSULIN (HCC): Primary | ICD-10-CM

## 2024-05-16 RX ORDER — DULAGLUTIDE 3 MG/.5ML
3 INJECTION, SOLUTION SUBCUTANEOUS WEEKLY
Status: CANCELLED | OUTPATIENT
Start: 2024-05-16

## 2024-05-16 NOTE — TELEPHONE ENCOUNTER
Last Appointment:  5/8/2024  Future Appointments   Date Time Provider Department Center   5/20/2024  1:40 PM Jordan Chavez MD [facility]     Patient states that he is able to Get Trulicity 3 mg pens at [facility]

## 2024-05-20 ENCOUNTER — OFFICE VISIT (OUTPATIENT)
Dept: FAMILY MEDICINE CLINIC | Age: 59
End: 2024-05-20
Payer: MEDICAID

## 2024-05-20 VITALS
OXYGEN SATURATION: 98 % | WEIGHT: 182 LBS | HEIGHT: 73 IN | BODY MASS INDEX: 24.12 KG/M2 | RESPIRATION RATE: 18 BRPM | TEMPERATURE: 98.1 F | HEART RATE: 78 BPM | SYSTOLIC BLOOD PRESSURE: 138 MMHG | DIASTOLIC BLOOD PRESSURE: 75 MMHG

## 2024-05-20 DIAGNOSIS — Z79.4 TYPE 2 DIABETES MELLITUS WITHOUT COMPLICATION, WITH LONG-TERM CURRENT USE OF INSULIN (HCC): Primary | ICD-10-CM

## 2024-05-20 DIAGNOSIS — B35.1 ONYCHOMYCOSIS: ICD-10-CM

## 2024-05-20 DIAGNOSIS — E11.9 TYPE 2 DIABETES MELLITUS WITHOUT COMPLICATION, WITH LONG-TERM CURRENT USE OF INSULIN (HCC): Primary | ICD-10-CM

## 2024-05-20 LAB
CHP ED QC CHECK: NORMAL
GLUCOSE BLD-MCNC: 89 MG/DL

## 2024-05-20 PROCEDURE — G8427 DOCREV CUR MEDS BY ELIG CLIN: HCPCS

## 2024-05-20 PROCEDURE — 3017F COLORECTAL CA SCREEN DOC REV: CPT

## 2024-05-20 PROCEDURE — 3046F HEMOGLOBIN A1C LEVEL >9.0%: CPT

## 2024-05-20 PROCEDURE — 82962 GLUCOSE BLOOD TEST: CPT

## 2024-05-20 PROCEDURE — 99213 OFFICE O/P EST LOW 20 MIN: CPT

## 2024-05-20 PROCEDURE — 2022F DILAT RTA XM EVC RTNOPTHY: CPT

## 2024-05-20 PROCEDURE — 1036F TOBACCO NON-USER: CPT

## 2024-05-20 PROCEDURE — G8420 CALC BMI NORM PARAMETERS: HCPCS

## 2024-05-20 RX ORDER — TADALAFIL 10 MG/1
10 TABLET ORAL DAILY PRN
Qty: 30 TABLET | Refills: 1 | Status: SHIPPED | OUTPATIENT
Start: 2024-05-20

## 2024-05-20 RX ORDER — DULAGLUTIDE 3 MG/.5ML
3 INJECTION, SOLUTION SUBCUTANEOUS WEEKLY
Qty: 7 ADJUSTABLE DOSE PRE-FILLED PEN SYRINGE | Refills: 2 | Status: SHIPPED | OUTPATIENT
Start: 2024-05-20

## 2024-05-20 RX ORDER — BUPRENORPHINE 8 MG/1
8 TABLET SUBLINGUAL DAILY
COMMUNITY
Start: 2024-04-19

## 2024-05-20 NOTE — PROGRESS NOTES
[facility]  FAMILY MEDICINE RESIDENCY PROGRAM  DATE OF VISIT : 2024    Patient : Brayan Olsen   Age : 58 y.o.    : 1965   MRN : [id number]   ________________________________________________________________    Chief Complaint:   Chief Complaint   Patient presents with    Follow-up     Patient presents today for a 1 week follow up on DM.        HPI:   History obtained from the patient. Brayan Olsen is a 58 y.o. male here for establishing care  No smoke, no drugs, no alcohol    Has a PMH of     DM- Initial visit  States he has been off his medications for the past 3 months   Was On trulicty 1.5mg weekly.   Metformin 500mgs BID  Was using Lispro 30 units with meals .  Hemoglobin A1C   Date Value Ref Range Status   2024 14.5 % Final   Has been having polydipsia, polyuria. Has been losing weiight, 7 pounds in the last 2 months.   Checks glucoses daily, around 400s.    Last visit, He was   Restart trulicity, lispro 30 u tid , metformin  Will start Lipitor 20mgs and Lisinopril 100 mgs for kidney protection.  Today,   Has been checking glucoses daily,  Seem to be around 150 range.  Patient has been using his medications as instructed.  Was unable to get Trulicity.  Requesting to be sent to different pharmacy.  Reports no polydipsia, polyuria, polyphagia.          Hyperlipidemia  Restarted on his Lipitor 20 mg.  Reports no side effects of medications.  Lab Results   Component Value Date    CHOL 186 2023    TRIG 124 2023    HDL 45 2023     (H) 2023    VLDL 25 2023       HTN  On lisinopril 10 mg.  No chest pain sob  Does not check bp at home       Onychomycosis  Was getting terbinafine from previous PCP  Reports that he still has some onychomycosis on the right lower extremity.    Erectile dysfunction  Brings records from previous PCP  Has not been able to get the Elavil since stopped seeing previous provider.      I reviewed the patient's past

## 2024-05-20 NOTE — PROGRESS NOTES
[facility]  FAMILY MEDICINE RESIDENCY PROGRAM  DATE OF VISIT : 2024    Patient : Brayan Olsen   Age : 58 y.o.    : 1965   MRN : [id number]   ________________________________________________________________    Chief Complaint:   Chief Complaint   Patient presents with    Follow-up     Patient presents today for a 1 week follow up on DM.        HPI:   History obtained from the patient. Brayan Olsen is a 58 y.o. male here for establishing care  No smoke, no drugs, no alcohol    Has a PMH of     DM- Initial visit  States he has been off his medications for the past 3 months   Was On trulicty 1.5mg weekly.   Metformin 500mgs BID  Was using Lispro 30 units with meals .  Hemoglobin A1C   Date Value Ref Range Status   2024 14.5 % Final   Has been having polydipsia, polyuria. Has been losing weiight, 7 pounds in the last 2 months.   Checks glucoses daily, around 400s.    Last visit, He was   Restart trulicity, lispro 30 u tid , metformin  Will start Lipitor 20mgs and Lisinopril 100 mgs for kidney protection.  Today,   Has been checking glucoses daily,  Seem to be around 150 range.  Patient has been using his medications as instructed.  Was unable to get Trulicity.  Requesting to be sent to different pharmacy.  Reports no polydipsia, polyuria, polyphagia.          Hyperlipidemia  Restarted on his Lipitor 20 mg.  Reports no side effects of medications.  Lab Results   Component Value Date    CHOL 186 2023    TRIG 124 2023    HDL 45 2023     (H) 2023    VLDL 25 2023       HTN  On lisinopril 10 mg.  No chest pain sob  Does not check bp at home       Onychomycosis  Was getting terbinafine from previous PCP  Reports that he still has some onychomycosis on the right lower extremity.    Erectile dysfunction  Brings records from previous PCP  Has not been able to get the Elavil since stopped seeing previous provider.      I reviewed the patient's past

## 2024-06-04 ENCOUNTER — TELEPHONE (OUTPATIENT)
Dept: FAMILY MEDICINE CLINIC | Age: 59
End: 2024-06-04

## 2024-06-05 RX ORDER — LIDOCAINE 50 MG/G
1 PATCH TOPICAL DAILY
Qty: 30 PATCH | Refills: 0 | Status: SHIPPED | OUTPATIENT
Start: 2024-06-05 | End: 2024-07-05

## 2024-06-06 ENCOUNTER — TELEPHONE (OUTPATIENT)
Dept: FAMILY MEDICINE CLINIC | Age: 59
End: 2024-06-06

## 2024-06-07 RX ORDER — BLOOD-GLUCOSE SENSOR
1 EACH MISCELLANEOUS
Qty: 1 EACH | Refills: 2 | Status: SHIPPED | OUTPATIENT
Start: 2024-06-07

## 2024-06-10 DIAGNOSIS — Z79.4 TYPE 2 DIABETES MELLITUS WITHOUT COMPLICATION, WITH LONG-TERM CURRENT USE OF INSULIN (HCC): Primary | ICD-10-CM

## 2024-06-10 DIAGNOSIS — E11.9 TYPE 2 DIABETES MELLITUS WITHOUT COMPLICATION, WITH LONG-TERM CURRENT USE OF INSULIN (HCC): Primary | ICD-10-CM

## 2024-06-28 ENCOUNTER — HOSPITAL ENCOUNTER (OUTPATIENT)
Age: 59
Discharge: HOME | End: 2024-06-28
Payer: MEDICAID

## 2024-06-28 ENCOUNTER — HOSPITAL ENCOUNTER (EMERGENCY)
Age: 59
Discharge: HOME | End: 2024-06-28
Payer: MEDICAID

## 2024-06-28 VITALS
DIASTOLIC BLOOD PRESSURE: 80 MMHG | SYSTOLIC BLOOD PRESSURE: 111 MMHG | OXYGEN SATURATION: 99 % | BODY MASS INDEX: 24.01 KG/M2 | RESPIRATION RATE: 16 BRPM | WEIGHT: 182 LBS | HEART RATE: 95 BPM | TEMPERATURE: 98.8 F

## 2024-06-28 DIAGNOSIS — Z79.4 TYPE 2 DIABETES MELLITUS WITHOUT COMPLICATION, WITH LONG-TERM CURRENT USE OF INSULIN (HCC): ICD-10-CM

## 2024-06-28 DIAGNOSIS — E11.9 TYPE 2 DIABETES MELLITUS WITHOUT COMPLICATION, WITH LONG-TERM CURRENT USE OF INSULIN (HCC): ICD-10-CM

## 2024-06-28 DIAGNOSIS — K08.89 PAIN, DENTAL: Primary | ICD-10-CM

## 2024-06-28 LAB
ALBUMIN SERPL-MCNC: 4.3 G/DL (ref 3.5–5.2)
ALP SERPL-CCNC: 119 U/L (ref 40–129)
ALT SERPL-CCNC: 14 U/L (ref 0–40)
ANION GAP SERPL CALCULATED.3IONS-SCNC: 10 MMOL/L (ref 7–16)
AST SERPL-CCNC: 15 U/L (ref 0–39)
BILIRUB SERPL-MCNC: 0.8 MG/DL (ref 0–1.2)
BUN SERPL-MCNC: 9 MG/DL (ref 6–20)
CALCIUM SERPL-MCNC: 9.4 MG/DL (ref 8.6–10.2)
CHLORIDE SERPL-SCNC: 107 MMOL/L (ref 98–107)
CO2 SERPL-SCNC: 27 MMOL/L (ref 22–29)
CREAT SERPL-MCNC: 0.9 MG/DL (ref 0.7–1.2)
GFR, ESTIMATED: >90 ML/MIN/1.73M2
GLUCOSE SERPL-MCNC: 190 MG/DL (ref 74–99)
POTASSIUM SERPL-SCNC: 4.1 MMOL/L (ref 3.5–5)
PROT SERPL-MCNC: 7.3 G/DL (ref 6.4–8.3)
SODIUM SERPL-SCNC: 144 MMOL/L (ref 132–146)

## 2024-06-28 PROCEDURE — 80053 COMPREHEN METABOLIC PANEL: CPT

## 2024-06-28 PROCEDURE — 36415 COLL VENOUS BLD VENIPUNCTURE: CPT

## 2024-06-28 PROCEDURE — 99211 OFF/OP EST MAY X REQ PHY/QHP: CPT

## 2024-06-28 RX ORDER — AMOXICILLIN 875 MG/1
875 TABLET, COATED ORAL 2 TIMES DAILY
Qty: 20 TABLET | Refills: 0 | Status: SHIPPED | OUTPATIENT
Start: 2024-06-28 | End: 2024-07-08

## 2024-06-28 NOTE — ED PROVIDER NOTES
[facility] URGENT CARE  EMERGENCY DEPARTMENT ENCOUNTER        NAME: Brayan Olsen  :  1965  MRN:  [id number]  Date of evaluation: 2024  Provider: Bernard Poon PA-C  PCP: Jordan Chavez MD  Note Started : 3:45 PM EDT 24    Chief Complaint: Dental Pain (Lower right, back tooth, dentist after the , broke the tooth and its rotting, started to hurt about 2 days ago)      This is a 59-year-old male that presents to urgent care complaining of right lower rear dental pain for the past several days.  He does have an appointment coming up with his dentist in about 1 week.  No difficulty breathing or swallowing.  On first contact patient he appears to be in no acute distress.        Review of Systems  Pertinent positives and negatives are stated within HPI, all other systems reviewed and are negative.     Allergies: Patient has no known allergies.     --------------------------------------------- PAST HISTORY ---------------------------------------------  Past Medical History:  has a past medical history of Diabetes mellitus (HCC).    Past Surgical History:  has no past surgical history on file.    Social History:  reports that he has never smoked. He has never used smokeless tobacco. He reports that he does not currently use alcohol. He reports that he does not use drugs.    Family History: family history includes Heart Disease in his mother; Other in his father and mother.     The patient’s home medications have been reviewed.    The nursing notes within the ED encounter have been reviewed.     ------------------------------------------------SCREENINGS----------------------------------------------                        CIWA Assessment  BP: 111/80  Pulse: 95           ---------------------------------------------PHYSICAL EXAM --------------------------------------------    Vitals:    24 1527 24 1536   BP:  111/80   Pulse:  95   Resp:  16   Temp:  98.8 °F  Lab notified me that AM cortisol level was 14.8 with a lab reference range of greater than or equal to 19 being normal.      Will order for ACTH stimulation test set for tomorrow AM to determine etiology.     Will defer off starting steroids for testing tomorrow.  If hemodynamically unstable or any worsening clinical status, will start on hydrocortisone.    program.  Efforts were made to edit the dictations but occasionally words are mis-transcribed.)    --------------------------------- IMPRESSION AND DISPOSITION ---------------------------------    IMPRESSION  1. Pain, dental        DISPOSITION Decision To Discharge 06/28/2024 03:52:56 PM    Disposition: Discharge to home  Patient condition is good    I am the Primary Clinician of Record.     Bernard Poon PA-C (electronically signed) on 6/28/2024 at 3:53 PM         Bernard Poon PA-C  06/28/24 1636

## 2024-07-24 DIAGNOSIS — Z79.4 TYPE 2 DIABETES MELLITUS WITHOUT COMPLICATION, WITH LONG-TERM CURRENT USE OF INSULIN (HCC): ICD-10-CM

## 2024-07-24 DIAGNOSIS — E11.9 TYPE 2 DIABETES MELLITUS WITHOUT COMPLICATION, WITH LONG-TERM CURRENT USE OF INSULIN (HCC): ICD-10-CM

## 2024-07-24 RX ORDER — INSULIN LISPRO 100 [IU]/ML
30 INJECTION, SOLUTION INTRAVENOUS; SUBCUTANEOUS
Qty: 27 ML | Refills: 0 | Status: SHIPPED | OUTPATIENT
Start: 2024-07-24 | End: 2024-08-23

## 2024-07-27 ENCOUNTER — HOSPITAL ENCOUNTER (EMERGENCY)
Age: 59
Discharge: HOME | End: 2024-07-27
Payer: MEDICAID

## 2024-07-27 VITALS
OXYGEN SATURATION: 100 % | RESPIRATION RATE: 16 BRPM | SYSTOLIC BLOOD PRESSURE: 133 MMHG | HEART RATE: 85 BPM | HEIGHT: 73 IN | WEIGHT: 183 LBS | DIASTOLIC BLOOD PRESSURE: 83 MMHG | BODY MASS INDEX: 24.25 KG/M2 | TEMPERATURE: 98.6 F

## 2024-07-27 DIAGNOSIS — Z71.1 CONCERN ABOUT STD IN MALE WITHOUT DIAGNOSIS: Primary | ICD-10-CM

## 2024-07-27 LAB
BILIRUB UR QL STRIP: NEGATIVE
CLARITY UR: CLEAR
COLOR UR: YELLOW
COMMENT: ABNORMAL
GLUCOSE UR STRIP-MCNC: >=1000 MG/DL
HGB UR QL STRIP.AUTO: NEGATIVE
KETONES UR STRIP-MCNC: NEGATIVE MG/DL
LEUKOCYTE ESTERASE UR QL STRIP: NEGATIVE
NITRITE UR QL STRIP: NEGATIVE
PH UR STRIP: 6 [PH] (ref 5–9)
PROT UR STRIP-MCNC: NEGATIVE MG/DL
SP GR UR STRIP: 1.02 (ref 1–1.03)
UROBILINOGEN UR STRIP-ACNC: 2 EU/DL (ref 0–1)

## 2024-07-27 PROCEDURE — 99211 OFF/OP EST MAY X REQ PHY/QHP: CPT

## 2024-07-27 PROCEDURE — 6370000000 HC RX 637 (ALT 250 FOR IP): Performed by: NURSE PRACTITIONER

## 2024-07-27 PROCEDURE — 87591 N.GONORRHOEAE DNA AMP PROB: CPT

## 2024-07-27 PROCEDURE — 87491 CHLMYD TRACH DNA AMP PROBE: CPT

## 2024-07-27 PROCEDURE — 81003 URINALYSIS AUTO W/O SCOPE: CPT

## 2024-07-27 RX ORDER — ONDANSETRON 4 MG/1
4 TABLET, ORALLY DISINTEGRATING ORAL ONCE
Status: COMPLETED | OUTPATIENT
Start: 2024-07-27 | End: 2024-07-27

## 2024-07-27 RX ORDER — AZITHROMYCIN 250 MG/1
2000 TABLET, FILM COATED ORAL ONCE
Qty: 8 TABLET | Refills: 0 | Status: SHIPPED | OUTPATIENT
Start: 2024-07-27 | End: 2024-07-27

## 2024-07-27 RX ORDER — METRONIDAZOLE 500 MG/1
500 TABLET ORAL 2 TIMES DAILY
Qty: 14 TABLET | Refills: 0 | Status: SHIPPED | OUTPATIENT
Start: 2024-07-27 | End: 2024-08-03

## 2024-07-27 RX ADMIN — ONDANSETRON 4 MG: 4 TABLET, ORALLY DISINTEGRATING ORAL at 15:33

## 2024-07-27 ASSESSMENT — PAIN - FUNCTIONAL ASSESSMENT: PAIN_FUNCTIONAL_ASSESSMENT: 0-10

## 2024-07-27 ASSESSMENT — PAIN SCALES - GENERAL: PAINLEVEL_OUTOF10: 0

## 2024-07-27 NOTE — ED PROVIDER NOTES
[facility] URGENT CARE  ED  Encounter Note  Admit Date/RoomTime: 7/27/2024  3:09 PM  ED Room: 03/03  HPI:  7/27/24,   Time: 4:18 PM EDT         Brayan Olsen is a 59 y.o. male presenting to the ED for dysuria and concern for STD exposure, beginning few days ago.  The complaint has been persistent, mild in severity, and worsened by nothing.  Presents stating that he has had some dysuria symptoms as well as concern for an STD exposure.  He states his now ex-girlfriend informed him that she tested positive for chlamydia as well as trichomonas.  The last time they were together sexually was 5 days ago.  He is concerned that he has been exposed now and needs to be treated.  He denies any back pain or fever.  He denies any penile discharge but is complaining of some mild dysuria.    ROS:   Pertinent positives and negatives are stated within HPI, all other systems reviewed and are negative.  --------------------------------------------- PAST HISTORY ---------------------------------------------  Past Medical History:  has a past medical history of Diabetes mellitus (HCC).    Past Surgical History:  has no past surgical history on file.    Social History:  reports that he has never smoked. He has never used smokeless tobacco. He reports that he does not currently use alcohol. He reports that he does not use drugs.    Family History: family history includes Heart Disease in his mother; Other in his father and mother.     The patient’s home medications have been reviewed.    Allergies: Patient has no known allergies.    -------------------------------------------------- RESULTS -------------------------------------------------  All laboratory and radiology results have been personally reviewed by myself   LABS:  Results for orders placed or performed during the hospital encounter of 07/27/24   Urinalysis   Result Value Ref Range    Color, UA Yellow Yellow    Turbidity UA Clear Clear    Glucose, Ur >=1000 (A)  NEGATIVE mg/dL    Bilirubin, Urine NEGATIVE NEGATIVE    Ketones, Urine NEGATIVE NEGATIVE mg/dL    Specific Gravity, UA 1.025 1.005 - 1.030    Urine Hgb NEGATIVE NEGATIVE    pH, Urine 6.0 5.0 - 9.0    Protein, UA NEGATIVE NEGATIVE mg/dL    Urobilinogen, Urine 2.0 (H) 0.0 - 1.0 EU/dL    Nitrite, Urine NEGATIVE NEGATIVE    Leukocyte Esterase, Urine NEGATIVE NEGATIVE    Comment       Microscopic exam not performed based on chemical results unless requested in original order.       RADIOLOGY:  Interpreted by Radiologist.  No orders to display       ------------------------- NURSING NOTES AND VITALS REVIEWED ---------------------------   The nursing notes within the ED encounter and vital signs as below have been reviewed.   /83   Pulse 85   Temp 98.6 °F (37 °C)   Resp 16   Ht 1.854 m (6' 1\")   Wt 83 kg (183 lb)   SpO2 100%   BMI 24.14 kg/m²   Oxygen Saturation Interpretation: Normal      ---------------------------------------------------PHYSICAL EXAM--------------------------------------      Constitutional/General: Alert and oriented x3, well appearing, non toxic in NAD  Head: NC/AT  Eyes: PERRL, EOMI  Mouth: Oropharynx clear, handling secretions, no trismus  Neck: Supple, full ROM, no meningeal signs  Pulmonary: Lungs clear to auscultation bilaterally, no wheezes, rales, or rhonchi. Not in respiratory distress  Cardiovascular:  Regular rate and rhythm, no murmurs, gallops, or rubs. 2+ distal pulses  Abdomen: Soft, non tender, non distended,   Extremities: Moves all extremities x 4. Warm and well perfused  Skin: warm and dry without rash  Neurologic: GCS 15,  Psych: Normal Affect      ------------------------------ ED COURSE/MEDICAL DECISION MAKING----------------------  Medications   ondansetron (ZOFRAN-ODT) disintegrating tablet 4 mg (4 mg Oral Given 7/27/24 1947)         Medical Decision Making:     presenting to the ED for dysuria and concern for STD exposure, beginning few days ago.  The complaint has

## 2024-07-30 LAB
CHLAMYDIA DNA UR QL NAA+PROBE: NEGATIVE
N GONORRHOEA DNA UR QL NAA+PROBE: NEGATIVE
SPECIMEN DESCRIPTION: NORMAL

## 2024-08-28 ENCOUNTER — TELEPHONE (OUTPATIENT)
Dept: FAMILY MEDICINE CLINIC | Age: 59
End: 2024-08-28

## 2024-08-28 DIAGNOSIS — Z76.0 MEDICATION REFILL: Primary | ICD-10-CM

## 2024-08-28 DIAGNOSIS — E11.9 TYPE 2 DIABETES MELLITUS WITHOUT COMPLICATION, WITH LONG-TERM CURRENT USE OF INSULIN (HCC): ICD-10-CM

## 2024-08-28 DIAGNOSIS — Z79.4 TYPE 2 DIABETES MELLITUS WITHOUT COMPLICATION, WITH LONG-TERM CURRENT USE OF INSULIN (HCC): ICD-10-CM

## 2024-08-28 RX ORDER — BLOOD-GLUCOSE SENSOR
1 EACH MISCELLANEOUS
Qty: 1 EACH | Refills: 2 | Status: SHIPPED | OUTPATIENT
Start: 2024-08-28

## 2024-08-28 RX ORDER — DULAGLUTIDE 3 MG/.5ML
3 INJECTION, SOLUTION SUBCUTANEOUS WEEKLY
Qty: 7 ADJUSTABLE DOSE PRE-FILLED PEN SYRINGE | Refills: 2 | Status: SHIPPED | OUTPATIENT
Start: 2024-08-28

## 2024-08-28 RX ORDER — ERGOCALCIFEROL 1.25 MG/1
50000 CAPSULE, LIQUID FILLED ORAL WEEKLY
Qty: 5 CAPSULE | Refills: 3 | Status: SHIPPED | OUTPATIENT
Start: 2024-08-28

## 2024-08-28 RX ORDER — INSULIN LISPRO 100 [IU]/ML
30 INJECTION, SOLUTION INTRAVENOUS; SUBCUTANEOUS
Qty: 27 ML | Refills: 0 | Status: SHIPPED | OUTPATIENT
Start: 2024-08-28 | End: 2024-09-27

## 2024-08-28 RX ORDER — DILTIAZEM HYDROCHLORIDE 120 MG/1
120 CAPSULE, COATED, EXTENDED RELEASE ORAL DAILY
Qty: 30 CAPSULE | Refills: 1 | Status: SHIPPED | OUTPATIENT
Start: 2024-08-28

## 2024-08-28 RX ORDER — ATORVASTATIN CALCIUM 20 MG/1
20 TABLET, FILM COATED ORAL DAILY
Qty: 30 TABLET | Refills: 3 | Status: SHIPPED | OUTPATIENT
Start: 2024-08-28

## 2024-08-28 RX ORDER — LISINOPRIL 10 MG/1
10 TABLET ORAL DAILY
Qty: 30 TABLET | Refills: 0 | Status: SHIPPED | OUTPATIENT
Start: 2024-08-28

## 2024-08-28 NOTE — TELEPHONE ENCOUNTER
Pt called to request refills on:    Freestyle Radhames 2 Sensor and Trulicity to [facility]    Humalog  Subutex  Ascorbic Acid  Tadalfil  Ditiazem  Lisinopril  Metformin  Atorvastin  Vit D    Please send these to [facility]

## 2024-09-11 DIAGNOSIS — Z76.0 MEDICATION REFILL: ICD-10-CM

## 2024-09-11 RX ORDER — BLOOD-GLUCOSE SENSOR
1 EACH MISCELLANEOUS
Qty: 1 EACH | Refills: 2 | Status: SHIPPED | OUTPATIENT
Start: 2024-09-11

## 2024-09-19 DIAGNOSIS — Z79.4 TYPE 2 DIABETES MELLITUS WITHOUT COMPLICATION, WITH LONG-TERM CURRENT USE OF INSULIN (HCC): Primary | ICD-10-CM

## 2024-09-19 DIAGNOSIS — E11.9 TYPE 2 DIABETES MELLITUS WITHOUT COMPLICATION, WITH LONG-TERM CURRENT USE OF INSULIN (HCC): Primary | ICD-10-CM

## 2024-09-19 DIAGNOSIS — Z76.0 MEDICATION REFILL: ICD-10-CM

## 2024-09-19 RX ORDER — BLOOD-GLUCOSE SENSOR
1 EACH MISCELLANEOUS
Qty: 1 EACH | Refills: 2 | Status: SHIPPED | OUTPATIENT
Start: 2024-09-19

## 2024-09-19 RX ORDER — KETOROLAC TROMETHAMINE 30 MG/ML
1 INJECTION, SOLUTION INTRAMUSCULAR; INTRAVENOUS DAILY
Qty: 1 EACH | Refills: 2 | Status: SHIPPED | OUTPATIENT
Start: 2024-09-19

## 2024-09-30 DIAGNOSIS — Z79.4 TYPE 2 DIABETES MELLITUS WITHOUT COMPLICATION, WITH LONG-TERM CURRENT USE OF INSULIN (HCC): Primary | ICD-10-CM

## 2024-09-30 DIAGNOSIS — E11.9 TYPE 2 DIABETES MELLITUS WITHOUT COMPLICATION, WITH LONG-TERM CURRENT USE OF INSULIN (HCC): Primary | ICD-10-CM

## 2024-09-30 DIAGNOSIS — Z76.0 MEDICATION REFILL: ICD-10-CM

## 2024-09-30 RX ORDER — BLOOD-GLUCOSE SENSOR
4 EACH MISCELLANEOUS
Qty: 4 EACH | Refills: 2 | Status: SHIPPED | OUTPATIENT
Start: 2024-09-30

## 2024-10-10 DIAGNOSIS — Z79.4 TYPE 2 DIABETES MELLITUS WITHOUT COMPLICATION, WITH LONG-TERM CURRENT USE OF INSULIN (HCC): ICD-10-CM

## 2024-10-10 DIAGNOSIS — Z76.0 MEDICATION REFILL: ICD-10-CM

## 2024-10-10 DIAGNOSIS — E11.9 TYPE 2 DIABETES MELLITUS WITHOUT COMPLICATION, WITH LONG-TERM CURRENT USE OF INSULIN (HCC): ICD-10-CM

## 2024-10-11 RX ORDER — LISINOPRIL 10 MG/1
10 TABLET ORAL DAILY
Qty: 30 TABLET | Refills: 0 | Status: SHIPPED | OUTPATIENT
Start: 2024-10-11

## 2024-11-22 DIAGNOSIS — Z79.4 TYPE 2 DIABETES MELLITUS WITHOUT COMPLICATION, WITH LONG-TERM CURRENT USE OF INSULIN (HCC): ICD-10-CM

## 2024-11-22 DIAGNOSIS — E11.9 TYPE 2 DIABETES MELLITUS WITHOUT COMPLICATION, WITH LONG-TERM CURRENT USE OF INSULIN (HCC): ICD-10-CM

## 2024-11-22 DIAGNOSIS — Z76.0 MEDICATION REFILL: ICD-10-CM

## 2024-11-22 RX ORDER — LISINOPRIL 10 MG/1
10 TABLET ORAL DAILY
Qty: 30 TABLET | Refills: 0 | Status: SHIPPED | OUTPATIENT
Start: 2024-11-22

## 2024-11-22 RX ORDER — DILTIAZEM HYDROCHLORIDE 120 MG/1
120 CAPSULE, COATED, EXTENDED RELEASE ORAL DAILY
Qty: 30 CAPSULE | Refills: 1 | Status: SHIPPED | OUTPATIENT
Start: 2024-11-22

## 2024-11-22 NOTE — TELEPHONE ENCOUNTER
Name of Medication(s) Requested:  Requested Prescriptions     Pending Prescriptions Disp Refills    lisinopril (PRINIVIL;ZESTRIL) 10 MG tablet [Pharmacy Med Name: LISINOPRIL 10 MG TABS 10 Tablet] 30 tablet 0     Sig: TAKE 1 TABLET BY MOUTH DAILY    dilTIAZem (CARDIZEM CD) 120 MG extended release capsule [Pharmacy Med Name: DILTIAZEM er caps 120mg 120 Capsule] 30 capsule 1     Sig: TAKE 1 CAPSULE BY MOUTH DAILY       Medication is on current medication list Yes    Dosage and directions were verified? Yes    Quantity verified: 30 day supply     Pharmacy Verified?  Yes    Last Appointment:  Visit date not found    Future appts:  No future appointments.     (If no appt send self scheduling link. .REFILLAPPT)  Scheduling request sent?     [x] Yes  [] No    Does patient need updated?  [] Yes  [x] No

## 2025-01-24 DIAGNOSIS — Z76.0 MEDICATION REFILL: ICD-10-CM

## 2025-01-27 RX ORDER — DULAGLUTIDE 3 MG/.5ML
3 INJECTION, SOLUTION SUBCUTANEOUS WEEKLY
Qty: 2 ML | Refills: 2 | OUTPATIENT
Start: 2025-01-27

## 2025-01-27 NOTE — TELEPHONE ENCOUNTER
Name of Medication(s) Requested:  Requested Prescriptions     Pending Prescriptions Disp Refills    TRULICITY 3 MG/0.5ML SOAJ [Pharmacy Med Name: TRULICITY 3 MG/0.5ML INJECT 3 Solution Auto-injector] 2 mL 2     Sig: INJECT 3 MG INTO THE SKIN ONCE A WEEK       Medication is on current medication list Yes    Dosage and directions were verified? Yes    Quantity verified: 30 day supply     Pharmacy Verified?  Yes    Last Appointment:  Visit date not found    Future appts:  No future appointments.     (If no appt send self scheduling link. .REFILLAPPT)  Scheduling request sent?     [] Yes  [x] No    Does patient need updated?  [] Yes  [x] No

## 2025-02-04 ENCOUNTER — TELEPHONE (OUTPATIENT)
Dept: FAMILY MEDICINE CLINIC | Age: 60
End: 2025-02-04

## 2025-02-26 ENCOUNTER — TELEPHONE (OUTPATIENT)
Dept: FAMILY MEDICINE CLINIC | Age: 60
End: 2025-02-26

## 2025-02-26 DIAGNOSIS — E11.9 TYPE 2 DIABETES MELLITUS WITHOUT COMPLICATION, WITH LONG-TERM CURRENT USE OF INSULIN (HCC): Primary | ICD-10-CM

## 2025-02-26 DIAGNOSIS — Z79.4 TYPE 2 DIABETES MELLITUS WITHOUT COMPLICATION, WITH LONG-TERM CURRENT USE OF INSULIN (HCC): Primary | ICD-10-CM

## 2025-02-26 RX ORDER — DULAGLUTIDE 3 MG/.5ML
3 INJECTION, SOLUTION SUBCUTANEOUS WEEKLY
Qty: 2 ML | Refills: 0 | Status: SHIPPED | OUTPATIENT
Start: 2025-02-26 | End: 2025-04-17

## 2025-02-26 RX ORDER — DULAGLUTIDE 3 MG/.5ML
3 INJECTION, SOLUTION SUBCUTANEOUS WEEKLY
COMMUNITY
Start: 2024-11-22 | End: 2025-02-26 | Stop reason: SDUPTHER

## 2025-02-26 NOTE — TELEPHONE ENCOUNTER
Cornell called in and is needing his Trulicity refilled, if you could please send into his pharmacy.    Please advise    Thank you

## 2025-03-28 DIAGNOSIS — E11.9 TYPE 2 DIABETES MELLITUS WITHOUT COMPLICATION, WITH LONG-TERM CURRENT USE OF INSULIN: ICD-10-CM

## 2025-03-28 DIAGNOSIS — Z79.4 TYPE 2 DIABETES MELLITUS WITHOUT COMPLICATION, WITH LONG-TERM CURRENT USE OF INSULIN: ICD-10-CM

## 2025-03-28 RX ORDER — DULAGLUTIDE 3 MG/.5ML
3 INJECTION, SOLUTION SUBCUTANEOUS WEEKLY
Qty: 2 ML | Refills: 0 | OUTPATIENT
Start: 2025-03-28 | End: 2025-05-17

## 2025-03-28 NOTE — TELEPHONE ENCOUNTER
Last Appointment:  Visit date not found  Future Appointments   Date Time Provider Department Center   3/31/2025  1:15 PM Jayjay Mejia MD [facility]     Needs Trulicity out today

## 2025-03-29 ENCOUNTER — TELEPHONE (OUTPATIENT)
Dept: FAMILY MEDICINE CLINIC | Age: 60
End: 2025-03-29

## 2025-03-29 DIAGNOSIS — E11.9 TYPE 2 DIABETES MELLITUS WITHOUT COMPLICATION, WITH LONG-TERM CURRENT USE OF INSULIN: ICD-10-CM

## 2025-03-29 DIAGNOSIS — Z79.4 TYPE 2 DIABETES MELLITUS WITHOUT COMPLICATION, WITH LONG-TERM CURRENT USE OF INSULIN: ICD-10-CM

## 2025-03-29 DIAGNOSIS — Z76.0 MEDICATION REFILL: Primary | ICD-10-CM

## 2025-03-29 NOTE — TELEPHONE ENCOUNTER
Patient out of trulicity  Has appt on Monday  Has not been seen in clinic since 5/2025  Told him this is last refill and he said he will be in clinic on Monday to be seen  No other meds he needs at the moment  Patient grateful    Electronically signed by Deneen Scruggs MD on 3/29/2025 at 10:51 AM

## 2025-04-09 ENCOUNTER — TELEPHONE (OUTPATIENT)
Dept: FAMILY MEDICINE CLINIC | Age: 60
End: 2025-04-09

## 2025-04-09 NOTE — TELEPHONE ENCOUNTER
LVM requesting pt to call the office or log into [facility] to establish an appointment with a provider.

## 2025-04-09 NOTE — TELEPHONE ENCOUNTER
----- Message from Dr. Jayjay Mejia MD sent at 4/8/2025  4:15 PM EDT -----  Regarding: Appt. Med refills  Please call and notify patient I will not be prescribing anymore medications until he is seen in office by any resident. He has missed multiple appts. As such he will not be receiving anymore refills until seen by a provider for appropriate follow up.   Thanks

## 2025-05-02 ENCOUNTER — OFFICE VISIT (OUTPATIENT)
Dept: FAMILY MEDICINE CLINIC | Age: 60
End: 2025-05-02
Payer: MEDICAID

## 2025-05-02 VITALS
SYSTOLIC BLOOD PRESSURE: 116 MMHG | OXYGEN SATURATION: 98 % | TEMPERATURE: 98.3 F | DIASTOLIC BLOOD PRESSURE: 78 MMHG | RESPIRATION RATE: 16 BRPM | HEART RATE: 87 BPM

## 2025-05-02 DIAGNOSIS — Z11.4 ENCOUNTER FOR SCREENING FOR HIV: ICD-10-CM

## 2025-05-02 DIAGNOSIS — E11.9 TYPE 2 DIABETES MELLITUS WITHOUT COMPLICATION, WITH LONG-TERM CURRENT USE OF INSULIN (HCC): ICD-10-CM

## 2025-05-02 DIAGNOSIS — I10 PRIMARY HYPERTENSION: ICD-10-CM

## 2025-05-02 DIAGNOSIS — Z76.0 MEDICATION REFILL: ICD-10-CM

## 2025-05-02 DIAGNOSIS — Z79.4 TYPE 2 DIABETES MELLITUS WITHOUT COMPLICATION, WITH LONG-TERM CURRENT USE OF INSULIN (HCC): ICD-10-CM

## 2025-05-02 DIAGNOSIS — I48.0 PAROXYSMAL A-FIB (HCC): Primary | ICD-10-CM

## 2025-05-02 DIAGNOSIS — Z91.89 HISTORY OF SEXUAL INTERCOURSE: ICD-10-CM

## 2025-05-02 PROBLEM — I48.91 ATRIAL FIBRILLATION WITH RVR (HCC): Status: RESOLVED | Noted: 2020-12-03 | Resolved: 2025-05-02

## 2025-05-02 PROBLEM — G89.29 CHRONIC PAIN: Status: ACTIVE | Noted: 2025-05-02

## 2025-05-02 LAB — HBA1C MFR BLD: 12.4 %

## 2025-05-02 PROCEDURE — G8428 CUR MEDS NOT DOCUMENT: HCPCS

## 2025-05-02 PROCEDURE — 1036F TOBACCO NON-USER: CPT

## 2025-05-02 PROCEDURE — 3017F COLORECTAL CA SCREEN DOC REV: CPT

## 2025-05-02 PROCEDURE — 3078F DIAST BP <80 MM HG: CPT

## 2025-05-02 PROCEDURE — 2022F DILAT RTA XM EVC RTNOPTHY: CPT

## 2025-05-02 PROCEDURE — G8420 CALC BMI NORM PARAMETERS: HCPCS

## 2025-05-02 PROCEDURE — 99213 OFFICE O/P EST LOW 20 MIN: CPT

## 2025-05-02 PROCEDURE — 3074F SYST BP LT 130 MM HG: CPT

## 2025-05-02 PROCEDURE — 3046F HEMOGLOBIN A1C LEVEL >9.0%: CPT

## 2025-05-02 PROCEDURE — 83036 HEMOGLOBIN GLYCOSYLATED A1C: CPT

## 2025-05-02 RX ORDER — ASPIRIN 81 MG/1
81 TABLET ORAL DAILY
Qty: 90 TABLET | Refills: 1 | Status: SHIPPED | OUTPATIENT
Start: 2025-05-02

## 2025-05-02 RX ORDER — INSULIN GLARGINE 100 [IU]/ML
30 INJECTION, SOLUTION SUBCUTANEOUS NIGHTLY
Qty: 5 ADJUSTABLE DOSE PRE-FILLED PEN SYRINGE | Refills: 3 | Status: SHIPPED | OUTPATIENT
Start: 2025-05-02

## 2025-05-02 SDOH — ECONOMIC STABILITY: FOOD INSECURITY: WITHIN THE PAST 12 MONTHS, THE FOOD YOU BOUGHT JUST DIDN'T LAST AND YOU DIDN'T HAVE MONEY TO GET MORE.: NEVER TRUE

## 2025-05-02 SDOH — ECONOMIC STABILITY: FOOD INSECURITY: WITHIN THE PAST 12 MONTHS, YOU WORRIED THAT YOUR FOOD WOULD RUN OUT BEFORE YOU GOT MONEY TO BUY MORE.: NEVER TRUE

## 2025-05-02 ASSESSMENT — PATIENT HEALTH QUESTIONNAIRE - PHQ9
2. FEELING DOWN, DEPRESSED OR HOPELESS: NOT AT ALL
SUM OF ALL RESPONSES TO PHQ QUESTIONS 1-9: 0
SUM OF ALL RESPONSES TO PHQ QUESTIONS 1-9: 0
1. LITTLE INTEREST OR PLEASURE IN DOING THINGS: NOT AT ALL
SUM OF ALL RESPONSES TO PHQ QUESTIONS 1-9: 0
SUM OF ALL RESPONSES TO PHQ QUESTIONS 1-9: 0

## 2025-05-02 ASSESSMENT — LIFESTYLE VARIABLES
HOW MANY STANDARD DRINKS CONTAINING ALCOHOL DO YOU HAVE ON A TYPICAL DAY: PATIENT DOES NOT DRINK
HOW OFTEN DO YOU HAVE A DRINK CONTAINING ALCOHOL: NEVER

## 2025-05-02 NOTE — PROGRESS NOTES
S: 59 y.o. male here for DM. Trulicity 3, lispro 30 tid.   Hemoglobin A1C   Date Value Ref Range Status   05/02/2025 12.4 % Final   The 10-year ASCVD risk score (Enrique BERGERON, et al., 2019) is: 11.9%    Values used to calculate the score:      Age: 59 years      Sex: Male      Is Non- : Yes      Diabetic: Yes      Tobacco smoker: No      Systolic Blood Pressure: 116 mmHg      Is BP treated: No      HDL Cholesterol: 45 mg/dL      Total Cholesterol: 186 mg/dL   PAF. Ran [id number] and hasbled.     O: VS: /78   Pulse 87   Temp 98.3 °F (36.8 °C) (Temporal)   Resp 16   SpO2 98%    General: NAD, alert and interacting appropriately.    CV:  RRR, no gallops, rubs, or murmurs    Resp: CTAB   Abd:  Soft, nontender   Ext:  No edema    Impression: DM. Dyslipidemia. PAF.   Plan:   Increase trulicity. Switch to lantus 30  Add asa  Rtc 1 mo     Attending Physician Statement  I have discussed the case, including pertinent history and exam findings with the resident.  I agree with the documented assessment and plan.

## 2025-05-02 NOTE — PROGRESS NOTES
[facility]  FAMILY MEDICINE RESIDENCY PROGRAM  DATE OF VISIT : 2025    Patient : Brayan Olsen   Age : 59 y.o.    : 1965   MRN : [id number]   ______________________________________________________________________    Chief Complaint:   Chief Complaint   Patient presents with    Diabetes    Medication Refill       HPI:   History obtained from the patient. Brayan Olsen is a 59 y.o. male who presents to establish w/new PCP. Patient has been lost to follow-up by our clinic.     Atrial Fibrillation: Patient is currently on Diltiazem 120mg. Admits to taking medication sporadically. He is not on anticoagulation. He previously saw cardiology in . ECHO in 2020 demonstrated EF 61%. No hx of artificial valves or antiphospholipid antibody syndrome. States he went into Afib 2/2 COVID. Denies tobacco use, dizziness, chest pain, palpitations, syncope, SOB, hematochezia, melena, hemoptysis, hematemesis or bleeding disorder.     IDDM: Poorly controlled. A1C 14.5. Not taking Metformin. Patient is currently on Trulicity 3mg and Lispro 30units TID. Denies hypoglycemia.     HTN: Currently on Lisinopril 10mg and Cardizem 120mg. Admits to taking medication sporadically. Denies headaches, visual disturbances, peripheral edema, palpitations or chest pain.       Review of Systems:  Relevant as mentioned in HPI  ______________________________________________________________________    Physical Exam:    Vitals: /78   Pulse 87   Temp 98.3 °F (36.8 °C) (Temporal)   Resp 16   SpO2 98%   Physical Exam  Vitals and nursing note reviewed.   Constitutional:       General: He is not in acute distress.     Appearance: He is not ill-appearing, toxic-appearing or diaphoretic.   Cardiovascular:      Rate and Rhythm: Normal rate and regular rhythm.      Heart sounds: Normal heart sounds.   Pulmonary:      Effort: Pulmonary effort is normal.      Breath sounds: Normal breath sounds. No wheezing, rhonchi or rales.

## 2025-05-02 NOTE — PATIENT INSTRUCTIONS
Your Trulicity was increased to 4.5mg weekly today   You were started on Lantus 30 units nightly today  Stop taking Lispro insulin (No more mealtime insulin)  You were started on aspirin today     Check your sugars/glucose 3-5 times daily in the morning, around mealtimes, before bed - Bring your Freestyle with you next time you come for an appointment    Bring all your medications with you next time you come in     Come in Monday to get labs done

## 2025-05-05 DIAGNOSIS — E11.9 TYPE 2 DIABETES MELLITUS WITHOUT COMPLICATION, WITH LONG-TERM CURRENT USE OF INSULIN (HCC): ICD-10-CM

## 2025-05-05 DIAGNOSIS — Z76.0 MEDICATION REFILL: ICD-10-CM

## 2025-05-05 DIAGNOSIS — Z79.4 TYPE 2 DIABETES MELLITUS WITHOUT COMPLICATION, WITH LONG-TERM CURRENT USE OF INSULIN (HCC): ICD-10-CM

## 2025-05-05 DIAGNOSIS — I10 PRIMARY HYPERTENSION: ICD-10-CM

## 2025-05-05 RX ORDER — INSULIN GLARGINE 100 [IU]/ML
30 INJECTION, SOLUTION SUBCUTANEOUS NIGHTLY
Qty: 5 ADJUSTABLE DOSE PRE-FILLED PEN SYRINGE | Refills: 3 | OUTPATIENT
Start: 2025-05-05

## 2025-05-05 RX ORDER — LISINOPRIL 5 MG/1
5 TABLET ORAL DAILY
Qty: 60 TABLET | Refills: 0 | Status: SHIPPED | OUTPATIENT
Start: 2025-05-05

## 2025-05-05 RX ORDER — ATORVASTATIN CALCIUM 20 MG/1
20 TABLET, FILM COATED ORAL NIGHTLY
Qty: 90 TABLET | Refills: 0 | Status: SHIPPED | OUTPATIENT
Start: 2025-05-05

## 2025-05-05 RX ORDER — TADALAFIL 10 MG/1
10 TABLET ORAL DAILY PRN
Qty: 30 TABLET | Refills: 1 | OUTPATIENT
Start: 2025-05-05

## 2025-05-05 RX ORDER — ERGOCALCIFEROL 1.25 MG/1
50000 CAPSULE, LIQUID FILLED ORAL WEEKLY
Qty: 5 CAPSULE | Refills: 3 | OUTPATIENT
Start: 2025-05-05

## 2025-05-05 RX ORDER — ACYCLOVIR 800 MG/1
TABLET ORAL
Qty: 2 EACH | Refills: 5 | Status: SHIPPED | OUTPATIENT
Start: 2025-05-05

## 2025-05-05 RX ORDER — DILTIAZEM HYDROCHLORIDE 120 MG/1
120 CAPSULE, COATED, EXTENDED RELEASE ORAL DAILY
Qty: 90 CAPSULE | Refills: 0 | Status: SHIPPED | OUTPATIENT
Start: 2025-05-05

## 2025-05-05 NOTE — TELEPHONE ENCOUNTER
Went to the wrong pharmacy or was not called in. Please refill per patient request.   Last Appointment:  5/2/2025  Future Appointments   Date Time Provider Department Center   6/3/2025  2:30 PM Jayjay Mejia MD [facility] [place] [facility] DE[facility]

## 2025-06-02 DIAGNOSIS — Z76.0 MEDICATION REFILL: ICD-10-CM

## 2025-06-02 NOTE — TELEPHONE ENCOUNTER
Name of Medication(s) Requested:  Requested Prescriptions     Pending Prescriptions Disp Refills    tadalafil (CIALIS) 10 MG tablet 30 tablet 1     Sig: Take 1 tablet by mouth daily as needed for Erectile Dysfunction    vitamin D (ERGOCALCIFEROL) 1.25 MG (30073 UT) CAPS capsule 5 capsule 3     Sig: Take 1 capsule by mouth once a week     Patient called with medication refill request. Reports he has been taking these medications but were not ordered at last visit. Visit rescheduled to 6-12-25  Medication is on current medication list Yes    Dosage and directions were verified? Yes    Quantity verified: 30 day supply     Pharmacy Verified?  Yes    Last Appointment:  5/2/2025    Future appts:  Future Appointments   Date Time Provider Department Center   6/12/2025  3:15 PM Kya Francis DO [facility] [place] [facility] DE[facility]        (If no appt send self scheduling link. .REFILLAPPT)  Scheduling request sent?     [] Yes  [x] No    Does patient need updated?  [] Yes  [x] No

## 2025-06-02 NOTE — TELEPHONE ENCOUNTER
Name of Medication(s) Requested:  Requested Prescriptions      No prescriptions requested or ordered in this encounter       Medication is on current medication list Yes    Dosage and directions were verified? Yes    Quantity verified: 30 day supply     Pharmacy Verified?  Yes    Last Appointment:  5/2/2025    Future appts:  Future Appointments   Date Time Provider Department Center   6/12/2025  3:15 PM [facility]        (If no appt send self scheduling link. .REFILLAPPT)  Scheduling request sent?     [] Yes  [x] No    Does patient need updated?  [] Yes  [x] No

## 2025-06-03 RX ORDER — TADALAFIL 10 MG/1
10 TABLET ORAL DAILY PRN
Qty: 30 TABLET | Refills: 1 | OUTPATIENT
Start: 2025-06-03

## 2025-06-03 RX ORDER — ERGOCALCIFEROL 1.25 MG/1
50000 CAPSULE, LIQUID FILLED ORAL WEEKLY
Qty: 5 CAPSULE | Refills: 3 | OUTPATIENT
Start: 2025-06-03

## 2025-06-11 NOTE — PROGRESS NOTES
[facility]  FAMILY MEDICINE RESIDENCY PROGRAM  DATE OF VISIT : 2025    Patient : Brayan Olsen   Age : 60 y.o.    : 1965   MRN : [id number]   ______________________________________________________________________    Chief Complaint:   Chief Complaint   Patient presents with    Diabetes    Medication Refill       HPI:   History obtained from the patient. Brayan Olsen is a 60 y.o. male who presents to follow-up for chronic medical conditions.     IDDM: Poorly controlled. A1C 12.4. Last visit started on Lantus 30U and Trulicity increased to 4.5mg weekly. States his glucose is much improved running low 200s. Did not complete workup for type 1 DM. Using Freestyle CGM - Did not bring today. Denies hypoglycemia, polyuria, polydipsia or polyphagia.     HLD: On Atorvastatin 20mg. Denies myalgias, hx of MI or CVA.     Erectile dysfunction: Previously on Cialis 10mg prn which worked well for him. Endorsing ED. Able to achieve but not maintain erection. No PSA on file. Poorly controlled IDDM. Denies family hx of prostate cancer, hematuria, dysuria, urinary urgency, nocturia, dizziness, syncope or weak urine stream.       Review of Systems:  Relevant as mentioned in HPI  ______________________________________________________________________    Physical Exam:    Vitals: /82   Pulse 85   Temp 98.2 °F (36.8 °C) (Temporal)   Resp 14   Ht 1.854 m (6' 1\")   Wt 79.8 kg (176 lb)   SpO2 97%   BMI 23.22 kg/m²   Physical Exam  Vitals and nursing note reviewed.   Constitutional:       General: He is not in acute distress.     Appearance: He is not ill-appearing, toxic-appearing or diaphoretic.   Cardiovascular:      Rate and Rhythm: Normal rate and regular rhythm.      Heart sounds: Normal heart sounds.   Pulmonary:      Effort: Pulmonary effort is normal.      Breath sounds: Normal breath sounds. No wheezing, rhonchi or rales.   Abdominal:      General: Bowel sounds are normal.      Palpations:

## 2025-06-12 ENCOUNTER — OFFICE VISIT (OUTPATIENT)
Dept: FAMILY MEDICINE CLINIC | Age: 60
End: 2025-06-12

## 2025-06-12 VITALS
HEART RATE: 85 BPM | HEIGHT: 73 IN | TEMPERATURE: 98.2 F | WEIGHT: 176 LBS | OXYGEN SATURATION: 97 % | RESPIRATION RATE: 14 BRPM | BODY MASS INDEX: 23.33 KG/M2 | SYSTOLIC BLOOD PRESSURE: 124 MMHG | DIASTOLIC BLOOD PRESSURE: 82 MMHG

## 2025-06-12 DIAGNOSIS — E11.9 TYPE 2 DIABETES MELLITUS WITHOUT COMPLICATION, WITH LONG-TERM CURRENT USE OF INSULIN (HCC): Primary | ICD-10-CM

## 2025-06-12 DIAGNOSIS — Z91.89 HISTORY OF SEXUAL INTERCOURSE: ICD-10-CM

## 2025-06-12 DIAGNOSIS — N52.9 ERECTILE DYSFUNCTION, UNSPECIFIED ERECTILE DYSFUNCTION TYPE: ICD-10-CM

## 2025-06-12 DIAGNOSIS — E78.2 MIXED HYPERLIPIDEMIA: ICD-10-CM

## 2025-06-12 DIAGNOSIS — Z79.4 TYPE 2 DIABETES MELLITUS WITHOUT COMPLICATION, WITH LONG-TERM CURRENT USE OF INSULIN (HCC): Primary | ICD-10-CM

## 2025-06-12 DIAGNOSIS — E55.9 VITAMIN D INSUFFICIENCY: ICD-10-CM

## 2025-06-12 DIAGNOSIS — Z11.4 ENCOUNTER FOR SCREENING FOR HIV: ICD-10-CM

## 2025-06-12 RX ORDER — INSULIN GLARGINE 100 [IU]/ML
35 INJECTION, SOLUTION SUBCUTANEOUS NIGHTLY
Qty: 5 ADJUSTABLE DOSE PRE-FILLED PEN SYRINGE | Refills: 0 | Status: SHIPPED | OUTPATIENT
Start: 2025-06-12

## 2025-06-12 RX ORDER — TADALAFIL 10 MG/1
10 TABLET ORAL DAILY PRN
Qty: 30 TABLET | Refills: 0 | Status: SHIPPED | OUTPATIENT
Start: 2025-06-12 | End: 2025-07-12

## 2025-06-12 RX ORDER — METFORMIN HYDROCHLORIDE 500 MG/1
500 TABLET, EXTENDED RELEASE ORAL 2 TIMES DAILY WITH MEALS
Qty: 180 TABLET | Refills: 0 | Status: SHIPPED | OUTPATIENT
Start: 2025-06-12 | End: 2025-09-10

## 2025-06-12 NOTE — PROGRESS NOTES
Patient is a 60-year-old male who presents to follow-up for poorly controlled insulin-dependent diabetes, hyperlipidemia and erectile dysfunction.    Insulin-dependent diabetes: A1c 12.4.  Last visit started on Lantus 30 units and Trulicity was increased to 4.5 mg weekly.  States he is doing well on current regimen and denies any episodes of hypoglycemia.  Although freestyle not available during visit today patient states his glucose is running average in the mid 200s.  Does not follow with ophthalmology.    Hyperlipidemia: On atorvastatin 20 mg daily.  Denies myalgias, history of MI or CVA.    Erectile dysfunction: Previously on Cialis 10 mg as needed which was working.  Endorsing only erectile dysfunction.  No PSA on file.  Does have a history of poorly controlled diabetes.  No family history of prostate cancer, hematuria, dysuria, urinary urgency, nocturia, dizziness, syncope or weak urine stream.    Blood pressure 124/82, pulse 85, temperature 98.2 °F (36.8 °C), temperature source Temporal, resp. rate 14, height 1.854 m (6' 1\"), weight 79.8 kg (176 lb), SpO2 97%.  HEENT WNL     Heart regular    Lungs clear    abd non-tender      No edema    Pulses intact     Plan:  Insulin-dependent diabetes: Continue Trulicity at current dose.  Increase Lantus to 35 units nightly.  Advised to bring CGM with him to next visit.  Ophthalmology referral placed.  Declined diabetic education.  Advised on carb counting.  Labs placed to rule out type 1 diabetes.  Start patient on metformin 500 mg twice daily.  Urine microalbumin pending.  Hyperlipidemia: Lipid panel pending.  Erectile dysfunction: Resume Cialis.  If PSA is elevated consider urology referral.    Attending Physician Statement  I have discussed the case, including pertinent history and exam findings with the resident. I agree with the documented assessment and plan.

## 2025-06-18 PROBLEM — E78.2 MIXED HYPERLIPIDEMIA: Status: ACTIVE | Noted: 2025-06-18

## 2025-06-18 PROBLEM — E55.9 VITAMIN D INSUFFICIENCY: Status: ACTIVE | Noted: 2025-06-18

## 2025-06-18 PROBLEM — G89.29 CHRONIC PAIN: Status: RESOLVED | Noted: 2025-05-02 | Resolved: 2025-06-18

## 2025-06-18 PROBLEM — N52.9 ERECTILE DYSFUNCTION: Status: ACTIVE | Noted: 2025-06-18

## 2025-07-18 DIAGNOSIS — N52.9 ERECTILE DYSFUNCTION, UNSPECIFIED ERECTILE DYSFUNCTION TYPE: ICD-10-CM

## 2025-07-18 RX ORDER — TADALAFIL 10 MG/1
TABLET ORAL
Qty: 30 TABLET | Refills: 0 | Status: SHIPPED | OUTPATIENT
Start: 2025-07-18

## 2025-07-18 NOTE — TELEPHONE ENCOUNTER
Name of Medication(s) Requested:  Requested Prescriptions     Pending Prescriptions Disp Refills    tadalafil (CIALIS) 10 MG tablet [Pharmacy Med Name: TADALAFIL 10 MG TABS 10 Tablet] 30 tablet 0     Sig: TAKE 1 TABLET DAILY AS NEEDED FOR ERECTILE DYSFUNCTION 30 MINUTES BEFORE ANTICIPATED SEXUAL ACTIVITY       Medication is on current medication list Yes    Dosage and directions were verified? Yes    Quantity verified: 30 day supply     Pharmacy Verified?  Yes    Last Appointment:  6/12/2025    Future appts:  Future Appointments   Date Time Provider Department Center   7/29/2025  1:30 PM Emiliana Sidhu MD [facility] [place] [facility]   9/18/2025  1:00 PM Mary Kate Rees MD [place] [facility]        (If no appt send self scheduling link. .REFILLAPPT)  Scheduling request sent?     [] Yes  [x] No    Does patient need updated?  [] Yes  [x] No

## 2025-07-28 NOTE — PROGRESS NOTES
[facility]  FAMILY MEDICINE RESIDENCY PROGRAM  DATE OF VISIT : 2025    Patient : Brayan Olsen   Age : 60 y.o.    : 1965   MRN : [id number]   ______________________________________________________________________    Chief Complaint:   Chief Complaint   Patient presents with    Follow-up    Other     Patient would like labs done.  Patient stated he needs a script for weight gain drinks.       HPI:   History obtained from the patient. Brayan Olsen is a 60 y.o. male with history of Paroxysmal A-Fib, T2DM, HLD, and HTN who presents to clinic today in order to re-establish care with new provider.    Paroxysmal A-Fib - He is currently on Diltiazem 120 mg and Aspirin 81 mg daily.  Lab work was ordered by PCP last visit, but patient did not obtain it yet.  He was referred to Cardiology last visit and has an appointment scheduled with Dr. Rees on 2025.  [id number] score is 2.    T2DM - Last A1C obtained in 2025 was 12.4.  He is currently on Metformin 500 mg BID but he doesn't want to take it anymore due to GI side effects. He is also on Lantus 35 units nightly, and Trulicity 4.5 mg weekly.  He is compliant with his medications with no missed doses.  He reports some nausea from the Trulicity.  Lab work was ordered by PCP last visit, but patient did not obtain it yet.  He checks his BG levels using the CGM, but his machine broke a week ago. He called the company, but they haven't gotten back to him just yet.  Fasting BG levels have been ranging in 190's and random BG levels have been ranging in 150 - 200. He denies any symptoms of hypoglycemia or hyperglycemia.    HLD - Last lipid panel was obtained in 2023 remarkable for elevated LDL levels of 160.  He is currently on Lipitor 20 mg nightly.  He is compliant with his medication with no missed doses.    HTN - He is currently on Lisinopril 5 mg daily.  He is compliant with his medication with no missed doses.  BP in the office today is

## 2025-07-29 ENCOUNTER — OFFICE VISIT (OUTPATIENT)
Dept: FAMILY MEDICINE CLINIC | Age: 60
End: 2025-07-29
Payer: MEDICAID

## 2025-07-29 VITALS
OXYGEN SATURATION: 98 % | HEART RATE: 83 BPM | RESPIRATION RATE: 18 BRPM | SYSTOLIC BLOOD PRESSURE: 138 MMHG | BODY MASS INDEX: 23.06 KG/M2 | WEIGHT: 174 LBS | TEMPERATURE: 98.2 F | DIASTOLIC BLOOD PRESSURE: 79 MMHG | HEIGHT: 73 IN

## 2025-07-29 DIAGNOSIS — Z79.4 TYPE 2 DIABETES MELLITUS WITHOUT COMPLICATION, WITH LONG-TERM CURRENT USE OF INSULIN (HCC): ICD-10-CM

## 2025-07-29 DIAGNOSIS — Z76.0 MEDICATION REFILL: ICD-10-CM

## 2025-07-29 DIAGNOSIS — I10 PRIMARY HYPERTENSION: ICD-10-CM

## 2025-07-29 DIAGNOSIS — Z11.4 ENCOUNTER FOR SCREENING FOR HIV: ICD-10-CM

## 2025-07-29 DIAGNOSIS — T88.7XXA MEDICATION SIDE EFFECT: ICD-10-CM

## 2025-07-29 DIAGNOSIS — N52.9 ERECTILE DYSFUNCTION, UNSPECIFIED ERECTILE DYSFUNCTION TYPE: ICD-10-CM

## 2025-07-29 DIAGNOSIS — E55.9 VITAMIN D INSUFFICIENCY: ICD-10-CM

## 2025-07-29 DIAGNOSIS — Z11.59 NEED FOR HEPATITIS C SCREENING TEST: ICD-10-CM

## 2025-07-29 DIAGNOSIS — E11.9 TYPE 2 DIABETES MELLITUS WITHOUT COMPLICATION, WITH LONG-TERM CURRENT USE OF INSULIN (HCC): ICD-10-CM

## 2025-07-29 DIAGNOSIS — R11.0 NAUSEA: ICD-10-CM

## 2025-07-29 DIAGNOSIS — E78.2 MIXED HYPERLIPIDEMIA: ICD-10-CM

## 2025-07-29 DIAGNOSIS — I48.0 PAROXYSMAL A-FIB (HCC): ICD-10-CM

## 2025-07-29 DIAGNOSIS — Z91.89 HISTORY OF SEXUAL INTERCOURSE: ICD-10-CM

## 2025-07-29 DIAGNOSIS — Z76.89 ENCOUNTER TO ESTABLISH CARE WITH NEW PROVIDER: Primary | ICD-10-CM

## 2025-07-29 LAB
ALBUMIN: 4.2 G/DL (ref 3.5–5.2)
ALP BLD-CCNC: 141 U/L (ref 40–129)
ALT SERPL-CCNC: 17 U/L (ref 0–50)
ANION GAP SERPL CALCULATED.3IONS-SCNC: 12 MMOL/L (ref 7–16)
AST SERPL-CCNC: 18 U/L (ref 0–50)
BASOPHILS ABSOLUTE: 0.03 K/UL (ref 0–0.2)
BASOPHILS RELATIVE PERCENT: 1 % (ref 0–2)
BILIRUB SERPL-MCNC: 0.7 MG/DL (ref 0–1.2)
BUN BLDV-MCNC: 7 MG/DL (ref 6–20)
CALCIUM SERPL-MCNC: 9.4 MG/DL (ref 8.6–10)
CHLORIDE BLD-SCNC: 105 MMOL/L (ref 98–107)
CHOLESTEROL, TOTAL: 174 MG/DL
CO2: 25 MMOL/L (ref 22–29)
CREAT SERPL-MCNC: 0.8 MG/DL (ref 0.7–1.2)
CREATININE URINE: 209 MG/DL (ref 40–278)
EOSINOPHILS ABSOLUTE: 0.16 K/UL (ref 0.05–0.5)
EOSINOPHILS RELATIVE PERCENT: 4 % (ref 0–6)
GFR, ESTIMATED: >90 ML/MIN/1.73M2
GLUCOSE BLD-MCNC: 180 MG/DL (ref 74–99)
HCT VFR BLD CALC: 42.9 % (ref 37–54)
HDLC SERPL-MCNC: 50 MG/DL
HEMOGLOBIN: 14.7 G/DL (ref 12.5–16.5)
HEPATITIS C ANTIBODY: NONREACTIVE
HIV AG/AB: NONREACTIVE
IMMATURE GRANULOCYTES %: 0 % (ref 0–5)
IMMATURE GRANULOCYTES ABSOLUTE: <0.03 K/UL (ref 0–0.58)
LDL CHOLESTEROL: 110 MG/DL
LYMPHOCYTES ABSOLUTE: 1.21 K/UL (ref 1.5–4)
LYMPHOCYTES RELATIVE PERCENT: 30 % (ref 20–42)
MCH RBC QN AUTO: 29.6 PG (ref 26–35)
MCHC RBC AUTO-ENTMCNC: 34.3 G/DL (ref 32–34.5)
MCV RBC AUTO: 86.3 FL (ref 80–99.9)
MICROALBUMIN/CREAT 24H UR: 14 MG/L (ref 0–20)
MICROALBUMIN/CREAT UR-RTO: 7 MCG/MG CREAT (ref 0–30)
MONOCYTES ABSOLUTE: 0.22 K/UL (ref 0.1–0.95)
MONOCYTES RELATIVE PERCENT: 6 % (ref 2–12)
NEUTROPHILS ABSOLUTE: 2.35 K/UL (ref 1.8–7.3)
NEUTROPHILS RELATIVE PERCENT: 59 % (ref 43–80)
PDW BLD-RTO: 13 % (ref 11.5–15)
PLATELET # BLD: 195 K/UL (ref 130–450)
PMV BLD AUTO: 11.3 FL (ref 7–12)
POTASSIUM SERPL-SCNC: 4 MMOL/L (ref 3.5–5.1)
PROSTATE SPECIFIC ANTIGEN: 1.26 NG/ML (ref 0–4)
RBC # BLD: 4.97 M/UL (ref 3.8–5.8)
SODIUM BLD-SCNC: 142 MMOL/L (ref 136–145)
TOTAL PROTEIN: 7.1 G/DL (ref 6.4–8.3)
TRIGL SERPL-MCNC: 71 MG/DL
VITAMIN D 25-HYDROXY: 32.1 NG/ML (ref 30–100)
VLDLC SERPL CALC-MCNC: 14 MG/DL
WBC # BLD: 4 K/UL (ref 4.5–11.5)

## 2025-07-29 PROCEDURE — G8420 CALC BMI NORM PARAMETERS: HCPCS

## 2025-07-29 PROCEDURE — G8428 CUR MEDS NOT DOCUMENT: HCPCS

## 2025-07-29 PROCEDURE — 3017F COLORECTAL CA SCREEN DOC REV: CPT

## 2025-07-29 PROCEDURE — 2022F DILAT RTA XM EVC RTNOPTHY: CPT

## 2025-07-29 PROCEDURE — 1036F TOBACCO NON-USER: CPT

## 2025-07-29 PROCEDURE — 3078F DIAST BP <80 MM HG: CPT

## 2025-07-29 PROCEDURE — 3075F SYST BP GE 130 - 139MM HG: CPT

## 2025-07-29 PROCEDURE — 99213 OFFICE O/P EST LOW 20 MIN: CPT

## 2025-07-29 PROCEDURE — 3046F HEMOGLOBIN A1C LEVEL >9.0%: CPT

## 2025-07-29 RX ORDER — INSULIN GLARGINE 100 [IU]/ML
40 INJECTION, SOLUTION SUBCUTANEOUS NIGHTLY
Qty: 5 ADJUSTABLE DOSE PRE-FILLED PEN SYRINGE | Refills: 0 | Status: SHIPPED | OUTPATIENT
Start: 2025-07-29

## 2025-07-29 NOTE — PROGRESS NOTES
This is a 60-year-old male who presents to clinic today in order to reestablish care with a new provider    Paroxysmal G-dww-nxcoesbpt on diltiazem 120 mg and aspirin 81 daily.  [id number]-[id number] score is 2.  He was referred to cardiology last visit and has an appointment scheduled with Dr. Rees on 9/18.  He is asymptomatic today.    T2DM-last A1c obtained in 5/2025 was 12.4.  He is currently on metformin 500 mg twice daily but would like to stop it due to GI intolerance.  He is also on Lantus 35 units nightly and Trulicity 4.5 mg weekly.  Reports to some nausea since his Trulicity was increased last visit.  He checks his BG levels using the CGM but his machine broke a week ago.  He called the company but they have not gotten back to him just yet.  He has been checking his BG levels using lancets.  Fasting BG levels have been ranging in the 190s and random have been ranging 150s to 200s.  He denied any symptoms of hypo or hyperglycemia    HLD-last lipid panel obtained in 8/2023 was remarkable for LDL levels of 160.  He is on Lipitor 20 mg nightly and compliant with his medication.    HTN-lisinopril 5 mg daily currently.  BP in the office is 138/79.  He is asymptomatic today.    Erectile dysfunction-he is currently on Cialis 10 mg as needed.  He denies hematuria, dysuria, urinary urgency, nocturia, dizziness, syncope, or weak urinary stream.  He has a history of poorly controlled insulin-dependent diabetes with no family history of prostate cancer.    Blood pressure 138/79, pulse 83, temperature 98.2 °F (36.8 °C), temperature source Temporal, resp. rate 18, height 1.854 m (6' 1\"), weight 78.9 kg (174 lb), SpO2 98%.    HEENT WNL     Heart regular    Lungs clear    abd non-tender      No edema    Pulses intact       Assessment and plan  1) paroxysmal A-fib-in normal sinus rhythm today.  Continue diltiazem and aspirin.  Will await cardiology appointment in 9/18.  2) T2DM -poorly controlled A1c and glycemic index.  Will

## 2025-07-30 RX ORDER — ONDANSETRON 4 MG/1
4 TABLET, ORALLY DISINTEGRATING ORAL 3 TIMES DAILY PRN
Qty: 21 TABLET | Refills: 0 | Status: SHIPPED | OUTPATIENT
Start: 2025-07-30

## 2025-07-30 RX ORDER — ATORVASTATIN CALCIUM 40 MG/1
40 TABLET, FILM COATED ORAL NIGHTLY
Qty: 90 TABLET | Refills: 0 | Status: SHIPPED | OUTPATIENT
Start: 2025-07-30

## 2025-07-31 ENCOUNTER — HOSPITAL ENCOUNTER (EMERGENCY)
Age: 60
Discharge: HOME | End: 2025-07-31
Payer: MEDICAID

## 2025-07-31 VITALS
BODY MASS INDEX: 23.48 KG/M2 | TEMPERATURE: 98.6 F | WEIGHT: 178 LBS | RESPIRATION RATE: 18 BRPM | SYSTOLIC BLOOD PRESSURE: 130 MMHG | OXYGEN SATURATION: 99 % | HEART RATE: 91 BPM | DIASTOLIC BLOOD PRESSURE: 99 MMHG

## 2025-07-31 DIAGNOSIS — Z20.2 EXPOSURE TO CHLAMYDIA: ICD-10-CM

## 2025-07-31 DIAGNOSIS — K04.7 DENTAL INFECTION: ICD-10-CM

## 2025-07-31 DIAGNOSIS — Z20.2 EXPOSURE TO TRICHOMONAS: Primary | ICD-10-CM

## 2025-07-31 LAB
BACTERIA URNS QL MICRO: ABNORMAL
BILIRUB UR QL STRIP: NEGATIVE
CLARITY UR: CLEAR
COLOR UR: YELLOW
CRYSTALS URNS MICRO: ABNORMAL /HPF
GLUCOSE UR STRIP-MCNC: 250 MG/DL
HGB UR QL STRIP.AUTO: NEGATIVE
KETONES UR STRIP-MCNC: NEGATIVE MG/DL
LEUKOCYTE ESTERASE UR QL STRIP: NEGATIVE
NITRITE UR QL STRIP: NEGATIVE
PH UR STRIP: 5.5 [PH] (ref 5–8)
PROT UR STRIP-MCNC: NEGATIVE MG/DL
RBC #/AREA URNS HPF: ABNORMAL /HPF
SP GR UR STRIP: >1.03 (ref 1–1.03)
UROBILINOGEN UR STRIP-ACNC: 0.2 EU/DL (ref 0–1)
WBC #/AREA URNS HPF: ABNORMAL /HPF

## 2025-07-31 PROCEDURE — 81001 URINALYSIS AUTO W/SCOPE: CPT

## 2025-07-31 PROCEDURE — 87591 N.GONORRHOEAE DNA AMP PROB: CPT

## 2025-07-31 PROCEDURE — 99211 OFF/OP EST MAY X REQ PHY/QHP: CPT

## 2025-07-31 PROCEDURE — 87491 CHLMYD TRACH DNA AMP PROBE: CPT

## 2025-07-31 RX ORDER — DOXYCYCLINE HYCLATE 100 MG
100 TABLET ORAL 2 TIMES DAILY
Qty: 14 TABLET | Refills: 0 | Status: SHIPPED | OUTPATIENT
Start: 2025-07-31 | End: 2025-08-07

## 2025-07-31 RX ORDER — METRONIDAZOLE 500 MG/1
500 TABLET ORAL 2 TIMES DAILY
Qty: 14 TABLET | Refills: 0 | Status: SHIPPED | OUTPATIENT
Start: 2025-07-31 | End: 2025-08-07

## 2025-07-31 ASSESSMENT — PAIN SCALES - GENERAL: PAINLEVEL_OUTOF10: 0

## 2025-07-31 ASSESSMENT — PAIN - FUNCTIONAL ASSESSMENT: PAIN_FUNCTIONAL_ASSESSMENT: 0-10

## 2025-07-31 NOTE — ED PROVIDER NOTES
Independent TABITHA Visit.        [facility] URGENT CARE  ED  Encounter Note  Admit Date/RoomTime: 2025  5:00 PM  ED Room:   NAME: Brayan Olsen  : 1965  MRN: [id number]  PCP: Emiliana Sidhu MD    CHIEF COMPLAINT     Exposure to STD (States he was treated for Tric here but still has it ) and Dental Problem (Cracked tooth )    HISTORY OF PRESENT ILLNESS        Brayan Olsen is a 60 y.o. male who presents to the ED with complaints of right lower jawline dental pain that developed approximately 1 week ago.  Patient states an intermittent chronic issue.  Patient states she needs a tooth removed, but is in the  and does not stay in 1 place long enough to have the tooth taken care of.  Patient also states his girlfriend was recently diagnosed with chlamydia and trichomonas and would like to be treated for both.  Patient denies fever or chills.  Patient denies dysuria.  Patient denies penile discharge.    REVIEW OF SYSTEMS     Pertinent positives and negatives are stated within HPI, all other systems reviewed and are negative.    Past Medical History:  has a past medical history of Diabetes mellitus (HCC), Erectile, Erectile dysfunction, Hyperlipidemia, and Paroxysmal atrial fibrillation (HCC).  Surgical History:  has no past surgical history on file.  Social History:  reports that he has never smoked. He has never used smokeless tobacco. He reports that he does not currently use alcohol. He reports that he does not use drugs.  Family History: family history includes Heart Disease in his mother; Other in his father and mother.   Allergies: Patient has no known allergies.  CURRENT MEDICATIONS       Previous Medications    ASCORBIC ACID (VITAMIN C) 1000 MG TABLET    Take 1 tablet by mouth daily    ASPIRIN 81 MG EC TABLET    Take 1 tablet by mouth daily    ATORVASTATIN (LIPITOR) 40 MG TABLET    Take 1 tablet by mouth at bedtime    BLOOD GLUCOSE TEST STRIPS (ASCENSIA AUTODISC VI;ONE TOUCH ULTRA TEST  times daily for 7 days    METRONIDAZOLE (FLAGYL) 500 MG TABLET    Take 1 tablet by mouth 2 times daily for 7 days     Electronically signed by RUSH Reddy NP   DD: 7/31/25  **This report was transcribed using voice recognition software. Every effort was made to ensure accuracy; however, inadvertent computerized transcription errors may be present.  END OF ED PROVIDER NOTE      Augie Peoples APRN - NP  07/31/25 [id number]

## 2025-08-01 LAB — GLUTAMIC ACID DECARB AB: <5 IU/ML (ref 0–5)

## 2025-08-03 LAB — ZINC TRANSPORTER 8 AB: 20.6 U/ML (ref 0–15)

## 2025-08-04 LAB — IA-2 AUTOANTIBODIES: <5.4 U/ML (ref 0–7.4)

## 2025-08-18 DIAGNOSIS — E11.9 TYPE 2 DIABETES MELLITUS WITHOUT COMPLICATION, WITH LONG-TERM CURRENT USE OF INSULIN (HCC): ICD-10-CM

## 2025-08-18 DIAGNOSIS — Z79.4 TYPE 2 DIABETES MELLITUS WITHOUT COMPLICATION, WITH LONG-TERM CURRENT USE OF INSULIN (HCC): ICD-10-CM

## 2025-08-19 RX ORDER — DULAGLUTIDE 4.5 MG/.5ML
INJECTION, SOLUTION SUBCUTANEOUS
Qty: 2 ML | Refills: 3 | Status: SHIPPED | OUTPATIENT
Start: 2025-08-19